# Patient Record
Sex: FEMALE | Race: WHITE | Employment: OTHER | URBAN - METROPOLITAN AREA
[De-identification: names, ages, dates, MRNs, and addresses within clinical notes are randomized per-mention and may not be internally consistent; named-entity substitution may affect disease eponyms.]

---

## 2018-05-04 ENCOUNTER — HOSPITAL ENCOUNTER (OUTPATIENT)
Age: 43
Discharge: HOME | End: 2018-05-04
Payer: COMMERCIAL

## 2018-05-04 LAB
ANION GAP SERPL CALCULATED.3IONS-SCNC: 10 MMOL/L (ref 7–16)
BASOPHILS ABSOLUTE: 0.02 E9/L (ref 0–0.2)
BASOPHILS RELATIVE PERCENT: 0.3 % (ref 0–2)
BUN BLDV-MCNC: 8 MG/DL (ref 6–20)
CALCIUM SERPL-MCNC: 9.4 MG/DL (ref 8.6–10.2)
CHLORIDE BLD-SCNC: 106 MMOL/L (ref 98–107)
CO2: 27 MMOL/L (ref 22–29)
CREAT SERPL-MCNC: 0.9 MG/DL (ref 0.5–1)
EOSINOPHILS ABSOLUTE: 0.27 E9/L (ref 0.05–0.5)
EOSINOPHILS RELATIVE PERCENT: 3.4 % (ref 0–6)
GFR AFRICAN AMERICAN: >60
GFR NON-AFRICAN AMERICAN: >60 ML/MIN/1.73
HCT VFR BLD CALC: 35.6 % (ref 34–48)
HEMOGLOBIN: 12.6 G/DL (ref 11.5–15.5)
IMMATURE GRANULOCYTES #: 0.02 E9/L
IMMATURE GRANULOCYTES %: 0.3 % (ref 0–5)
LYMPHOCYTES ABSOLUTE: 2.28 E9/L (ref 1.5–4)
LYMPHOCYTES RELATIVE PERCENT: 28.5 % (ref 20–42)
MAGNESIUM: 2 MG/DL (ref 1.6–2.6)
MCH RBC QN AUTO: 32.8 PG (ref 26–35)
MCHC RBC AUTO-ENTMCNC: 35.4 % (ref 32–34.5)
MCV RBC AUTO: 92.7 FL (ref 80–99.9)
MONOCYTES ABSOLUTE: 0.74 E9/L (ref 0.1–0.95)
MONOCYTES RELATIVE PERCENT: 9.3 % (ref 2–12)
NEUTROPHILS ABSOLUTE: 4.67 E9/L (ref 1.8–7.3)
NEUTROPHILS RELATIVE PERCENT: 58.2 % (ref 43–80)
PDW BLD-RTO: 11.8 FL (ref 11.5–15)
PLATELET # BLD: 260 E9/L (ref 130–450)
PMV BLD AUTO: 9.7 FL (ref 7–12)
POTASSIUM SERPL-SCNC: 4.3 MMOL/L (ref 3.5–5)
RBC # BLD: 3.84 E12/L (ref 3.5–5.5)
SODIUM BLD-SCNC: 143 MMOL/L (ref 132–146)
WBC # BLD: 8 E9/L (ref 4.5–11.5)

## 2018-05-04 PROCEDURE — 83735 ASSAY OF MAGNESIUM: CPT

## 2018-05-04 PROCEDURE — 84520 ASSAY OF UREA NITROGEN: CPT

## 2018-05-04 PROCEDURE — 82310 ASSAY OF CALCIUM: CPT

## 2018-05-04 PROCEDURE — 80051 ELECTROLYTE PANEL: CPT

## 2018-05-04 PROCEDURE — 85025 COMPLETE CBC W/AUTO DIFF WBC: CPT

## 2018-05-04 PROCEDURE — 36415 COLL VENOUS BLD VENIPUNCTURE: CPT

## 2018-05-04 PROCEDURE — 82565 ASSAY OF CREATININE: CPT

## 2018-06-22 ENCOUNTER — HOSPITAL ENCOUNTER (OUTPATIENT)
Age: 43
Discharge: HOME | End: 2018-06-24
Payer: COMMERCIAL

## 2018-06-22 ENCOUNTER — HOSPITAL ENCOUNTER (OUTPATIENT)
Dept: GENERAL RADIOLOGY | Age: 43
Discharge: HOME | End: 2018-06-24
Payer: COMMERCIAL

## 2018-06-22 DIAGNOSIS — R52 PAIN: ICD-10-CM

## 2018-06-22 PROCEDURE — 73110 X-RAY EXAM OF WRIST: CPT

## 2018-10-10 ENCOUNTER — HOSPITAL ENCOUNTER (OUTPATIENT)
Dept: GENERAL RADIOLOGY | Age: 43
Discharge: HOME | End: 2018-10-12
Payer: COMMERCIAL

## 2018-10-10 ENCOUNTER — HOSPITAL ENCOUNTER (OUTPATIENT)
Age: 43
Discharge: HOME | End: 2018-10-10
Payer: COMMERCIAL

## 2018-10-10 DIAGNOSIS — M15.9 PRIMARY OSTEOARTHRITIS INVOLVING MULTIPLE JOINTS: ICD-10-CM

## 2018-10-10 PROCEDURE — 73110 X-RAY EXAM OF WRIST: CPT

## 2018-10-10 PROCEDURE — 73030 X-RAY EXAM OF SHOULDER: CPT

## 2018-11-09 ENCOUNTER — HOSPITAL ENCOUNTER (OUTPATIENT)
Age: 43
Discharge: HOME | End: 2018-11-11
Payer: COMMERCIAL

## 2018-11-09 PROCEDURE — 88175 CYTOPATH C/V AUTO FLUID REDO: CPT

## 2019-01-11 ENCOUNTER — HOSPITAL ENCOUNTER (OUTPATIENT)
Dept: MRI IMAGING | Age: 44
Discharge: HOME | End: 2019-01-13
Payer: COMMERCIAL

## 2019-01-11 DIAGNOSIS — M75.101 TEAR OF RIGHT ROTATOR CUFF, UNSPECIFIED TEAR EXTENT: ICD-10-CM

## 2019-01-11 PROCEDURE — 73221 MRI JOINT UPR EXTREM W/O DYE: CPT

## 2019-03-08 ENCOUNTER — HOSPITAL ENCOUNTER (OUTPATIENT)
Age: 44
Discharge: HOME | End: 2019-03-10
Payer: COMMERCIAL

## 2019-03-08 ENCOUNTER — HOSPITAL ENCOUNTER (OUTPATIENT)
Dept: GENERAL RADIOLOGY | Age: 44
Discharge: HOME | End: 2019-03-10
Payer: COMMERCIAL
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
## 2019-03-08 PROCEDURE — 73080 X-RAY EXAM OF ELBOW: CPT

## 2019-10-14 ENCOUNTER — TELEPHONE (OUTPATIENT)
Dept: OTHER | Facility: CLINIC | Age: 44
End: 2019-10-14

## 2019-10-14 ENCOUNTER — HOSPITAL ENCOUNTER (EMERGENCY)
Age: 44
Discharge: HOME | End: 2019-10-14
Payer: COMMERCIAL

## 2019-10-14 ENCOUNTER — APPOINTMENT (OUTPATIENT)
Dept: GENERAL RADIOLOGY | Age: 44
End: 2019-10-14
Payer: COMMERCIAL

## 2019-10-14 VITALS
DIASTOLIC BLOOD PRESSURE: 84 MMHG | HEART RATE: 80 BPM | OXYGEN SATURATION: 100 % | RESPIRATION RATE: 16 BRPM | TEMPERATURE: 98.2 F | SYSTOLIC BLOOD PRESSURE: 122 MMHG
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
## 2019-10-14 DIAGNOSIS — S61.259A DOG BITE OF FINGER, INITIAL ENCOUNTER: Primary | ICD-10-CM

## 2019-10-14 DIAGNOSIS — Z23 NEED FOR TDAP VACCINATION: ICD-10-CM

## 2019-10-14 PROCEDURE — 6360000002 HC RX W HCPCS: Performed by: NURSE PRACTITIONER

## 2019-10-14 PROCEDURE — 90715 TDAP VACCINE 7 YRS/> IM: CPT | Performed by: NURSE PRACTITIONER

## 2019-10-14 PROCEDURE — 73130 X-RAY EXAM OF HAND: CPT

## 2019-10-14 PROCEDURE — 6370000000 HC RX 637 (ALT 250 FOR IP): Performed by: NURSE PRACTITIONER

## 2019-10-14 PROCEDURE — 99283 EMERGENCY DEPT VISIT LOW MDM: CPT

## 2019-10-14 PROCEDURE — 90471 IMMUNIZATION ADMIN: CPT | Performed by: NURSE PRACTITIONER

## 2019-10-14 RX ORDER — ONDANSETRON 4 MG/1
4 TABLET, ORALLY DISINTEGRATING ORAL ONCE
Status: COMPLETED | OUTPATIENT
Start: 2019-10-14 | End: 2019-10-14

## 2019-10-14 RX ORDER — ONDANSETRON 4 MG/1
4 TABLET, ORALLY DISINTEGRATING ORAL 3 TIMES DAILY PRN
Qty: 21 TABLET | Refills: 0 | Status: SHIPPED | OUTPATIENT
Start: 2019-10-14 | End: 2020-12-09

## 2019-10-14 RX ORDER — AMOXICILLIN AND CLAVULANATE POTASSIUM 875; 125 MG/1; MG/1
1 TABLET, FILM COATED ORAL 2 TIMES DAILY WITH MEALS
Qty: 20 TABLET | Refills: 0 | Status: SHIPPED | OUTPATIENT
Start: 2019-10-14 | End: 2019-10-24

## 2019-10-14 RX ORDER — FLUCONAZOLE 150 MG/1
150 TABLET ORAL ONCE
Qty: 1 TABLET | Refills: 0 | Status: SHIPPED | OUTPATIENT
Start: 2019-10-14 | End: 2019-10-14

## 2019-10-14 RX ORDER — AMOXICILLIN AND CLAVULANATE POTASSIUM 875; 125 MG/1; MG/1
1 TABLET, FILM COATED ORAL ONCE
Status: COMPLETED | OUTPATIENT
Start: 2019-10-14 | End: 2019-10-14

## 2019-10-14 RX ADMIN — ONDANSETRON 4 MG: 4 TABLET, ORALLY DISINTEGRATING ORAL at 17:28

## 2019-10-14 RX ADMIN — TETANUS TOXOID, REDUCED DIPHTHERIA TOXOID AND ACELLULAR PERTUSSIS VACCINE, ADSORBED 0.5 ML: 5; 2.5; 8; 8; 2.5 SUSPENSION INTRAMUSCULAR at 17:28

## 2019-10-14 RX ADMIN — AMOXICILLIN AND CLAVULANATE POTASSIUM 1 TABLET: 875; 125 TABLET, FILM COATED ORAL at 17:28

## 2019-10-14 ASSESSMENT — PAIN DESCRIPTION - PAIN TYPE: TYPE: ACUTE PAIN

## 2019-10-14 ASSESSMENT — PAIN DESCRIPTION - DESCRIPTORS: DESCRIPTORS: THROBBING

## 2019-10-14 ASSESSMENT — PAIN DESCRIPTION - ORIENTATION: ORIENTATION: LEFT

## 2019-10-14 ASSESSMENT — PAIN SCALES - GENERAL: PAINLEVEL_OUTOF10: 3

## 2019-10-29 ENCOUNTER — HOSPITAL ENCOUNTER (OUTPATIENT)
Dept: WOUND CARE | Age: 44
Discharge: HOME | End: 2019-10-29
Payer: COMMERCIAL

## 2019-11-18 ENCOUNTER — HOSPITAL ENCOUNTER (OUTPATIENT)
Age: 44
Discharge: HOME | End: 2019-11-20
Payer: COMMERCIAL

## 2019-11-18 DIAGNOSIS — N92.6 IRREGULAR PERIODS/MENSTRUAL CYCLES: ICD-10-CM

## 2019-11-18 LAB
ESTRADIOL LEVEL: 231.1 PG/ML
FOLLICLE STIMULATING HORMONE: 3.7 MIU/ML
GONADOTROPIN, CHORIONIC (HCG) QUANT: <0.1 MIU/ML
HCT VFR BLD CALC: 40.9 % (ref 34–48)
HEMOGLOBIN: 13.6 G/DL (ref 11.5–15.5)
LUTEINIZING HORMONE: 6.4 MIU/ML
MCH RBC QN AUTO: 32 PG (ref 26–35)
MCHC RBC AUTO-ENTMCNC: 33.3 % (ref 32–34.5)
MCV RBC AUTO: 96.2 FL (ref 80–99.9)
PDW BLD-RTO: 12.3 FL (ref 11.5–15)
PLATELET # BLD: 319 E9/L (ref 130–450)
PMV BLD AUTO: 10.3 FL (ref 7–12)
PROLACTIN: 7.41 NG/ML
RBC # BLD: 4.25 E12/L (ref 3.5–5.5)
TSH SERPL DL<=0.05 MIU/L-ACNC: 0.86 UIU/ML (ref 0.27–4.2)
WBC # BLD: 6.6 E9/L (ref 4.5–11.5)

## 2019-11-18 PROCEDURE — 84443 ASSAY THYROID STIM HORMONE: CPT

## 2019-11-18 PROCEDURE — 84702 CHORIONIC GONADOTROPIN TEST: CPT

## 2019-11-18 PROCEDURE — 83002 ASSAY OF GONADOTROPIN (LH): CPT

## 2019-11-18 PROCEDURE — 84146 ASSAY OF PROLACTIN: CPT

## 2019-11-18 PROCEDURE — 82626 DEHYDROEPIANDROSTERONE: CPT

## 2019-11-18 PROCEDURE — 84144 ASSAY OF PROGESTERONE: CPT

## 2019-11-18 PROCEDURE — 85027 COMPLETE CBC AUTOMATED: CPT

## 2019-11-18 PROCEDURE — 82670 ASSAY OF TOTAL ESTRADIOL: CPT

## 2019-11-18 PROCEDURE — 87624 HPV HI-RISK TYP POOLED RSLT: CPT

## 2019-11-18 PROCEDURE — 88175 CYTOPATH C/V AUTO FLUID REDO: CPT

## 2019-11-18 PROCEDURE — 83001 ASSAY OF GONADOTROPIN (FSH): CPT

## 2019-11-20 LAB — PROGESTERONE LEVEL: <0.05 NG/ML

## 2019-11-22 LAB
DHEA: 2.34 NG/ML (ref 0.63–4.7)
HPV SAMPLE: NORMAL
HPV TYPE 16: NOT DETECTED
HPV TYPE 18: NOT DETECTED
HPV, HIGH RISK OTHER: NOT DETECTED
INTERPRETATION: NORMAL
SOURCE: NORMAL

## 2020-06-03 ENCOUNTER — HOSPITAL ENCOUNTER (OUTPATIENT)
Age: 45
Discharge: HOME | End: 2020-06-05
Payer: COMMERCIAL

## 2020-06-03 ENCOUNTER — HOSPITAL ENCOUNTER (OUTPATIENT)
Dept: GENERAL RADIOLOGY | Age: 45
Discharge: HOME | End: 2020-06-05
Payer: COMMERCIAL

## 2020-06-03 PROCEDURE — 73030 X-RAY EXAM OF SHOULDER: CPT

## 2020-07-14 ENCOUNTER — HOSPITAL ENCOUNTER (OUTPATIENT)
Age: 45
Discharge: HOME | End: 2020-07-14
Payer: COMMERCIAL

## 2020-07-14 LAB
ANION GAP SERPL CALCULATED.3IONS-SCNC: 11 MMOL/L (ref 7–16)
BASOPHILS ABSOLUTE: 0.03 E9/L (ref 0–0.2)
BASOPHILS RELATIVE PERCENT: 0.4 % (ref 0–2)
BUN BLDV-MCNC: 10 MG/DL (ref 6–20)
CHLORIDE BLD-SCNC: 102 MMOL/L (ref 98–107)
CO2: 26 MMOL/L (ref 22–29)
CREAT SERPL-MCNC: 0.9 MG/DL (ref 0.5–1)
EOSINOPHILS ABSOLUTE: 0.16 E9/L (ref 0.05–0.5)
EOSINOPHILS RELATIVE PERCENT: 2.4 % (ref 0–6)
GFR AFRICAN AMERICAN: >60
GFR NON-AFRICAN AMERICAN: >60 ML/MIN/1.73
HCT VFR BLD CALC: 36.8 % (ref 34–48)
HEMOGLOBIN: 13.5 G/DL (ref 11.5–15.5)
IMMATURE GRANULOCYTES #: 0.02 E9/L
IMMATURE GRANULOCYTES %: 0.3 % (ref 0–5)
LYMPHOCYTES ABSOLUTE: 1.74 E9/L (ref 1.5–4)
LYMPHOCYTES RELATIVE PERCENT: 26.1 % (ref 20–42)
MCH RBC QN AUTO: 34.3 PG (ref 26–35)
MCHC RBC AUTO-ENTMCNC: 36.7 % (ref 32–34.5)
MCV RBC AUTO: 93.4 FL (ref 80–99.9)
MONOCYTES ABSOLUTE: 0.55 E9/L (ref 0.1–0.95)
MONOCYTES RELATIVE PERCENT: 8.2 % (ref 2–12)
NEUTROPHILS ABSOLUTE: 4.17 E9/L (ref 1.8–7.3)
NEUTROPHILS RELATIVE PERCENT: 62.6 % (ref 43–80)
PDW BLD-RTO: 11.4 FL (ref 11.5–15)
PLATELET # BLD: 280 E9/L (ref 130–450)
PMV BLD AUTO: 9.6 FL (ref 7–12)
POTASSIUM SERPL-SCNC: 4.5 MMOL/L (ref 3.5–5)
RBC # BLD: 3.94 E12/L (ref 3.5–5.5)
SODIUM BLD-SCNC: 139 MMOL/L (ref 132–146)
T3 FREE: 2.4 PG/ML (ref 2–4.4)
T4 FREE: 1.13 NG/DL (ref 0.93–1.7)
TSH SERPL DL<=0.05 MIU/L-ACNC: 1.57 UIU/ML (ref 0.27–4.2)
WBC # BLD: 6.7 E9/L (ref 4.5–11.5)

## 2020-07-14 PROCEDURE — 84443 ASSAY THYROID STIM HORMONE: CPT

## 2020-07-14 PROCEDURE — 85025 COMPLETE CBC W/AUTO DIFF WBC: CPT

## 2020-07-14 PROCEDURE — 36415 COLL VENOUS BLD VENIPUNCTURE: CPT

## 2020-07-14 PROCEDURE — 86376 MICROSOMAL ANTIBODY EACH: CPT

## 2020-07-14 PROCEDURE — 80051 ELECTROLYTE PANEL: CPT

## 2020-07-14 PROCEDURE — 84439 ASSAY OF FREE THYROXINE: CPT

## 2020-07-14 PROCEDURE — 84520 ASSAY OF UREA NITROGEN: CPT

## 2020-07-14 PROCEDURE — 84481 FREE ASSAY (FT-3): CPT

## 2020-07-14 PROCEDURE — 82565 ASSAY OF CREATININE: CPT

## 2020-07-17 LAB — THYROID PEROXIDASE (TPO) ABS: 0.5 IU/ML (ref 0–9)

## 2020-09-16 ENCOUNTER — HOSPITAL ENCOUNTER (OUTPATIENT)
Age: 45
Discharge: HOME | End: 2020-09-18
Payer: COMMERCIAL

## 2020-09-16 ENCOUNTER — HOSPITAL ENCOUNTER (OUTPATIENT)
Dept: GENERAL RADIOLOGY | Age: 45
Discharge: HOME | End: 2020-09-18
Payer: COMMERCIAL

## 2020-09-16 PROCEDURE — 73110 X-RAY EXAM OF WRIST: CPT

## 2020-12-09 ENCOUNTER — HOSPITAL ENCOUNTER (OUTPATIENT)
Age: 45
Discharge: HOME | End: 2020-12-11
Payer: COMMERCIAL

## 2020-12-09 PROCEDURE — U0003 INFECTIOUS AGENT DETECTION BY NUCLEIC ACID (DNA OR RNA); SEVERE ACUTE RESPIRATORY SYNDROME CORONAVIRUS 2 (SARS-COV-2) (CORONAVIRUS DISEASE [COVID-19]), AMPLIFIED PROBE TECHNIQUE, MAKING USE OF HIGH THROUGHPUT TECHNOLOGIES AS DESCRIBED BY CMS-2020-01-R: HCPCS

## 2020-12-09 NOTE — PROGRESS NOTES
[facility] PRE-ADMISSION TESTING INSTRUCTIONS    The Preadmission Testing patient is instructed accordingly using the following criteria (check applicable):    ARRIVAL INSTRUCTIONS:  [x] Parking the day of Surgery is located in the Main Entrance lot. Upon entering the door, make an immediate right to the surgery reception desk    [x] Bring photo ID and insurance card    [] Bring in a copy of Living will or Durable Power of  papers. [x] Please be sure to arrange transportation to and from the hospital    [x] Please arrange for someone to be with you the remainder of the day due to having anesthesia      GENERAL INSTRUCTIONS:    [x] Nothing by mouth after midnight, including gum, candy, mints or water    [x] You may brush your teeth, but do not swallow any water    [x] Take medications as instructed with 1-2 oz of water    [x] Stop herbal supplements and vitamins 5 days prior to procedure    [] Follow preop dosing of blood thinners per physician instructions    [] Do not take insulin or oral diabetic medications    [] If diabetic and have low blood sugar or feel symptomatic, take 1-2oz apple juice or glucose tablets    [] Bring inhalers day of surgery    [] Bring C-PAP/ Bi-Pap day of surgery    [] Bring urine specimen day of surgery    [x] Antibacterial Soap shower or bath AM of Surgery, no lotion, powders or creams to surgical site    [] Follow bowel prep as instructed per surgeon    [] No tobacco products within 24 hours of surgery     [] No alcohol or illegal drug use within 24 hours of surgery.     [x] Jewelry, body piercing's, eyeglasses, contact lenses and dentures are not permitted into surgery (bring cases)      [x] Please do not wear any nail polish or make up on the day of surgery    [x] If not already done, you can expect a call from registration    [x] If surgeon requests a time change you will be notified the day prior to surgery    [x] If you receive a survey after surgery we would greatly appreciate your comments    [] Parent/guardian of a minor must accompany their child and remain on the premises  the entire time they are under our care     [] Pediatric patients may bring favorite toy, blanket or comfort item with them    [] A caregiver or family member must remain with the patient during their stay if they are mentally handicapped, have dementia, disoriented or unable to use a call light or would be a safety concern if left unattended    [x] Please notify surgeon if you develop any illness between now and time of surgery (cold, cough, sore throat, fever, nausea, vomiting) or any signs of infections  including skin, wounds, and dental.    [] Other instructions    EDUCATIONAL MATERIALS PROVIDED:    [] PAT Preoperative Education Packet/Booklet     [] Medication List    [] Fluoroscopy Information Pamphlet    [] Transfusion bracelet applied with instructions    [] Joint replacement video reviewed    [] Shower with antibacterial soap and use CHG wipes provided the evening before surgery as instructed        Have you been tested for COVID  Yes           Have you been told you were positive for COVID No  Have you had any known exposure to someone that is positive for COVID No  Do you have a cough                   No              Do you have shortness of breath No                 Do you have a sore throat            No                Are you having chills                    No                Are you having muscle aches. No                    Please come to the hospital wearing a mask and have your significant other wear a mask as well. Both of you should check your temperature before leaving to come here,  if it is 100 or higher please call [phone] for instruction.

## 2020-12-10 LAB
SARS-COV-2: NOT DETECTED
SOURCE: 2

## 2020-12-11 ENCOUNTER — ANESTHESIA EVENT (OUTPATIENT)
Dept: OPERATING ROOM | Age: 45
End: 2020-12-11
Payer: COMMERCIAL

## 2020-12-14 ENCOUNTER — HOSPITAL ENCOUNTER (OUTPATIENT)
Age: 45
Setting detail: OUTPATIENT SURGERY
Discharge: HOME | End: 2020-12-14
Attending: ORTHOPAEDIC SURGERY | Admitting: ORTHOPAEDIC SURGERY
Payer: COMMERCIAL

## 2020-12-14 ENCOUNTER — ANESTHESIA (OUTPATIENT)
Dept: OPERATING ROOM | Age: 45
End: 2020-12-14
Payer: COMMERCIAL

## 2020-12-14 VITALS
TEMPERATURE: 97.4 F | HEART RATE: 86 BPM | HEIGHT: 65 IN | SYSTOLIC BLOOD PRESSURE: 114 MMHG | DIASTOLIC BLOOD PRESSURE: 60 MMHG | WEIGHT: 185 LBS | BODY MASS INDEX: 30.82 KG/M2 | RESPIRATION RATE: 19 BRPM | OXYGEN SATURATION: 96 %

## 2020-12-14 VITALS — DIASTOLIC BLOOD PRESSURE: 54 MMHG | SYSTOLIC BLOOD PRESSURE: 101 MMHG | OXYGEN SATURATION: 100 %

## 2020-12-14 PROBLEM — G89.18 POSTOPERATIVE PAIN: Status: ACTIVE | Noted: 2020-12-14

## 2020-12-14 LAB — HCG(URINE) PREGNANCY TEST: NEGATIVE

## 2020-12-14 PROCEDURE — 7100000010 HC PHASE II RECOVERY - FIRST 15 MIN: Performed by: ORTHOPAEDIC SURGERY

## 2020-12-14 PROCEDURE — 6360000002 HC RX W HCPCS

## 2020-12-14 PROCEDURE — 88304 TISSUE EXAM BY PATHOLOGIST: CPT

## 2020-12-14 PROCEDURE — 6360000002 HC RX W HCPCS: Performed by: ANESTHESIOLOGY

## 2020-12-14 PROCEDURE — 3600000012 HC SURGERY LEVEL 2 ADDTL 15MIN: Performed by: ORTHOPAEDIC SURGERY

## 2020-12-14 PROCEDURE — 3700000000 HC ANESTHESIA ATTENDED CARE: Performed by: ORTHOPAEDIC SURGERY

## 2020-12-14 PROCEDURE — 3600000002 HC SURGERY LEVEL 2 BASE: Performed by: ORTHOPAEDIC SURGERY

## 2020-12-14 PROCEDURE — 2500000003 HC RX 250 WO HCPCS

## 2020-12-14 PROCEDURE — 81025 URINE PREGNANCY TEST: CPT

## 2020-12-14 PROCEDURE — 2580000003 HC RX 258

## 2020-12-14 PROCEDURE — 3700000001 HC ADD 15 MINUTES (ANESTHESIA): Performed by: ORTHOPAEDIC SURGERY

## 2020-12-14 PROCEDURE — 6360000002 HC RX W HCPCS: Performed by: ORTHOPAEDIC SURGERY

## 2020-12-14 PROCEDURE — 2709999900 HC NON-CHARGEABLE SUPPLY: Performed by: ORTHOPAEDIC SURGERY

## 2020-12-14 PROCEDURE — 7100000000 HC PACU RECOVERY - FIRST 15 MIN: Performed by: ORTHOPAEDIC SURGERY

## 2020-12-14 PROCEDURE — 2580000003 HC RX 258: Performed by: ORTHOPAEDIC SURGERY

## 2020-12-14 PROCEDURE — 7100000001 HC PACU RECOVERY - ADDTL 15 MIN: Performed by: ORTHOPAEDIC SURGERY

## 2020-12-14 PROCEDURE — 7100000011 HC PHASE II RECOVERY - ADDTL 15 MIN: Performed by: ORTHOPAEDIC SURGERY

## 2020-12-14 RX ORDER — DIPHENHYDRAMINE HYDROCHLORIDE 50 MG/ML
12.5 INJECTION INTRAMUSCULAR; INTRAVENOUS
Status: DISCONTINUED | OUTPATIENT
Start: 2020-12-14 | End: 2020-12-14 | Stop reason: HOSPADM

## 2020-12-14 RX ORDER — SODIUM CHLORIDE, SODIUM LACTATE, POTASSIUM CHLORIDE, CALCIUM CHLORIDE 600; 310; 30; 20 MG/100ML; MG/100ML; MG/100ML; MG/100ML
INJECTION, SOLUTION INTRAVENOUS CONTINUOUS PRN
Status: DISCONTINUED | OUTPATIENT
Start: 2020-12-14 | End: 2020-12-14 | Stop reason: SDUPTHER

## 2020-12-14 RX ORDER — ONDANSETRON 2 MG/ML
INJECTION INTRAMUSCULAR; INTRAVENOUS PRN
Status: DISCONTINUED | OUTPATIENT
Start: 2020-12-14 | End: 2020-12-14 | Stop reason: SDUPTHER

## 2020-12-14 RX ORDER — FENTANYL CITRATE 50 UG/ML
25 INJECTION, SOLUTION INTRAMUSCULAR; INTRAVENOUS EVERY 5 MIN PRN
Status: DISCONTINUED | OUTPATIENT
Start: 2020-12-14 | End: 2020-12-14 | Stop reason: HOSPADM

## 2020-12-14 RX ORDER — GLYCOPYRROLATE 1 MG/5 ML
SYRINGE (ML) INTRAVENOUS PRN
Status: DISCONTINUED | OUTPATIENT
Start: 2020-12-14 | End: 2020-12-14 | Stop reason: SDUPTHER

## 2020-12-14 RX ORDER — KETOROLAC TROMETHAMINE 30 MG/ML
INJECTION, SOLUTION INTRAMUSCULAR; INTRAVENOUS PRN
Status: DISCONTINUED | OUTPATIENT
Start: 2020-12-14 | End: 2020-12-14 | Stop reason: SDUPTHER

## 2020-12-14 RX ORDER — LIDOCAINE HYDROCHLORIDE 20 MG/ML
INJECTION, SOLUTION EPIDURAL; INFILTRATION; INTRACAUDAL; PERINEURAL PRN
Status: DISCONTINUED | OUTPATIENT
Start: 2020-12-14 | End: 2020-12-14 | Stop reason: SDUPTHER

## 2020-12-14 RX ORDER — CEFAZOLIN SODIUM 1 G/3ML
INJECTION, POWDER, FOR SOLUTION INTRAMUSCULAR; INTRAVENOUS
Status: DISCONTINUED
Start: 2020-12-14 | End: 2020-12-14 | Stop reason: HOSPADM

## 2020-12-14 RX ORDER — PROPOFOL 10 MG/ML
INJECTION, EMULSION INTRAVENOUS CONTINUOUS PRN
Status: DISCONTINUED | OUTPATIENT
Start: 2020-12-14 | End: 2020-12-14 | Stop reason: SDUPTHER

## 2020-12-14 RX ORDER — HYDROCODONE BITARTRATE AND ACETAMINOPHEN 5; 325 MG/1; MG/1
1 TABLET ORAL EVERY 6 HOURS PRN
Qty: 20 TABLET | Refills: 0 | Status: SHIPPED | OUTPATIENT
Start: 2020-12-14 | End: 2020-12-19

## 2020-12-14 RX ORDER — FENTANYL CITRATE 50 UG/ML
INJECTION, SOLUTION INTRAMUSCULAR; INTRAVENOUS PRN
Status: DISCONTINUED | OUTPATIENT
Start: 2020-12-14 | End: 2020-12-14 | Stop reason: SDUPTHER

## 2020-12-14 RX ORDER — KETAMINE HYDROCHLORIDE 10 MG/ML
INJECTION, SOLUTION INTRAMUSCULAR; INTRAVENOUS PRN
Status: DISCONTINUED | OUTPATIENT
Start: 2020-12-14 | End: 2020-12-14 | Stop reason: SDUPTHER

## 2020-12-14 RX ORDER — MEPERIDINE HYDROCHLORIDE 25 MG/ML
12.5 INJECTION INTRAMUSCULAR; INTRAVENOUS; SUBCUTANEOUS EVERY 10 MIN PRN
Status: DISCONTINUED | OUTPATIENT
Start: 2020-12-14 | End: 2020-12-14 | Stop reason: HOSPADM

## 2020-12-14 RX ORDER — DIPHENHYDRAMINE HYDROCHLORIDE 50 MG/ML
INJECTION INTRAMUSCULAR; INTRAVENOUS PRN
Status: DISCONTINUED | OUTPATIENT
Start: 2020-12-14 | End: 2020-12-14 | Stop reason: SDUPTHER

## 2020-12-14 RX ORDER — MIDAZOLAM HYDROCHLORIDE 1 MG/ML
INJECTION INTRAMUSCULAR; INTRAVENOUS PRN
Status: DISCONTINUED | OUTPATIENT
Start: 2020-12-14 | End: 2020-12-14 | Stop reason: SDUPTHER

## 2020-12-14 RX ORDER — OXYCODONE HYDROCHLORIDE AND ACETAMINOPHEN 5; 325 MG/1; MG/1
1 TABLET ORAL PRN
Status: DISCONTINUED | OUTPATIENT
Start: 2020-12-14 | End: 2020-12-14 | Stop reason: HOSPADM

## 2020-12-14 RX ORDER — PROPOFOL 10 MG/ML
INJECTION, EMULSION INTRAVENOUS PRN
Status: DISCONTINUED | OUTPATIENT
Start: 2020-12-14 | End: 2020-12-14 | Stop reason: SDUPTHER

## 2020-12-14 RX ADMIN — DIPHENHYDRAMINE HYDROCHLORIDE 12.5 MG: 50 INJECTION, SOLUTION INTRAMUSCULAR; INTRAVENOUS at 13:59

## 2020-12-14 RX ADMIN — KETOROLAC TROMETHAMINE 30 MG: 30 INJECTION, SOLUTION INTRAMUSCULAR; INTRAVENOUS at 14:30

## 2020-12-14 RX ADMIN — KETAMINE HYDROCHLORIDE 30 MG: 10 INJECTION, SOLUTION INTRAMUSCULAR; INTRAVENOUS at 14:03

## 2020-12-14 RX ADMIN — PROPOFOL 200 MG: 10 INJECTION, EMULSION INTRAVENOUS at 13:46

## 2020-12-14 RX ADMIN — SODIUM CHLORIDE, POTASSIUM CHLORIDE, SODIUM LACTATE AND CALCIUM CHLORIDE: 600; 310; 30; 20 INJECTION, SOLUTION INTRAVENOUS at 14:25

## 2020-12-14 RX ADMIN — ONDANSETRON 4 MG: 2 INJECTION INTRAMUSCULAR; INTRAVENOUS at 14:30

## 2020-12-14 RX ADMIN — PROPOFOL 100 MG: 10 INJECTION, EMULSION INTRAVENOUS at 13:48

## 2020-12-14 RX ADMIN — Medication 0.2 MG: at 13:44

## 2020-12-14 RX ADMIN — LIDOCAINE HYDROCHLORIDE 40 MG: 20 INJECTION, SOLUTION EPIDURAL; INFILTRATION; INTRACAUDAL; PERINEURAL at 13:46

## 2020-12-14 RX ADMIN — MEPERIDINE HYDROCHLORIDE 12.5 MG: 25 INJECTION, SOLUTION INTRAMUSCULAR; INTRAVENOUS; SUBCUTANEOUS at 16:05

## 2020-12-14 RX ADMIN — PROPOFOL 140 MCG/KG/MIN: 10 INJECTION, EMULSION INTRAVENOUS at 13:55

## 2020-12-14 RX ADMIN — CEFAZOLIN 1 G: 1 INJECTION, POWDER, FOR SOLUTION INTRAMUSCULAR; INTRAVENOUS at 13:44

## 2020-12-14 RX ADMIN — FENTANYL CITRATE 100 MCG: 50 INJECTION, SOLUTION INTRAMUSCULAR; INTRAVENOUS at 13:46

## 2020-12-14 RX ADMIN — PROPOFOL 100 MG: 10 INJECTION, EMULSION INTRAVENOUS at 13:51

## 2020-12-14 RX ADMIN — SODIUM CHLORIDE, POTASSIUM CHLORIDE, SODIUM LACTATE AND CALCIUM CHLORIDE: 600; 310; 30; 20 INJECTION, SOLUTION INTRAVENOUS at 12:05

## 2020-12-14 RX ADMIN — MIDAZOLAM 2 MG: 1 INJECTION INTRAMUSCULAR; INTRAVENOUS at 13:40

## 2020-12-14 ASSESSMENT — PULMONARY FUNCTION TESTS
PIF_VALUE: 13
PIF_VALUE: 3
PIF_VALUE: 2
PIF_VALUE: 13
PIF_VALUE: 13
PIF_VALUE: 16
PIF_VALUE: 13
PIF_VALUE: 2
PIF_VALUE: 3
PIF_VALUE: 3
PIF_VALUE: 2
PIF_VALUE: 3
PIF_VALUE: 0
PIF_VALUE: 13
PIF_VALUE: 22
PIF_VALUE: 13
PIF_VALUE: 3
PIF_VALUE: 11
PIF_VALUE: 10
PIF_VALUE: 17
PIF_VALUE: 3
PIF_VALUE: 13
PIF_VALUE: 3
PIF_VALUE: 13
PIF_VALUE: 6
PIF_VALUE: 3
PIF_VALUE: 1
PIF_VALUE: 11
PIF_VALUE: 0
PIF_VALUE: 0
PIF_VALUE: 13
PIF_VALUE: 13
PIF_VALUE: 10
PIF_VALUE: 0
PIF_VALUE: 13
PIF_VALUE: 3
PIF_VALUE: 14
PIF_VALUE: 13
PIF_VALUE: 13
PIF_VALUE: 5
PIF_VALUE: 21
PIF_VALUE: 15
PIF_VALUE: 13
PIF_VALUE: 3
PIF_VALUE: 13
PIF_VALUE: 3
PIF_VALUE: 13
PIF_VALUE: 0
PIF_VALUE: 2
PIF_VALUE: 13
PIF_VALUE: 11
PIF_VALUE: 14
PIF_VALUE: 0
PIF_VALUE: 13
PIF_VALUE: 13
PIF_VALUE: 23
PIF_VALUE: 13
PIF_VALUE: 13
PIF_VALUE: 10
PIF_VALUE: 13

## 2020-12-14 ASSESSMENT — LIFESTYLE VARIABLES: SMOKING_STATUS: 1

## 2020-12-14 ASSESSMENT — PAIN SCALES - GENERAL
PAINLEVEL_OUTOF10: 4
PAINLEVEL_OUTOF10: 0
PAINLEVEL_OUTOF10: 5
PAINLEVEL_OUTOF10: 8
PAINLEVEL_OUTOF10: 0
PAINLEVEL_OUTOF10: 4
PAINLEVEL_OUTOF10: 0

## 2020-12-14 ASSESSMENT — PAIN DESCRIPTION - PAIN TYPE: TYPE: SURGICAL PAIN

## 2020-12-14 ASSESSMENT — PAIN - FUNCTIONAL ASSESSMENT: PAIN_FUNCTIONAL_ASSESSMENT: 0-10

## 2020-12-14 NOTE — BRIEF OP NOTE
Brief Postoperative Note      Patient: Fely Schreiber  YOB: 1975  MRN: [id number]    Date of Procedure: 12/14/2020    Pre-Op Diagnosis: Right dorsal wrist ganglion    Post-Op Diagnosis: Same       Procedure(s):  RIGHT DORSAL WRIST GANGLION EXCISION    Surgeon(s):  Hans Fink MD    Assistant:  * No surgical staff found *    Anesthesia: General    Estimated Blood Loss (mL): Minimal    Complications: None    Specimens:   ID Type Source Tests Collected by Time Destination   A : RIGHT GANGLION CYST Tissue Tissue SURGICAL PATHOLOGY Hans Fink MD 12/14/2020 1411        Implants:  * No implants in log *      Drains: * No LDAs found *    Findings: sessile right dorsal wrist ganglion    Electronically signed by Hans Fink MD on 12/14/2020 at 2:53 PM

## 2020-12-14 NOTE — PROGRESS NOTES
Patient has requested we do not give her pain medication for fear of PONV. I have discussed the benefit of allowing me to give demerol for shivering and she has agreed to that (see MAR). Patient tolerated well and VSS. She states \"I feel much better\". Nutrition offered, IV removed and discharge instructions provided to her and her mother. They have no further questions or concerns at this time.

## 2020-12-14 NOTE — ANESTHESIA PRE PROCEDURE
Department of Anesthesiology  Preprocedure Note       Name:  Lennox Lyons   Age:  39 y.o.  :  1975                                          MRN:  [id number]         Date:  2020      Surgeon: Izabela Guerrero):  Lolita Gomez MD    Procedure: Procedure(s):  RIGHT DORSAL WRIST GANGLION EXCISION    Medications prior to admission:   Prior to Admission medications    Medication Sig Start Date End Date Taking? Authorizing Provider   buPROPion [facility] SR) 150 MG extended release tablet Take 150 mg by mouth daily  10/12/17  Yes Historical Provider, MD   escitalopram (LEXAPRO) 20 MG tablet Take 20 mg by mouth daily. Yes Historical Provider, MD   vitamin D 25 MCG (1000 UT) CAPS Take 1,000 Units by mouth daily    Historical Provider, MD   ascorbic acid (VITAMIN C) 1000 MG tablet Take 1,000 mg by mouth daily    Historical Provider, MD   Multiple Vitamin (MULTI-VITAMIN DAILY PO) Take by mouth daily    Historical Provider, MD       Current medications:    No current facility-administered medications for this encounter. Allergies: Allergies   Allergen Reactions    Compazine [Prochlorperazine Maleate] Other (See Comments)     Patient states \"I go catatonic\"    Codeine Nausea And Vomiting       Problem List:  There is no problem list on file for this patient. Past Medical History:        Diagnosis Date    Depression     Endometriosis     PONV (postoperative nausea and vomiting)        Past Surgical History:        Procedure Laterality Date    APPENDECTOMY      CHOLECYSTECTOMY      LAPAROSCOPY  ,    pain in abdomen    TONSILLECTOMY         Social History:    Social History     Tobacco Use    Smoking status: Never Smoker    Smokeless tobacco: Never Used   Substance Use Topics    Alcohol use:  No                                Counseling given: Not Answered      Vital Signs (Current):   Vitals:    20 1522   Weight: 185 lb (83.9 kg)   Height: 5' 5\" (1.651 m) BP Readings from Last 3 Encounters:   11/19/20 100/66   12/16/19 107/71   11/18/19 108/71       NPO Status:                                                                                 BMI:   Wt Readings from Last 3 Encounters:   12/09/20 185 lb (83.9 kg)   11/19/20 188 lb (85.3 kg)   12/16/19 188 lb (85.3 kg)     Body mass index is 30.79 kg/m². CBC:   Lab Results   Component Value Date    WBC 6.7 07/14/2020    RBC 3.94 07/14/2020    HGB 13.5 07/14/2020    HCT 36.8 07/14/2020    MCV 93.4 07/14/2020    RDW 11.4 07/14/2020     07/14/2020       CMP:   Lab Results   Component Value Date     07/14/2020    K 4.5 07/14/2020     07/14/2020    CO2 26 07/14/2020    BUN 10 07/14/2020    CREATININE 0.9 07/14/2020    GFRAA >60 07/14/2020    LABGLOM >60 07/14/2020    CALCIUM 9.4 05/04/2018       POC Tests: No results for input(s): POCGLU, POCNA, POCK, POCCL, POCBUN, POCHEMO, POCHCT in the last 72 hours. Coags: No results found for: PROTIME, INR, APTT    HCG (If Applicable): No results found for: PREGTESTUR, PREGSERUM, HCG, HCGQUANT     ABGs: No results found for: PHART, PO2ART, QXV5NND, HHU9UND, BEART, H0NEWOHT     Type & Screen (If Applicable):  No results found for: LABABO, LABRH    Drug/Infectious Status (If Applicable):  No results found for: HIV, HEPCAB    COVID-19 Screening (If Applicable):   Lab Results   Component Value Date    COVID19 Not Detected 12/09/2020         Anesthesia Evaluation  Patient summary reviewed and Nursing notes reviewed   history of anesthetic complications: PONV.   Airway: Mallampati: II  TM distance: >3 FB   Neck ROM: full  Mouth opening: > = 3 FB Dental: normal exam         Pulmonary: breath sounds clear to auscultation  (+) current smoker                           Cardiovascular:Negative CV ROS  Exercise tolerance: good (>4 METS),           Rhythm: regular  Rate: normal           Beta Blocker:  Not on Beta Blocker Neuro/Psych:   (+) neuromuscular disease:, depression/anxiety              ROS comment: HNP  R sciatica GI/Hepatic/Renal: Neg GI/Hepatic/Renal ROS            Endo/Other:    (+) : arthritis:., .                 Abdominal:           Vascular:                                        Anesthesia Plan      general     ASA 2       Induction: intravenous. Anesthetic plan and risks discussed with patient and mother.                       Zeferino Jacinto MD   12/14/2020

## 2020-12-14 NOTE — PROGRESS NOTES
Dr. Veto Boucher called via perfect serve for orders. Call went to office and spoke to [facility].  She will text

## 2020-12-15 NOTE — OP NOTE
[id number] [address]                                OPERATIVE REPORT    PATIENT NAME: Melani Hoff              :        1975  MED REC NO:   [id number]                            ROOM:  ACCOUNT NO:   [de-identified]                           ADMIT DATE: 2020  PROVIDER:     Jaciel Davila MD    DATE OF PROCEDURE:  2020    PREOPERATIVE DIAGNOSIS:  Right dorsal wrist ganglion. POSTOPERATIVE DIAGNOSIS:  Right dorsal wrist ganglion. PROCEDURE PERFORMED:  Right dorsal wrist ganglion excision. SURGEON:  Jaciel Davila MD.    ASSISTANTS:  None. ANESTHESIA:  General.    COMPLICATIONS:  None. TOURNIQUET TIME:  33 minutes. BLOOD LOSS:  Minimal.    INDICATIONS:  This is a 51-year-old female with a symptomatic mass over  the dorsal aspect of her dominant right wrist, who has failed  conservative measures and after a lengthy discussion of risks, benefits,  and treatment alternatives, agreed to operative intervention. OPERATIVE FINDINGS:  Include sessile dorsal wrist ganglion at dorsal  radiocarpal joint and dorsal scapholunate area. This was underlying the  extensor pollicis longus tendon. Surrounding synovitis. DESCRIPTION OF PROCEDURE:  The patient was brought to the OR and placed  in the supine position. After adequate general anesthetic, tourniquet  applied to the right upper extremity. Arm was prepped and draped in a  sterile manner and arm exsanguinated with Esmarch. Tourniquet inflated  to 280 mmHg. A transverse incision created over the mass and carried  down through the skin and subcutaneous fat. Blunt dissection carried  down to the mass. It was noted that there was swelling and inflammation  surrounding the radial aspect of the fourth dorsal compartment and  beneath the third dorsal compartment.   Dorsal retinaculum divided over the top of mass, and mass and capsule resected with scalpel and scissor  dissection down to the scapholunate interval.  The mass was sent for  histopathologic evaluation. Hemostasis was gained with bipolar  electrocautery. Tenosynovectomy completed with scissor dissection. Tourniquet deflated at this point and hemostasis was gained with bipolar  electrocautery. Arm re-exsanguinated. Tourniquet re-inflated. Wound  was irrigated with copious amounts of antibiotic irrigant. Subcutaneous  tissue was reapproximated with 5-0 inverted Vicryl and skin edges were  reapproximated with a running 4-0 Prolene subcuticular suture. Benzoin  and Steri-Strips applied. Wound anesthetized with 0.5% Marcaine and 1%  lidocaine. Betadine, Adaptic, dry gauze, dry gauze fluffs, Kerlix,  Webril, and a well-padded volar splint applied and held in place with  Ace wrap. Tourniquet deflated at a total of 33 minutes. There were no  complications.         Meghan Patel MD    D: 12/14/2020 23:28:47       T: 12/15/2020 1:50:34     [email or web address]  Job#: [id number]     Doc#: [id number]    CC:

## 2021-01-13 ENCOUNTER — HOSPITAL ENCOUNTER (OUTPATIENT)
Dept: GENERAL RADIOLOGY | Age: 46
Discharge: HOME | End: 2021-01-15
Payer: COMMERCIAL

## 2021-01-13 DIAGNOSIS — Z01.419 WOMEN'S ANNUAL ROUTINE GYNECOLOGICAL EXAMINATION: ICD-10-CM

## 2021-01-13 DIAGNOSIS — Z12.31 SCREENING MAMMOGRAM, ENCOUNTER FOR: ICD-10-CM

## 2021-01-13 PROCEDURE — 77063 BREAST TOMOSYNTHESIS BI: CPT

## 2021-02-23 ENCOUNTER — HOSPITAL ENCOUNTER (OUTPATIENT)
Dept: GENERAL RADIOLOGY | Age: 46
Discharge: HOME | End: 2021-02-25
Payer: COMMERCIAL

## 2021-02-23 DIAGNOSIS — R92.2 DENSE BREAST TISSUE ON MAMMOGRAM: ICD-10-CM

## 2021-02-23 PROCEDURE — 76641 ULTRASOUND BREAST COMPLETE: CPT

## 2021-09-02 ENCOUNTER — OFFICE VISIT (OUTPATIENT)
Dept: FAMILY MEDICINE CLINIC | Age: 46
End: 2021-09-02
Payer: COMMERCIAL

## 2021-09-02 VITALS
BODY MASS INDEX: 30.99 KG/M2 | WEIGHT: 186 LBS | OXYGEN SATURATION: 98 % | DIASTOLIC BLOOD PRESSURE: 66 MMHG | RESPIRATION RATE: 18 BRPM | HEIGHT: 65 IN | TEMPERATURE: 98.1 F | SYSTOLIC BLOOD PRESSURE: 108 MMHG | HEART RATE: 68 BPM

## 2021-09-02 DIAGNOSIS — G89.29 CHRONIC BILATERAL LOW BACK PAIN WITHOUT SCIATICA: ICD-10-CM

## 2021-09-02 DIAGNOSIS — E55.9 VITAMIN D DEFICIENCY: ICD-10-CM

## 2021-09-02 DIAGNOSIS — N94.6 DYSMENORRHEA: ICD-10-CM

## 2021-09-02 DIAGNOSIS — Z00.00 ENCOUNTER FOR PREVENTIVE CARE: ICD-10-CM

## 2021-09-02 DIAGNOSIS — Z76.0 MEDICATION REFILL: ICD-10-CM

## 2021-09-02 DIAGNOSIS — F33.0 MILD EPISODE OF RECURRENT MAJOR DEPRESSIVE DISORDER (HCC): Primary | ICD-10-CM

## 2021-09-02 DIAGNOSIS — M54.50 CHRONIC BILATERAL LOW BACK PAIN WITHOUT SCIATICA: ICD-10-CM

## 2021-09-02 PROBLEM — G89.18 POSTOPERATIVE PAIN: Status: RESOLVED | Noted: 2020-12-14 | Resolved: 2021-09-02

## 2021-09-02 PROCEDURE — 99204 OFFICE O/P NEW MOD 45 MIN: CPT | Performed by: STUDENT IN AN ORGANIZED HEALTH CARE EDUCATION/TRAINING PROGRAM

## 2021-09-02 RX ORDER — ESCITALOPRAM OXALATE 20 MG/1
20 TABLET ORAL DAILY
Qty: 90 TABLET | Refills: 1 | Status: SHIPPED
Start: 2021-09-02 | End: 2022-03-24 | Stop reason: SDUPTHER

## 2021-09-02 SDOH — ECONOMIC STABILITY: FOOD INSECURITY: WITHIN THE PAST 12 MONTHS, THE FOOD YOU BOUGHT JUST DIDN'T LAST AND YOU DIDN'T HAVE MONEY TO GET MORE.: NEVER TRUE

## 2021-09-02 SDOH — ECONOMIC STABILITY: FOOD INSECURITY: WITHIN THE PAST 12 MONTHS, YOU WORRIED THAT YOUR FOOD WOULD RUN OUT BEFORE YOU GOT MONEY TO BUY MORE.: NEVER TRUE

## 2021-09-02 ASSESSMENT — PATIENT HEALTH QUESTIONNAIRE - PHQ9
SUM OF ALL RESPONSES TO PHQ9 QUESTIONS 1 & 2: 0
SUM OF ALL RESPONSES TO PHQ QUESTIONS 1-9: 0
1. LITTLE INTEREST OR PLEASURE IN DOING THINGS: 0
SUM OF ALL RESPONSES TO PHQ QUESTIONS 1-9: 0
SUM OF ALL RESPONSES TO PHQ QUESTIONS 1-9: 0
2. FEELING DOWN, DEPRESSED OR HOPELESS: 0

## 2021-09-02 ASSESSMENT — SOCIAL DETERMINANTS OF HEALTH (SDOH): HOW HARD IS IT FOR YOU TO PAY FOR THE VERY BASICS LIKE FOOD, HOUSING, MEDICAL CARE, AND HEATING?: NOT HARD AT ALL

## 2021-09-02 NOTE — PROGRESS NOTES
Patient:  Lennox Lyons 39 y.o. female     Date of Service: 9/2/21      Chiefcomplaint:   Chief Complaint   Patient presents with    Established New Doctor     History of Present Illness   Depression: wellbutrin and lexapro. Not sure if wellbutrin is helping. Has been on and off of it in the past.     Depression:  Subjective:   Sleep: a lot   Interests: photography  Guilt: mom trigger  Energy: low  Concentration: when tired  Appetite: ok  Psychomotor: agitation sometimes with period  Suicidal/Homicidal Ideation: no self harm, si. Counseling in the past when first diagnosed  Tried effexor and had bad reaction, tried zoloft and didn't work. Endometriosis - had laparoscopy and treatment for area around uterus. Still has pain on right side of abdominal wall - hurts with period. Tested  For hocm due for fam hx - no issues on echo - dr jeter     Recent labs- bmp ok, thyroid 2020, cbc ok    Recent mammo - neg    No cancers in family    Weight - improved diet and increased activity recently    Works at [employer]  No kids    Pertinent Medical, Family, Surgical, Social History:  Past Medical History:   Diagnosis Date    Depression     Endometriosis      Physical Exam   Vitals: /66 (Site: Right Upper Arm, Position: Sitting, Cuff Size: Large Adult)   Pulse 68   Temp 98.1 °F (36.7 °C) (Temporal)   Resp 18   Ht 5' 5\" (1.651 m)   Wt 186 lb (84.4 kg)   SpO2 98%   BMI 30.95 kg/m²   General Appearance: Alert, oriented, no acute distress  HEENT: No scleral icterus. No visible discharge from eyes  Neck: Not rigid. No visible masses  Chest wall/Lung: Clear to auscultation bilaterally,  respirations unlabored. No ronchi/wheezing/rales  Heart: RRR, no murmur  Abdomen: Soft, nontender  Extremities:  No edema  Skin: No rashes. No jaundice  Neuro: Alert and oriented        Psych: Appropriate mood and appropriate affect    Assessment and Plan   1.  Mild episode of recurrent major depressive disorder [facility])  Assessment & Plan:   Borderline controlled, We will continue Zoloft. We will stop Wellbutrin as it does not seem that is making a significant difference in this was added for reactive depressive episode due to loss of pet. Orders:  -     CBC Auto Differential; Future  -     Lipid Panel; Future  -     Comprehensive Metabolic Panel; Future  -     escitalopram (LEXAPRO) 20 MG tablet; Take 1 tablet by mouth daily, Disp-90 tablet, R-1Normal  2. Dysmenorrhea  Assessment & Plan:   Ongoing abdominal pain presumably due to endometriosis. She did have laparoscopic in the past that showed areas on the uterus. It is presumed that this is also causing her right-sided pain at this time. This is chronic. 3. Vitamin D deficiency  Assessment & Plan:   Recheck labs. Patient taking 1000 units daily. Previous vitamin D was 9. Orders:  -     Vitamin D 25 Hydroxy; Future  4. Chronic bilateral low back pain without sciatica  Assessment & Plan:   Gets treatment through chiropractor. Recommended some stretching exercises and demonstrated today. Patient to do Pilates. 5. Medication refill  6. Encounter for preventive care  -     HEMOGLOBIN A1C; Future      Return in about 6 months (around 3/2/2022) for DEPRESSION, TEST RESULTS. Oscar High, DO     This document may have been prepared at least partially through the use of voice recognition software. Although effort is taken to assure the accuracy of this document, it is possible that grammatical, syntax,  or spelling errors may occur.

## 2021-09-02 NOTE — ASSESSMENT & PLAN NOTE
Ongoing abdominal pain presumably due to endometriosis. She did have laparoscopic in the past that showed areas on the uterus. It is presumed that this is also causing her right-sided pain at this time. This is chronic.

## 2021-09-02 NOTE — ASSESSMENT & PLAN NOTE
Borderline controlled, We will continue Zoloft. We will stop Wellbutrin as it does not seem that is making a significant difference in this was added for reactive depressive episode due to loss of pet.

## 2021-09-02 NOTE — ASSESSMENT & PLAN NOTE
Gets treatment through chiropractor. Recommended some stretching exercises and demonstrated today. Patient to do Pilates.

## 2021-09-30 ENCOUNTER — TELEMEDICINE (OUTPATIENT)
Dept: FAMILY MEDICINE CLINIC | Age: 46
End: 2021-09-30
Payer: COMMERCIAL

## 2021-09-30 DIAGNOSIS — J01.00 ACUTE MAXILLARY SINUSITIS, RECURRENCE NOT SPECIFIED: Primary | ICD-10-CM

## 2021-09-30 PROCEDURE — 99442 PR PHYS/QHP TELEPHONE EVALUATION 11-20 MIN: CPT | Performed by: STUDENT IN AN ORGANIZED HEALTH CARE EDUCATION/TRAINING PROGRAM

## 2021-09-30 RX ORDER — FLUCONAZOLE 150 MG/1
TABLET ORAL
Qty: 2 TABLET | Refills: 0 | Status: SHIPPED
Start: 2021-09-30 | End: 2022-01-23

## 2021-09-30 RX ORDER — AMOXICILLIN AND CLAVULANATE POTASSIUM 875; 125 MG/1; MG/1
1 TABLET, FILM COATED ORAL 2 TIMES DAILY
Qty: 14 TABLET | Refills: 0 | Status: SHIPPED | OUTPATIENT
Start: 2021-09-30 | End: 2021-10-07

## 2021-09-30 NOTE — PROGRESS NOTES
Patient:  Nanci Beyer 39 y.o. female     Date of Service: 9/30/21      Chiefcomplaint: No chief complaint on file. History of Present Illness     Thursday with sinus drainage and cough and pressure. Got covid testing on Sunday. It was negative. Continuing to have increased pressure and cough and it is getting worse overall. No fever, chills, muscle aches  Pertinent Medical, Family, Surgical, Social History:  Past Medical History:   Diagnosis Date    Depression     Endometriosis        Assessment and Plan   1. Acute maxillary sinusitis, recurrence not specified  -     amoxicillin-clavulanate (AUGMENTIN) 875-125 MG per tablet; Take 1 tablet by mouth 2 times daily for 7 days, Disp-14 tablet, R-0Normal  -     fluconazole (DIFLUCAN) 150 MG tablet; Take 1 tablet. Repeat a 2nd dose in 72 hours if needed, Disp-2 tablet, R-0Normal      No follow-ups on file. Sharolyn Seip, DO     This document may have been prepared at least partially through the use of voice recognition software. Although effort is taken to assure the accuracy of this document, it is possible that grammatical, syntax,  or spelling errors may occur.

## 2021-10-07 ENCOUNTER — HOSPITAL ENCOUNTER (OUTPATIENT)
Age: 46
Discharge: HOME | End: 2021-10-07
Payer: COMMERCIAL

## 2021-10-07 DIAGNOSIS — E55.9 VITAMIN D DEFICIENCY: ICD-10-CM

## 2021-10-07 DIAGNOSIS — Z00.00 ENCOUNTER FOR PREVENTIVE CARE: ICD-10-CM

## 2021-10-07 DIAGNOSIS — F33.0 MILD EPISODE OF RECURRENT MAJOR DEPRESSIVE DISORDER (HCC): ICD-10-CM

## 2021-10-07 LAB
ALBUMIN SERPL-MCNC: 4.2 G/DL (ref 3.5–5.2)
ALP BLD-CCNC: 85 U/L (ref 35–104)
ALT SERPL-CCNC: 24 U/L (ref 0–32)
ANION GAP SERPL CALCULATED.3IONS-SCNC: 9 MMOL/L (ref 7–16)
AST SERPL-CCNC: 23 U/L (ref 0–31)
BASOPHILS ABSOLUTE: 0.04 E9/L (ref 0–0.2)
BASOPHILS RELATIVE PERCENT: 0.6 % (ref 0–2)
BILIRUB SERPL-MCNC: 0.4 MG/DL (ref 0–1.2)
BUN BLDV-MCNC: 8 MG/DL (ref 6–20)
CALCIUM SERPL-MCNC: 9.4 MG/DL (ref 8.6–10.2)
CHLORIDE BLD-SCNC: 105 MMOL/L (ref 98–107)
CHOLESTEROL, TOTAL: 174 MG/DL (ref 0–199)
CO2: 27 MMOL/L (ref 22–29)
CREAT SERPL-MCNC: 1 MG/DL (ref 0.5–1)
EOSINOPHILS ABSOLUTE: 0.39 E9/L (ref 0.05–0.5)
EOSINOPHILS RELATIVE PERCENT: 5.7 % (ref 0–6)
GFR AFRICAN AMERICAN: >60
GFR NON-AFRICAN AMERICAN: >60 ML/MIN/1.73
GLUCOSE BLD-MCNC: 88 MG/DL (ref 74–99)
HBA1C MFR BLD: 4.7 % (ref 4–5.6)
HCT VFR BLD CALC: 37.8 % (ref 34–48)
HDLC SERPL-MCNC: 58 MG/DL
HEMOGLOBIN: 13.4 G/DL (ref 11.5–15.5)
IMMATURE GRANULOCYTES #: 0.02 E9/L
IMMATURE GRANULOCYTES %: 0.3 % (ref 0–5)
LDL CHOLESTEROL CALCULATED: 96 MG/DL (ref 0–99)
LYMPHOCYTES ABSOLUTE: 2.09 E9/L (ref 1.5–4)
LYMPHOCYTES RELATIVE PERCENT: 30.6 % (ref 20–42)
MCH RBC QN AUTO: 32.9 PG (ref 26–35)
MCHC RBC AUTO-ENTMCNC: 35.4 % (ref 32–34.5)
MCV RBC AUTO: 92.9 FL (ref 80–99.9)
MONOCYTES ABSOLUTE: 0.63 E9/L (ref 0.1–0.95)
MONOCYTES RELATIVE PERCENT: 9.2 % (ref 2–12)
NEUTROPHILS ABSOLUTE: 3.66 E9/L (ref 1.8–7.3)
NEUTROPHILS RELATIVE PERCENT: 53.6 % (ref 43–80)
PDW BLD-RTO: 11.9 FL (ref 11.5–15)
PLATELET # BLD: 280 E9/L (ref 130–450)
PMV BLD AUTO: 9.5 FL (ref 7–12)
POTASSIUM SERPL-SCNC: 4.4 MMOL/L (ref 3.5–5)
RBC # BLD: 4.07 E12/L (ref 3.5–5.5)
SODIUM BLD-SCNC: 141 MMOL/L (ref 132–146)
TOTAL PROTEIN: 6.4 G/DL (ref 6.4–8.3)
TRIGL SERPL-MCNC: 99 MG/DL (ref 0–149)
VITAMIN D 25-HYDROXY: 51 NG/ML (ref 30–100)
VLDLC SERPL CALC-MCNC: 20 MG/DL
WBC # BLD: 6.8 E9/L (ref 4.5–11.5)

## 2021-10-07 PROCEDURE — 85025 COMPLETE CBC W/AUTO DIFF WBC: CPT

## 2021-10-07 PROCEDURE — 80061 LIPID PANEL: CPT

## 2021-10-07 PROCEDURE — 83036 HEMOGLOBIN GLYCOSYLATED A1C: CPT

## 2021-10-07 PROCEDURE — 80053 COMPREHEN METABOLIC PANEL: CPT

## 2021-10-07 PROCEDURE — 82306 VITAMIN D 25 HYDROXY: CPT

## 2021-10-07 PROCEDURE — 36415 COLL VENOUS BLD VENIPUNCTURE: CPT

## 2022-01-18 ENCOUNTER — OFFICE VISIT (OUTPATIENT)
Dept: PRIMARY CARE CLINIC | Age: 47
End: 2022-01-18
Payer: COMMERCIAL

## 2022-01-18 VITALS
TEMPERATURE: 97.8 F | HEART RATE: 98 BPM | BODY MASS INDEX: 27.66 KG/M2 | OXYGEN SATURATION: 99 % | SYSTOLIC BLOOD PRESSURE: 115 MMHG | WEIGHT: 166 LBS | DIASTOLIC BLOOD PRESSURE: 56 MMHG | HEIGHT: 65 IN | RESPIRATION RATE: 20 BRPM

## 2022-01-18 DIAGNOSIS — R10.9 CHRONIC ABDOMINAL PAIN: Primary | ICD-10-CM

## 2022-01-18 DIAGNOSIS — R14.3 FLATULENCE: ICD-10-CM

## 2022-01-18 DIAGNOSIS — R11.0 NAUSEA: ICD-10-CM

## 2022-01-18 DIAGNOSIS — G89.29 CHRONIC ABDOMINAL PAIN: Primary | ICD-10-CM

## 2022-01-18 PROCEDURE — 99213 OFFICE O/P EST LOW 20 MIN: CPT | Performed by: NURSE PRACTITIONER

## 2022-01-18 RX ORDER — PANTOPRAZOLE SODIUM 40 MG/1
40 TABLET, DELAYED RELEASE ORAL
Qty: 30 TABLET | Refills: 2 | Status: SHIPPED
Start: 2022-01-18 | End: 2022-01-27

## 2022-01-18 NOTE — PROGRESS NOTES
Chief Complaint:   Nausea, Gas, and Abdominal Cramping      History of Present Illness   Source of history provided by:  patient. Betsy Gates is a 42 y.o. old female with a past medical history of:   Past Medical History:   Diagnosis Date    Depression     Endometriosis        Pt  presents to the [facility] care with nausea/gas/cramping for the past 1 month. No fever noted. Pt is concerned about possible stomach ulcer. Pt denies bloody/tarry stools, heartburn, diarrhea, constipation, painful BMs,, dysphagia, abnormal weight loss or any other concerning issues. Denies h/o Diverticulitis, IBS or H Pylori. Pt is requesting appointment w/PCP for diagnostic testing, pt is not currently on any OCt medications for this issue       ROS    Unless otherwise stated in this report or unable to obtain because of the patient's clinical or mental status as evidenced by the medical record, this patients's positive and negative responses for Review of Systems, constitutional, psych, eyes, ENT, cardiovascular, respiratory, gastrointestinal, neurological, genitourinary, musculoskeletal, integument systems and systems related to the presenting problem are either stated in the preceding or were not pertinent or were negative for the symptoms and/or complaints related to the medical problem. Past Surgical History:  has a past surgical history that includes Tonsillectomy; laparoscopy (3031,6334); Appendectomy (2009); Cholecystectomy (1994); and Hand surgery (Right, 12/14/2020). Social History:  reports that she has never smoked. She has never used smokeless tobacco. She reports that she does not drink alcohol and does not use drugs. Family History: family history includes Heart Disease in her father; Other in her mother.    Allergies: Compazine [prochlorperazine maleate] and Codeine    Physical Exam         VS:  BP (!) 115/56 (Site: Left Upper Arm, Position: Sitting, Cuff Size: Large Adult)   Pulse 98   Temp 97.8 °F (36.6 °C) (Temporal)   Resp 20   Ht 5' 5\" (1.651 m)   Wt 166 lb (75.3 kg)   SpO2 99%   BMI 27.62 kg/m²    Oxygen Saturation Interpretation: Normal.    Constitutional:  Alert, development consistent with age. Ears:  External Ears: Normal bilateral pinna. TM's & External Canals: TM's normal bilaterally without perforation. Canals without erythema or drainage. Nose:   There is no obvious septal defect. Mouth:  Moist bucca mucosa and normal tongue. Neck:  Supple. There is no obvious adenopathy or neck tenderness. Lungs:   Breath sounds: Normal chest expansion and breath sounds noted throughout. no wheezes, rales, or rhonchi noted. Heart:  Regular rate and rhythm, normal heart sounds, without pathological murmurs, ectopy, gallops, or rubs. Abdomen:  Soft, non tender, BS hypoactive x 4, no masses or organomegaly, no rebound tenderness or guarding  Skin:  Normal turgor. Warm, dry, without visible rash. Neurological:  Oriented. Motor functions intact. Lab / Imaging Results   (All laboratory and radiology results have been personally reviewed by myself)  Labs:  No results found for this visit on 01/18/22. Imaging: All Radiology results interpreted by Radiologist unless otherwise noted. No orders to display         Assessment / Plan     Impression(s):  1. Chronic abdominal pain    2. Flatulence    3. Nausea      Disposition:  Disposition: Will start Protonix, continue bland diet, scheduled appointment this week w/PCP for possible Endoscopy/Colonoscopy    . ER if changes or worse. Advised to take medication as directed.

## 2022-01-23 ENCOUNTER — APPOINTMENT (OUTPATIENT)
Dept: CT IMAGING | Age: 47
DRG: 149 | End: 2022-01-23
Payer: COMMERCIAL

## 2022-01-23 ENCOUNTER — APPOINTMENT (OUTPATIENT)
Dept: GENERAL RADIOLOGY | Age: 47
DRG: 149 | End: 2022-01-23
Payer: COMMERCIAL

## 2022-01-23 ENCOUNTER — NURSE TRIAGE (OUTPATIENT)
Dept: OTHER | Facility: CLINIC | Age: 47
End: 2022-01-23

## 2022-01-23 ENCOUNTER — HOSPITAL ENCOUNTER (INPATIENT)
Age: 47
LOS: 1 days | Discharge: HOME | DRG: 149 | End: 2022-01-24
Attending: EMERGENCY MEDICINE | Admitting: INTERNAL MEDICINE
Payer: COMMERCIAL

## 2022-01-23 DIAGNOSIS — R42 DIZZINESS: ICD-10-CM

## 2022-01-23 DIAGNOSIS — R29.90 STROKE-LIKE SYMPTOMS: Primary | ICD-10-CM

## 2022-01-23 LAB
ANION GAP SERPL CALCULATED.3IONS-SCNC: 9 MMOL/L (ref 7–16)
APTT: 31.9 SEC (ref 24.5–35.1)
BACTERIA: NORMAL /HPF
BASOPHILS ABSOLUTE: 0.03 E9/L (ref 0–0.2)
BASOPHILS RELATIVE PERCENT: 0.5 % (ref 0–2)
BILIRUBIN URINE: NEGATIVE
BLOOD, URINE: NORMAL
BUN BLDV-MCNC: 5 MG/DL (ref 6–20)
CALCIUM SERPL-MCNC: 9 MG/DL (ref 8.6–10.2)
CHLORIDE BLD-SCNC: 106 MMOL/L (ref 98–107)
CLARITY: CLEAR
CO2: 25 MMOL/L (ref 22–29)
COLOR: YELLOW
CREAT SERPL-MCNC: 0.8 MG/DL (ref 0.5–1)
EOSINOPHILS ABSOLUTE: 0.16 E9/L (ref 0.05–0.5)
EOSINOPHILS RELATIVE PERCENT: 2.5 % (ref 0–6)
EPITHELIAL CELLS, UA: NORMAL /HPF
GFR AFRICAN AMERICAN: >60
GFR NON-AFRICAN AMERICAN: >60 ML/MIN/1.73
GLUCOSE BLD-MCNC: 102 MG/DL (ref 74–99)
GLUCOSE URINE: NEGATIVE MG/DL
HCG(URINE) PREGNANCY TEST: NEGATIVE
HCT VFR BLD CALC: 37.6 % (ref 34–48)
HEMOGLOBIN: 13.3 G/DL (ref 11.5–15.5)
IMMATURE GRANULOCYTES #: 0.01 E9/L
IMMATURE GRANULOCYTES %: 0.2 % (ref 0–5)
INR BLD: 1.1
KETONES, URINE: NEGATIVE MG/DL
LEUKOCYTE ESTERASE, URINE: NEGATIVE
LYMPHOCYTES ABSOLUTE: 1.65 E9/L (ref 1.5–4)
LYMPHOCYTES RELATIVE PERCENT: 26.1 % (ref 20–42)
MCH RBC QN AUTO: 32.5 PG (ref 26–35)
MCHC RBC AUTO-ENTMCNC: 35.4 % (ref 32–34.5)
MCV RBC AUTO: 91.9 FL (ref 80–99.9)
MONOCYTES ABSOLUTE: 0.56 E9/L (ref 0.1–0.95)
MONOCYTES RELATIVE PERCENT: 8.8 % (ref 2–12)
NEUTROPHILS ABSOLUTE: 3.92 E9/L (ref 1.8–7.3)
NEUTROPHILS RELATIVE PERCENT: 61.9 % (ref 43–80)
NITRITE, URINE: NEGATIVE
PDW BLD-RTO: 11.9 FL (ref 11.5–15)
PH UA: 6 (ref 5–9)
PLATELET # BLD: 232 E9/L (ref 130–450)
PMV BLD AUTO: 10.2 FL (ref 7–12)
POTASSIUM SERPL-SCNC: 3.8 MMOL/L (ref 3.5–5)
PROTEIN UA: NEGATIVE MG/DL
PROTHROMBIN TIME: 11.9 SEC (ref 9.3–12.4)
RBC # BLD: 4.09 E12/L (ref 3.5–5.5)
RBC UA: NORMAL /HPF (ref 0–2)
SODIUM BLD-SCNC: 140 MMOL/L (ref 132–146)
SPECIFIC GRAVITY UA: <=1.005 (ref 1–1.03)
TROPONIN, HIGH SENSITIVITY: <6 NG/L (ref 0–9)
UROBILINOGEN, URINE: 0.2 E.U./DL
WBC # BLD: 6.3 E9/L (ref 4.5–11.5)
WBC UA: NORMAL /HPF (ref 0–5)

## 2022-01-23 PROCEDURE — 85610 PROTHROMBIN TIME: CPT

## 2022-01-23 PROCEDURE — 36415 COLL VENOUS BLD VENIPUNCTURE: CPT

## 2022-01-23 PROCEDURE — 1200000000 HC SEMI PRIVATE

## 2022-01-23 PROCEDURE — 2580000003 HC RX 258: Performed by: EMERGENCY MEDICINE

## 2022-01-23 PROCEDURE — 93005 ELECTROCARDIOGRAM TRACING: CPT | Performed by: EMERGENCY MEDICINE

## 2022-01-23 PROCEDURE — 96374 THER/PROPH/DIAG INJ IV PUSH: CPT

## 2022-01-23 PROCEDURE — 2580000003 HC RX 258: Performed by: STUDENT IN AN ORGANIZED HEALTH CARE EDUCATION/TRAINING PROGRAM

## 2022-01-23 PROCEDURE — 81025 URINE PREGNANCY TEST: CPT

## 2022-01-23 PROCEDURE — 85025 COMPLETE CBC W/AUTO DIFF WBC: CPT

## 2022-01-23 PROCEDURE — 70496 CT ANGIOGRAPHY HEAD: CPT

## 2022-01-23 PROCEDURE — 84484 ASSAY OF TROPONIN QUANT: CPT

## 2022-01-23 PROCEDURE — 6360000004 HC RX CONTRAST MEDICATION: Performed by: EMERGENCY MEDICINE

## 2022-01-23 PROCEDURE — 81001 URINALYSIS AUTO W/SCOPE: CPT

## 2022-01-23 PROCEDURE — 6370000000 HC RX 637 (ALT 250 FOR IP): Performed by: STUDENT IN AN ORGANIZED HEALTH CARE EDUCATION/TRAINING PROGRAM

## 2022-01-23 PROCEDURE — 80048 BASIC METABOLIC PNL TOTAL CA: CPT

## 2022-01-23 PROCEDURE — 6360000002 HC RX W HCPCS: Performed by: EMERGENCY MEDICINE

## 2022-01-23 PROCEDURE — 71045 X-RAY EXAM CHEST 1 VIEW: CPT

## 2022-01-23 PROCEDURE — 6360000002 HC RX W HCPCS: Performed by: STUDENT IN AN ORGANIZED HEALTH CARE EDUCATION/TRAINING PROGRAM

## 2022-01-23 PROCEDURE — 6370000000 HC RX 637 (ALT 250 FOR IP): Performed by: EMERGENCY MEDICINE

## 2022-01-23 PROCEDURE — 99284 EMERGENCY DEPT VISIT MOD MDM: CPT

## 2022-01-23 PROCEDURE — 85730 THROMBOPLASTIN TIME PARTIAL: CPT

## 2022-01-23 RX ORDER — ASPIRIN 81 MG/1
81 TABLET, CHEWABLE ORAL DAILY
Status: DISCONTINUED | OUTPATIENT
Start: 2022-01-23 | End: 2022-01-24 | Stop reason: HOSPADM

## 2022-01-23 RX ORDER — SODIUM CHLORIDE 0.9 % (FLUSH) 0.9 %
5-40 SYRINGE (ML) INJECTION EVERY 12 HOURS SCHEDULED
Status: DISCONTINUED | OUTPATIENT
Start: 2022-01-23 | End: 2022-01-24 | Stop reason: HOSPADM

## 2022-01-23 RX ORDER — ONDANSETRON 4 MG/1
4 TABLET, ORALLY DISINTEGRATING ORAL EVERY 8 HOURS PRN
Status: DISCONTINUED | OUTPATIENT
Start: 2022-01-23 | End: 2022-01-24 | Stop reason: HOSPADM

## 2022-01-23 RX ORDER — ONDANSETRON 2 MG/ML
4 INJECTION INTRAMUSCULAR; INTRAVENOUS ONCE
Status: COMPLETED | OUTPATIENT
Start: 2022-01-23 | End: 2022-01-23

## 2022-01-23 RX ORDER — ESCITALOPRAM OXALATE 10 MG/1
20 TABLET ORAL DAILY
Status: DISCONTINUED | OUTPATIENT
Start: 2022-01-24 | End: 2022-01-24 | Stop reason: HOSPADM

## 2022-01-23 RX ORDER — SODIUM CHLORIDE 0.9 % (FLUSH) 0.9 %
5-40 SYRINGE (ML) INJECTION PRN
Status: DISCONTINUED | OUTPATIENT
Start: 2022-01-23 | End: 2022-01-24 | Stop reason: HOSPADM

## 2022-01-23 RX ORDER — PANTOPRAZOLE SODIUM 40 MG/1
40 TABLET, DELAYED RELEASE ORAL
Status: DISCONTINUED | OUTPATIENT
Start: 2022-01-24 | End: 2022-01-24 | Stop reason: HOSPADM

## 2022-01-23 RX ORDER — 0.9 % SODIUM CHLORIDE 0.9 %
1000 INTRAVENOUS SOLUTION INTRAVENOUS ONCE
Status: COMPLETED | OUTPATIENT
Start: 2022-01-23 | End: 2022-01-23

## 2022-01-23 RX ORDER — ACETAMINOPHEN 325 MG/1
650 TABLET ORAL EVERY 6 HOURS PRN
Status: DISCONTINUED | OUTPATIENT
Start: 2022-01-23 | End: 2022-01-24 | Stop reason: HOSPADM

## 2022-01-23 RX ORDER — ATORVASTATIN CALCIUM 20 MG/1
20 TABLET, FILM COATED ORAL NIGHTLY
Status: DISCONTINUED | OUTPATIENT
Start: 2022-01-23 | End: 2022-01-24 | Stop reason: HOSPADM

## 2022-01-23 RX ORDER — SODIUM CHLORIDE 9 MG/ML
25 INJECTION, SOLUTION INTRAVENOUS PRN
Status: DISCONTINUED | OUTPATIENT
Start: 2022-01-23 | End: 2022-01-24 | Stop reason: HOSPADM

## 2022-01-23 RX ORDER — ASCORBIC ACID 500 MG
1000 TABLET ORAL DAILY
Status: DISCONTINUED | OUTPATIENT
Start: 2022-01-23 | End: 2022-01-24 | Stop reason: HOSPADM

## 2022-01-23 RX ORDER — DIAZEPAM 5 MG/1
5 TABLET ORAL ONCE
Status: COMPLETED | OUTPATIENT
Start: 2022-01-23 | End: 2022-01-23

## 2022-01-23 RX ORDER — MECLIZINE HCL 12.5 MG/1
25 TABLET ORAL ONCE
Status: COMPLETED | OUTPATIENT
Start: 2022-01-23 | End: 2022-01-23

## 2022-01-23 RX ORDER — POLYETHYLENE GLYCOL 3350 17 G/17G
17 POWDER, FOR SOLUTION ORAL DAILY PRN
Status: DISCONTINUED | OUTPATIENT
Start: 2022-01-23 | End: 2022-01-24 | Stop reason: HOSPADM

## 2022-01-23 RX ORDER — VITAMIN B COMPLEX
1000 TABLET ORAL DAILY
Status: DISCONTINUED | OUTPATIENT
Start: 2022-01-24 | End: 2022-01-24 | Stop reason: HOSPADM

## 2022-01-23 RX ORDER — ONDANSETRON 2 MG/ML
4 INJECTION INTRAMUSCULAR; INTRAVENOUS EVERY 6 HOURS PRN
Status: DISCONTINUED | OUTPATIENT
Start: 2022-01-23 | End: 2022-01-24 | Stop reason: HOSPADM

## 2022-01-23 RX ORDER — ACETAMINOPHEN 650 MG/1
650 SUPPOSITORY RECTAL EVERY 6 HOURS PRN
Status: DISCONTINUED | OUTPATIENT
Start: 2022-01-23 | End: 2022-01-24 | Stop reason: HOSPADM

## 2022-01-23 RX ADMIN — MECLIZINE 25 MG: 12.5 TABLET ORAL at 11:49

## 2022-01-23 RX ADMIN — IOPAMIDOL 75 ML: 755 INJECTION, SOLUTION INTRAVENOUS at 11:28

## 2022-01-23 RX ADMIN — ASPIRIN 81 MG 81 MG: 81 TABLET ORAL at 17:32

## 2022-01-23 RX ADMIN — SODIUM CHLORIDE, PRESERVATIVE FREE 10 ML: 5 INJECTION INTRAVENOUS at 20:20

## 2022-01-23 RX ADMIN — DIAZEPAM 5 MG: 5 TABLET ORAL at 13:27

## 2022-01-23 RX ADMIN — ONDANSETRON 4 MG: 2 INJECTION INTRAMUSCULAR; INTRAVENOUS at 11:49

## 2022-01-23 RX ADMIN — ENOXAPARIN SODIUM 40 MG: 100 INJECTION SUBCUTANEOUS at 17:33

## 2022-01-23 RX ADMIN — SODIUM CHLORIDE 1000 ML: 9 INJECTION, SOLUTION INTRAVENOUS at 13:27

## 2022-01-23 ASSESSMENT — PAIN DESCRIPTION - DESCRIPTORS
DESCRIPTORS: HEADACHE
DESCRIPTORS: HEADACHE

## 2022-01-23 ASSESSMENT — PAIN DESCRIPTION - PAIN TYPE: TYPE: ACUTE PAIN

## 2022-01-23 ASSESSMENT — PAIN SCALES - GENERAL
PAINLEVEL_OUTOF10: 5
PAINLEVEL_OUTOF10: 2

## 2022-01-23 ASSESSMENT — PAIN DESCRIPTION - FREQUENCY
FREQUENCY: CONTINUOUS
FREQUENCY: INTERMITTENT

## 2022-01-23 ASSESSMENT — PAIN DESCRIPTION - ONSET: ONSET: PROGRESSIVE

## 2022-01-23 ASSESSMENT — PAIN DESCRIPTION - LOCATION: LOCATION: HEAD

## 2022-01-23 NOTE — H&P
Hospital Medicine History & Physical      PCP: Radha Kong DO    Date of Admission: 1/23/2022    Date of Service: Pt seen/examined on 1/23/2020 and Admitted to Inpatient with expected LOS greater than two midnights due to medical therapy. Chief Complaint: Numbness, tingling in bilateral extremities along with dizziness      History Of Present Illness:      42 y.o. female who presented to [facility] with complaints of numbness, tingling which started yesterday evening and continued to persist.  Patient had been watching her monitor for prolonged duration and did notice that she has been having some severe headache, tingling which she noticed first in the left hand and then into the right arm. She started to stand up and noticed that she was swaying to the left. Patient did not notice any blurry vision associated, loss of consciousness either. Diarrhea, constipation, urination problem associated with it. Patient does not have any prior history of similar complaints, no previous history of TIA, stroke, myocardial infarction, heart failure. Patient does have a history of chronic depression for which she is on Lexapro 20 mg. She was previously on Wellbutrin but not currently on the same as not found to be effective for her. Patient does not complain of any tinnitus, anxiety, palpitations. Past Medical History:          Diagnosis Date    Depression     Endometriosis        Past Surgical History:          Procedure Laterality Date    APPENDECTOMY  2009    CHOLECYSTECTOMY  1994    HAND SURGERY Right 12/14/2020    RIGHT DORSAL WRIST GANGLION EXCISION performed by Angelo Martinez MD at [facility] [facility] [place]  [phone]    pain in abdomen    TONSILLECTOMY         Medications Prior to Admission:      Prior to Admission medications    Medication Sig Start Date End Date Taking?  Authorizing Provider   pantoprazole (PROTONIX) 40 MG tablet Take 1 tablet by mouth every morning (before breakfast) 1/18/22  Yes ROYCE Best - CNP   escitalopram (LEXAPRO) 20 MG tablet Take 1 tablet by mouth daily 9/2/21 3/1/22 Yes Artie Bhagat DO   vitamin D 25 MCG (1000 UT) CAPS Take 1,000 Units by mouth daily   Yes Historical Provider, MD   ascorbic acid (VITAMIN C) 1000 MG tablet Take 1,000 mg by mouth daily   Yes Historical Provider, MD   Multiple Vitamin (MULTI-VITAMIN DAILY PO) Take by mouth daily   Yes Historical Provider, MD       Allergies:  Compazine [prochlorperazine maleate] and Codeine    Social History:      The patient currently lives at home    TOBACCO:   reports that she has never smoked. She has never used smokeless tobacco.  ETOH:   reports no history of alcohol use. Family History:       Reviewed in detail and negative for DM, CAD, Cancer, CVA. Positive as follows:        Problem Relation Age of Onset    Other Mother         hocm, sarcoid, fibromyalgia    Heart Disease Father        REVIEW OF SYSTEMS:   Pertinent positives as noted in the HPI. All other systems reviewed and negative. PHYSICAL EXAM:    /60   Pulse 66   Temp 98 °F (36.7 °C)   Resp 16   SpO2 99%     General appearance:  No apparent distress, appears stated age and cooperative. HEENT:  Normal cephalic, atraumatic without obvious deformity. Pupils equal, round, and reactive to light. Extra ocular muscles intact. Conjunctivae/corneas clear. Neck: Supple, with full range of motion. No jugular venous distention. Trachea midline. Respiratory:  Normal respiratory effort. Clear to auscultation, bilaterally without Rales/Wheezes/Rhonchi. Cardiovascular:  Regular rate and rhythm with normal S1/S2 without murmurs, rubs or gallops. Abdomen: Soft, non-tender, non-distended with normal bowel sounds. Musculoskeletal:  No clubbing, cyanosis or edema bilaterally. Full range of motion without deformity. Skin: Skin color, texture, turgor normal.  No rashes or lesions.   Neurologic: No focal deficit, no decreased sensations, strength 5/ 5 bilateral lower extremities and upper extremities  Psychiatric:  Alert and oriented, thought content appropriate, normal insight    CXR:  I have reviewed the CXR with the following interpretation: No acute findings  EKG:  I have reviewed the EKG with the following interpretation: Normal sinus rhythm    Labs:     Recent Labs     01/23/22  0950   WBC 6.3   HGB 13.3   HCT 37.6        Recent Labs     01/23/22  0950      K 3.8      CO2 25   BUN 5*   CREATININE 0.8   CALCIUM 9.0     No results for input(s): AST, ALT, BILIDIR, BILITOT, ALKPHOS in the last 72 hours. Recent Labs     01/23/22  1117   INR 1.1     No results for input(s): Adithya Killings in the last 72 hours. Urinalysis:      Lab Results   Component Value Date    NITRU Negative 01/23/2022    WBCUA 0-1 01/23/2022    BACTERIA NONE SEEN 01/23/2022    RBCUA 0-1 01/23/2022    BLOODU TRACE-LYSED 01/23/2022    SPECGRAV <=1.005 01/23/2022    GLUCOSEU Negative 01/23/2022         ASSESSMENT:    Active Hospital Problems    Diagnosis Date Noted    Stroke-like symptoms [R29.90] 01/23/2022   Chronic depression  GERD    PLAN:  -Admit to inpatient with telemetry  -CTA of the head and neck reviewed-showed no acute findings  -MRI of the brain ordered-pending.  -No previous risk factors for TIA/stroke  Neurology consulted-appreciate recommendations  ENT consulted-appreciate recommendations  -Echo ordered-pending  -Started on aspirin and statin  -Restratification labs ordered  -A1c reviewed from October 2021-4.9.  -Patient currently has myopic corrective lenses and has last seen her ophthalmologist back in April with no changes in prescription. Currently does not complain of any blurry visions, vision abnormalities.  -Orthostatic vitals ordered  -We will continue regular diet after bedside swallow. Patient appears to be doing well.   No dysarthria    DVT Prophylaxis: Lovenox  Diet: No diet orders on file  Code Status: Full Code      Dispo -inpatient with telemetry       Jose Elias Camarillo MD

## 2022-01-23 NOTE — TELEPHONE ENCOUNTER
Subjective: Caller states \"I'm having dizziness. \"     Current Symptoms: Pt reports dizziness. Pt states, \"I feel like my head is on a string. \" She is feeling dizzy both at rest and when she is moving around. Dizziness does get worse when she moves around. She is requiring support when she walks. She almost fell out of bed this morning. She has had dizzy spells before, but it has never lasted this long nor been this severe. She does have a h/o low blood pressure. She is not able to check her blood pressure currently. She did start taking Protonix 3 days ago. She is not sure if this is related to that. Onset: Yesterday    Associated Symptoms: Nausea    Pain Severity: She does report a mild headache currently     Temperature: Denies fever    LMP: 1/11/2022 Pregnant: No    Recommended disposition: Go to the ED. Care advice provided, patient verbalizes understanding; denies any other questions or concerns; instructed to call back for any new or worsening symptoms. Patient/caller proceeding to [facility] Emergency [facility]    Attention Provider: Thank you for allowing me to participate in the care of your patient. The patient was connected to triage in response to symptoms provided. Please do not respond through this encounter as the response is not directed to a shared pool.     Reason for Disposition   SEVERE dizziness (e.g., unable to stand, requires support to walk, feels like passing out now)    Protocols used: [facility]

## 2022-01-23 NOTE — PROGRESS NOTES
New consult per Dr. Quentin Riley for Dizziness, balance issues.  Message sent to Dr Gin SOUZA on call

## 2022-01-23 NOTE — PROGRESS NOTES
New consult  per Dr. Kalee Mustafa TIA vs stroke . Message sent to Dr. Rodrigo Dang on call for neurology .

## 2022-01-23 NOTE — ED PROVIDER NOTES
HPI:  1/23/22, Time: 11:01 AM JED Ruby is a 42 y.o. female presenting to the ED for dizziness of sudden onset, beginning yesterday sometime between 7pm - 9pm.  It is associated with some mild nausea, but no vomiting. It is a spinning type sensation but does not seem to be positional.  She went to bed last night thinking it would go away, but awoke this morning with the same dizziness and a headache. Head trauma, fever or chills, neck pain or stiffness, difficulty breathing or swallowing, focal paresthesias, focal weakness, neck or back pain. She does report yesterday and this morning of a couple fleeting episodes of bilateral arm tingling while she was leaning over the table looking down but they were short-lived and now resolved. The complaint has been persistent, moderate in severity, and worsened by nothing. Patient denies fever/chills, sore throat, cough, congestion, chest pain, shortness of breath, edema,  visual disturbances, focal paresthesias, focal weakness, abdominal pain, nausea, vomiting, diarrhea, constipation, dysuria, hematuria, trauma, neck or back pain, rash or other complaints. ROS:   A complete review of systems was performed and all pertinent positives and negatives are stated within HPI, all other systems reviewed and are negative.      --------------------------------------------- PAST HISTORY ---------------------------------------------  Past Medical History:  has a past medical history of Depression and Endometriosis. Past Surgical History:  has a past surgical history that includes Tonsillectomy; laparoscopy (0130,2232); Appendectomy (2009); Cholecystectomy (1994); and Hand surgery (Right, 12/14/2020). Social History:  reports that she has never smoked. She has never used smokeless tobacco. She reports that she does not drink alcohol and does not use drugs. Family History: family history includes Heart Disease in her father;  Other in her mother. The patients home medications have been reviewed. Allergies: Compazine [prochlorperazine maleate] and Codeine        ----------------------------------------PHYSICAL EXAM--------------------------------------  Constitutional:  Well developed, well nourished, no acute distress, non-toxic appearance   Eyes:  PERRL, conjunctiva normal, EOMI, globes intact, no horizontal nystagmus. Bilateral vertical nystagmus noted  HENT:  Atraumatic, external ears normal, nose normal, oropharynx moist, no pharyngeal exudates, redness or swelling. TMs clear and intact bilaterally. Airway patent. Neck- normal range of motion, no nuchal rigidity. No carotid bruits bilaterally. Respiratory:  No respiratory distress, normal breath sounds, no rales, no wheezing   Cardiovascular:  Normal rate, normal rhythm, no murmurs, no gallops, no rubs. Radial and DP pulses 2+ bilaterally. GI:  Soft, nondistended, normal bowel sounds, nontender, no organomegaly, no mass, no rebound, no guarding   :  No costovertebral angle tenderness   Musculoskeletal:  No edema, no tenderness, no deformities. Back- no tenderness  Integument:  Well hydrated, no visible rash. Adequate perfusion. Lymphatic:  No cervical lymphadenopathy noted   Neurologic:  Alert & oriented x 3, CN 2-12 normal, normal motor function, normal sensory function, no focal deficits noted. DTRs 2+ bilateral patellar. Normal gait. Psychiatric:  Speech and behavior appropriate     NIH Stroke Scale/Score at time of initial evaluation:  1A: Level of Consciousness 0 - alert; keenly responsive   1B: Ask Month and Age 0 - answers both questions correctly   1C:  Tell Patient To Open and Close Eyes, then Hand  Squeeze 0 - performs both tasks correctly   2: Test Horizontal Extraocular Movements 0 - normal   3: Test Visual Fields 0 - no visual loss   4: Test Facial Palsy 0 - normal symmetric movement   5A: Test Left Arm Motor Drift 0 - no drift, limb holds 90 (or 45) degrees for full 10 seconds   5B: Test Right Arm Motor Drift 0 - no drift, limb holds 90 (or 45) degrees for full 10 seconds   6A: Test Left Leg Motor Drift 0 - no drift; leg holds 30 degree position for full 5 seconds   6B: Test Right Leg Motor Drift 0 - no drift; leg holds 30 degree position for full 5 seconds   7: Test Limb Ataxia   (FNF/Heel-Shin) 0 - absent   8: Test Sensation 0 - normal; no sensory loss   9: Test Language/Aphasia 0 - no aphasia, normal   10: Test Dysarthria 0 - normal   11: Test Extinction/Inattention 0 - no abnormality   Total Score: 0   1/23/22 at 11:02 AM EST. Acute CVA Core Measures:      - t-PA Eligibility: IV t-PA was considered and not given due to violations in inclusion criteria including stroke onset was greater than 3 hours prior to presentation         -------------------------------------------------- RESULTS -------------------------------------------------  I have personally reviewed all laboratory and imaging results for this patient. Results are listed below.      LABS:  Results for orders placed or performed during the hospital encounter of 01/23/22   CBC auto differential   Result Value Ref Range    WBC 6.3 4.5 - 11.5 E9/L    RBC 4.09 3.50 - 5.50 E12/L    Hemoglobin 13.3 11.5 - 15.5 g/dL    Hematocrit 37.6 34.0 - 48.0 %    MCV 91.9 80.0 - 99.9 fL    MCH 32.5 26.0 - 35.0 pg    MCHC 35.4 (H) 32.0 - 34.5 %    RDW 11.9 11.5 - 15.0 fL    Platelets 984 815 - 730 E9/L    MPV 10.2 7.0 - 12.0 fL    Neutrophils % 61.9 43.0 - 80.0 %    Immature Granulocytes % 0.2 0.0 - 5.0 %    Lymphocytes % 26.1 20.0 - 42.0 %    Monocytes % 8.8 2.0 - 12.0 %    Eosinophils % 2.5 0.0 - 6.0 %    Basophils % 0.5 0.0 - 2.0 %    Neutrophils Absolute 3.92 1.80 - 7.30 E9/L    Immature Granulocytes # 0.01 E9/L    Lymphocytes Absolute 1.65 1.50 - 4.00 E9/L    Monocytes Absolute 0.56 0.10 - 0.95 E9/L    Eosinophils Absolute 0.16 0.05 - 0.50 E9/L    Basophils Absolute 0.03 0.00 - 0.20 K3/F   Basic metabolic panel Result Value Ref Range    Sodium 140 132 - 146 mmol/L    Potassium 3.8 3.5 - 5.0 mmol/L    Chloride 106 98 - 107 mmol/L    CO2 25 22 - 29 mmol/L    Anion Gap 9 7 - 16 mmol/L    Glucose 102 (H) 74 - 99 mg/dL    BUN 5 (L) 6 - 20 mg/dL    CREATININE 0.8 0.5 - 1.0 mg/dL    GFR Non-African American >60 >=60 mL/min/1.73    GFR African American >60     Calcium 9.0 8.6 - 10.2 mg/dL   Urinalysis with Microscopic   Result Value Ref Range    Color, UA Yellow Straw/Yellow    Clarity, UA Clear Clear    Glucose, Ur Negative Negative mg/dL    Bilirubin Urine Negative Negative    Ketones, Urine Negative Negative mg/dL    Specific Gravity, UA <=1.005 1.005 - 1.030    Blood, Urine TRACE-LYSED Negative    pH, UA 6.0 5.0 - 9.0    Protein, UA Negative Negative mg/dL    Urobilinogen, Urine 0.2 <2.0 E.U./dL    Nitrite, Urine Negative Negative    Leukocyte Esterase, Urine Negative Negative    WBC, UA 0-1 0 - 5 /HPF    RBC, UA 0-1 0 - 2 /HPF    Epithelial Cells, UA RARE /HPF    Bacteria, UA NONE SEEN None Seen /HPF   Pregnancy, Urine   Result Value Ref Range    HCG(Urine) Pregnancy Test NEGATIVE NEGATIVE   APTT   Result Value Ref Range    aPTT 31.9 24.5 - 35.1 sec   Protime-INR   Result Value Ref Range    Protime 11.9 9.3 - 12.4 sec    INR 1.1    Troponin   Result Value Ref Range    Troponin, High Sensitivity <6 0 - 9 ng/L   EKG 12 Lead   Result Value Ref Range    Ventricular Rate 72 BPM    Atrial Rate 72 BPM    P-R Interval 124 ms    QRS Duration 92 ms    Q-T Interval 402 ms    QTc Calculation (Bazett) 440 ms    P Axis 49 degrees    R Axis 64 degrees    T Axis 36 degrees       RADIOLOGY:  Interpreted by Radiologist.  XR CHEST PORTABLE   Final Result   No acute process. CTA HEAD W CONTRAST   Final Result   1. No acute intracranial abnormality. 2. Unremarkable CTA of the head.                EKG Interpretation  Time: 09:39 AM EST  Rhythm: normal sinus   Rate: 72  Axis: normal  Conduction: normal  ST Segments: no acute change  T Waves: non specific changes  Clinical Impression: non-specific EKG  Comparison to prior EKG: no previous EKG      ------------------------- NURSING NOTES AND VITALS REVIEWED ---------------------------  The nursing notes within the ED encounter and vital signs as below have been reviewed by myself. BP (!) 113/56   Pulse 68   Temp 98.2 °F (36.8 °C) (Oral)   Resp 16   SpO2 99%   Oxygen Saturation Interpretation: Normal      The patients available past medical records and past encounters were reviewed. ------------------------------ ED COURSE/MEDICAL DECISION MAKING----------------------  Medications   0.9 % sodium chloride bolus (has no administration in time range)   diazePAM (VALIUM) tablet 5 mg (has no administration in time range)   meclizine (ANTIVERT) tablet 25 mg (25 mg Oral Given 1/23/22 1149)   ondansetron (ZOFRAN) injection 4 mg (4 mg IntraVENous Given 1/23/22 1149)   iopamidol (ISOVUE-370) 76 % injection 75 mL (75 mLs IntraVENous Given 1/23/22 1128)           Procedures:   none      Medical Decision Making:    still dizzy after meclizine, zofran and valium. Appears more like a central dizzy than a peripheral dizzy and has no nausea, vomiting and does not get worse with position changes or head movement. Has vertical nystagmus on physical exam today. NIHSS zero. CTA head negative. BP low-normal. Agrees to neuro consult and MRI and observation today. Concern for vertebral-baslilar or midbrain CVA. Orthostatics negative      This patient's ED course included: re-evaluation prior to disposition, IV medications, cardiac monitoring, continuous pulse oximetry and a personal history and physicial eaxmination    This patient has remained hemodynamically stable, remained unchanged and been closely monitored during their ED course. Re-Evaluations:  Time: 1330   Re-evaluation.   Patients symptoms show no change  Repeat physical examination is not changed      Consultations:   13:06 PM EST  Spoke with Dr Calista Tamayo, discussed case, accepts admission for MRI brain and neurology consultation    Critical Care: none    I, Freddie Morrow, DO, am the Primary Provider of Record    Counseling: The emergency provider has spoken with the patient and spouse/SO and discussed todays results, in addition to providing specific details for the plan of care and counseling regarding the diagnosis and prognosis. Questions are answered at this time and they are agreeable with the plan.    --------------------------- IMPRESSION AND DISPOSITION ---------------------------------    IMPRESSION  1. Stroke-like symptoms    2.  Dizziness        DISPOSITION  Disposition: Admit to [facility]   Patient condition is stable             [facility], DO  01/29/22 ran[facility]eterikaCar[facility], DO  01/29/22 2050

## 2022-01-24 ENCOUNTER — APPOINTMENT (OUTPATIENT)
Dept: MRI IMAGING | Age: 47
DRG: 149 | End: 2022-01-24
Payer: COMMERCIAL

## 2022-01-24 VITALS
DIASTOLIC BLOOD PRESSURE: 54 MMHG | WEIGHT: 166 LBS | SYSTOLIC BLOOD PRESSURE: 107 MMHG | TEMPERATURE: 97.8 F | OXYGEN SATURATION: 96 % | RESPIRATION RATE: 18 BRPM | BODY MASS INDEX: 27.66 KG/M2 | HEIGHT: 65 IN | HEART RATE: 62 BPM

## 2022-01-24 LAB
ANION GAP SERPL CALCULATED.3IONS-SCNC: 6 MMOL/L (ref 7–16)
BASOPHILS ABSOLUTE: 0.02 E9/L (ref 0–0.2)
BASOPHILS RELATIVE PERCENT: 0.4 % (ref 0–2)
BUN BLDV-MCNC: 6 MG/DL (ref 6–20)
CALCIUM SERPL-MCNC: 8.6 MG/DL (ref 8.6–10.2)
CHLORIDE BLD-SCNC: 108 MMOL/L (ref 98–107)
CHOLESTEROL, TOTAL: 126 MG/DL (ref 0–199)
CO2: 26 MMOL/L (ref 22–29)
CREAT SERPL-MCNC: 1 MG/DL (ref 0.5–1)
EKG ATRIAL RATE: 72 BPM
EKG P AXIS: 49 DEGREES
EKG P-R INTERVAL: 124 MS
EKG Q-T INTERVAL: 402 MS
EKG QRS DURATION: 92 MS
EKG QTC CALCULATION (BAZETT): 440 MS
EKG R AXIS: 64 DEGREES
EKG T AXIS: 36 DEGREES
EKG VENTRICULAR RATE: 72 BPM
EOSINOPHILS ABSOLUTE: 0.13 E9/L (ref 0.05–0.5)
EOSINOPHILS RELATIVE PERCENT: 2.6 % (ref 0–6)
FOLATE: 17.6 NG/ML (ref 4.8–24.2)
GFR AFRICAN AMERICAN: >60
GFR NON-AFRICAN AMERICAN: >60 ML/MIN/1.73
GLUCOSE BLD-MCNC: 90 MG/DL (ref 74–99)
HCT VFR BLD CALC: 33.5 % (ref 34–48)
HDLC SERPL-MCNC: 47 MG/DL
HEMOGLOBIN: 11.7 G/DL (ref 11.5–15.5)
IMMATURE GRANULOCYTES #: 0.02 E9/L
IMMATURE GRANULOCYTES %: 0.4 % (ref 0–5)
LDL CHOLESTEROL CALCULATED: 63 MG/DL (ref 0–99)
LYMPHOCYTES ABSOLUTE: 2.36 E9/L (ref 1.5–4)
LYMPHOCYTES RELATIVE PERCENT: 47.7 % (ref 20–42)
MCH RBC QN AUTO: 32.7 PG (ref 26–35)
MCHC RBC AUTO-ENTMCNC: 34.9 % (ref 32–34.5)
MCV RBC AUTO: 93.6 FL (ref 80–99.9)
MONOCYTES ABSOLUTE: 0.5 E9/L (ref 0.1–0.95)
MONOCYTES RELATIVE PERCENT: 10.1 % (ref 2–12)
NEUTROPHILS ABSOLUTE: 1.92 E9/L (ref 1.8–7.3)
NEUTROPHILS RELATIVE PERCENT: 38.8 % (ref 43–80)
PDW BLD-RTO: 12 FL (ref 11.5–15)
PLATELET # BLD: 200 E9/L (ref 130–450)
PMV BLD AUTO: 10.5 FL (ref 7–12)
POTASSIUM REFLEX MAGNESIUM: 4 MMOL/L (ref 3.5–5)
RBC # BLD: 3.58 E12/L (ref 3.5–5.5)
SODIUM BLD-SCNC: 140 MMOL/L (ref 132–146)
TRIGL SERPL-MCNC: 82 MG/DL (ref 0–149)
TSH SERPL DL<=0.05 MIU/L-ACNC: 0.9 UIU/ML (ref 0.27–4.2)
VITAMIN B-12: 359 PG/ML (ref 211–946)
VLDLC SERPL CALC-MCNC: 16 MG/DL
WBC # BLD: 5 E9/L (ref 4.5–11.5)

## 2022-01-24 PROCEDURE — 80048 BASIC METABOLIC PNL TOTAL CA: CPT

## 2022-01-24 PROCEDURE — 36415 COLL VENOUS BLD VENIPUNCTURE: CPT

## 2022-01-24 PROCEDURE — 99222 1ST HOSP IP/OBS MODERATE 55: CPT | Performed by: OTOLARYNGOLOGY

## 2022-01-24 PROCEDURE — 6370000000 HC RX 637 (ALT 250 FOR IP): Performed by: PSYCHIATRY & NEUROLOGY

## 2022-01-24 PROCEDURE — 85025 COMPLETE CBC W/AUTO DIFF WBC: CPT

## 2022-01-24 PROCEDURE — 70551 MRI BRAIN STEM W/O DYE: CPT

## 2022-01-24 PROCEDURE — 93010 ELECTROCARDIOGRAM REPORT: CPT | Performed by: INTERNAL MEDICINE

## 2022-01-24 PROCEDURE — 82607 VITAMIN B-12: CPT

## 2022-01-24 PROCEDURE — 2580000003 HC RX 258: Performed by: STUDENT IN AN ORGANIZED HEALTH CARE EDUCATION/TRAINING PROGRAM

## 2022-01-24 PROCEDURE — 6370000000 HC RX 637 (ALT 250 FOR IP): Performed by: STUDENT IN AN ORGANIZED HEALTH CARE EDUCATION/TRAINING PROGRAM

## 2022-01-24 PROCEDURE — 84443 ASSAY THYROID STIM HORMONE: CPT

## 2022-01-24 PROCEDURE — 82746 ASSAY OF FOLIC ACID SERUM: CPT

## 2022-01-24 PROCEDURE — 99222 1ST HOSP IP/OBS MODERATE 55: CPT | Performed by: PSYCHIATRY & NEUROLOGY

## 2022-01-24 PROCEDURE — 6360000002 HC RX W HCPCS: Performed by: STUDENT IN AN ORGANIZED HEALTH CARE EDUCATION/TRAINING PROGRAM

## 2022-01-24 PROCEDURE — 80061 LIPID PANEL: CPT

## 2022-01-24 RX ORDER — LANOLIN ALCOHOL/MO/W.PET/CERES
1000 CREAM (GRAM) TOPICAL DAILY
Status: DISCONTINUED | OUTPATIENT
Start: 2022-01-24 | End: 2022-01-24 | Stop reason: HOSPADM

## 2022-01-24 RX ADMIN — CYANOCOBALAMIN TAB 1000 MCG 1000 MCG: 1000 TAB at 09:50

## 2022-01-24 RX ADMIN — ASPIRIN 81 MG 81 MG: 81 TABLET ORAL at 09:41

## 2022-01-24 RX ADMIN — SODIUM CHLORIDE, PRESERVATIVE FREE 10 ML: 5 INJECTION INTRAVENOUS at 09:43

## 2022-01-24 RX ADMIN — Medication 1000 UNITS: at 09:42

## 2022-01-24 RX ADMIN — PANTOPRAZOLE SODIUM 40 MG: 40 TABLET, DELAYED RELEASE ORAL at 06:10

## 2022-01-24 RX ADMIN — OXYCODONE HYDROCHLORIDE AND ACETAMINOPHEN 1000 MG: 500 TABLET ORAL at 09:42

## 2022-01-24 RX ADMIN — ESCITALOPRAM 20 MG: 10 TABLET, FILM COATED ORAL at 09:42

## 2022-01-24 RX ADMIN — ENOXAPARIN SODIUM 40 MG: 100 INJECTION SUBCUTANEOUS at 09:30

## 2022-01-24 ASSESSMENT — ENCOUNTER SYMPTOMS
VOMITING: 0
NAUSEA: 0
EYE REDNESS: 0
COUGH: 0
ALLERGIC/IMMUNOLOGIC NEGATIVE: 1
COLOR CHANGE: 0
SHORTNESS OF BREATH: 0
STRIDOR: 0
EYE DISCHARGE: 0

## 2022-01-24 NOTE — CONSULTS
OTOLARYNGOLOGY  CONSULT NOTE  1/24/2022    Physician Consulted: Dr. Jeana Baron  Reason for Consult: dizziness  Referring Physician: Dr. Kalee CASTLE  Martha Miller is a 42 y.o. female who ENT was consulted for evaluation of dizziness. Pt states that for the last two days she has had dizziness, lightheadedness, and room-spinning. It originally started when she was sitting at her computer working, she began to feel like her head \"was floating\" then when she stood up she had intense room-spinning that lasted a few seconds. The feeling of her head floating has lasted 12+ hours, relieved when she sleeps. She is not able to walk up stairs. When she does walk she feels that she is drifting to the left. She denies otalgia, otorrhea, aural fullness, tinnitus, or hearing loss. No previous HENT surgical history. Evaluated by neurology with positive left Jessica Button. States she is feeling a little better today since she has rested. Review of Systems   Constitutional: Negative for chills, fatigue and fever. Eyes: Negative for discharge and redness. Respiratory: Negative for cough, shortness of breath and stridor. Gastrointestinal: Negative for nausea and vomiting. Endocrine: Negative. Genitourinary: Negative. Musculoskeletal: Negative. Skin: Negative for color change and rash. Allergic/Immunologic: Negative. Neurological: Positive for dizziness. Negative for speech difficulty and headaches. Hematological: Negative. Psychiatric/Behavioral: Negative for agitation and confusion. All other systems reviewed and are negative.       Past Medical History:   Diagnosis Date    Depression     Endometriosis        Past Surgical History:   Procedure Laterality Date    APPENDECTOMY  2009    CHOLECYSTECTOMY  1994    HAND SURGERY Right 12/14/2020    RIGHT DORSAL WRIST GANGLION EXCISION performed by Vivian Amin MD at [facility] 178  [phone]    pain in abdomen    TONSILLECTOMY         Medications Prior to Admission:    Prior to Admission medications    Medication Sig Start Date End Date Taking? Authorizing Provider   pantoprazole (PROTONIX) 40 MG tablet Take 1 tablet by mouth every morning (before breakfast) 1/18/22  Yes ROYCE De La Cruz CNP   escitalopram (LEXAPRO) 20 MG tablet Take 1 tablet by mouth daily 9/2/21 3/1/22 Yes Driss Trent,    vitamin D 25 MCG (1000 UT) CAPS Take 1,000 Units by mouth daily   Yes Historical Provider, MD   ascorbic acid (VITAMIN C) 1000 MG tablet Take 1,000 mg by mouth daily   Yes Historical Provider, MD   Multiple Vitamin (MULTI-VITAMIN DAILY PO) Take by mouth daily   Yes Historical Provider, MD       Allergies   Allergen Reactions    Compazine [Prochlorperazine Maleate] Other (See Comments)     Patient states \"I go catatonic\"    Codeine Nausea And Vomiting       Family History   Problem Relation Age of Onset    Other Mother         hocm, sarcoid, fibromyalgia    Heart Disease Father        Social History     Tobacco Use    Smoking status: Never Smoker    Smokeless tobacco: Never Used   Vaping Use    Vaping Use: Never used   Substance Use Topics    Alcohol use: No    Drug use: No           PHYSICAL EXAM:    Vitals:    01/24/22 0333   BP: 108/60   Pulse: 62   Resp: 16   Temp: 97.9 °F (36.6 °C)   SpO2: 96%       General Appearance:  Laying in bed, awake, alert, no apparent distress  Head/face:  NC/AT  Eyes: PERRL, EOMI  ENT: Bilateral external ears WNL, Nares patent, Septum midline,   Neck:Supple, no adenopathy  Lungs:  Non-labored, good respiratory effort, no stridor  Heart:  RR  Neuro: Facial nerve symmetric and intact. House Brackmann 1/6, bilaterally. LABS:  CBC  Recent Labs     01/24/22  0559   WBC 5.0   HGB 11.7   HCT 33.5*          RADIOLOGY  XR CHEST PORTABLE    Result Date: 1/23/2022  EXAMINATION: ONE XRAY VIEW OF THE CHEST 1/23/2022 11:31 am COMPARISON: None.  HISTORY: ORDERING SYSTEM PROVIDED HISTORY: Possible Stroke TECHNOLOGIST PROVIDED HISTORY: Reason for exam:->Possible Stroke FINDINGS: The lungs are without acute focal process. There is no effusion or pneumothorax. The cardiomediastinal silhouette is without acute process. The osseous structures are without acute process. No acute process. CTA HEAD W CONTRAST    Result Date: 1/23/2022  EXAMINATION: CTA OF THE HEAD WITH CONTRAST 1/23/2022 11:29 am TECHNIQUE: CTA of the head/brain was performed with the administration of intravenous contrast. Multiplanar reformatted images are provided for review. MIP images are provided for review. Dose modulation, iterative reconstruction, and/or weight based adjustment of the mA/kV was utilized to reduce the radiation dose to as low as reasonably achievable. COMPARISON: None HISTORY: ORDERING SYSTEM PROVIDED HISTORY: dizzy, vertical nystagmus: rule out DVT TECHNOLOGIST PROVIDED HISTORY: Reason for exam:->dizzy, vertical nystagmus: rule out DVT Has a \"code stroke\" or \"stroke alert\" been called? ->No Decision Support Exception - unselect if not a suspected or confirmed emergency medical condition->Emergency Medical Condition (MA) FINDINGS: CT HEAD: BRAIN/VENTRICLES:  No acute intracranial hemorrhage or extraaxial fluid collection. Grey-white differentiation is maintained. No evidence of mass, mass effect or midline shift. No evidence of hydrocephalus. ORBITS: The visualized portion of the orbits demonstrate no acute abnormality. SINUSES:  The visualized paranasal sinuses and mastoid air cells demonstrate no acute abnormality. SOFT TISSUES/SKULL: No acute abnormality of the visualized skull or soft tissues. CTA HEAD: ANTERIOR CIRCULATION: No significant stenosis of the intracranial internal carotid, anterior cerebral, or middle cerebral arteries. No aneurysm. POSTERIOR CIRCULATION: No significant stenosis of the vertebral, basilar, or posterior cerebral arteries. No aneurysm. The left vertebral artery is dominant. OTHER: No dural venous sinus thrombosis on this non-dedicated study. There is no evidence of vascular malformation. 1. No acute intracranial abnormality. 2. Unremarkable CTA of the head.          ASSESSMENT:  42 y.o. female with dizziness, possibly vestibular neuritis with a component of left BPPV     PLAN:  · Recommend supportive care- hydration, rest, anti emetic as needed   · Recommend outpatient vestibular therapy   · Follow up with Dr. Teo Truong NP as outpatient   · Seen, examined, discussed with Dr. Henriette Bence     Electronically signed by Hiwot Baer DO on 1/24/22 at 6:46 AM EST on

## 2022-01-24 NOTE — PROGRESS NOTES
Hospitalist Discharge Summary    Patient ID: Tracy Russell   Patient : 1975  Patient's PCP: Homero Lemons DO    Admit Date: 2022   Admitting Physician: Siobhan Gutierrez MD    Discharge Date:  2022   Discharge Physician: Nubia Irving MD   Discharge Condition: Stable  Discharge Disposition: [facility] course in brief:  (Please refer to daily progress notes for a comprehensive review of the hospitalization by requesting medical records)  Patient comes in for complaints of numbness, tingling which started yesterday evening and continued to persist.  Patient had been watching her monitor for prolonged duration and did notice that she has been having some severe headache, tingling which she noticed first in the left hand and then into the right arm. She started to stand up and noticed that she was swaying to the left. Patient did not notice any blurry vision associated, loss of consciousness either. Diarrhea, constipation, urination problem associated with it.    -admitted on telemetry  -CTA of the head and neck reviewed-showed no acute findings  -MRI of the brain ordered-reviewed Multifocal punctate foci of subtle increased T2/FLAIR signal within the bifrontal cerebral white matter. Diazepam and/or antiemetics (compazine or phenergan) PRN for severe symptoms only, avoid using more than 2-3 days per week or for longer than 2 weeks from onset of symptoms due to prolongation of symptoms.  But patient has a severe allergy to compazine which includes catatonia hence not prescribed  -Orthostatic vitals ordered during admission  -No previous risk factors for TIA/stroke  - Neurology consulted-appreciate recommendations-left sided benign paroxysmal positional vertigo-vestibular rehab outpt  - ENT consulted-outpt vestibular rehab  - Echo outpatient  -Restratification labs ordered- wnl  -A1c reviewed from 2021-9.  -Patient currently has myopic corrective lenses and has last seen her ophthalmologist back in April with no changes in prescription. Currently does not complain of any blurry visions, vision abnormalities. Consults:   IP CONSULT TO HOSPITALIST  IP CONSULT TO NEUROLOGY  IP CONSULT TO OTOLARYNGOLOGY    Discharge Diagnoses:  left sided benign paroxysmal positional vertigo  Major depression      Discharge Instructions / Follow up:    Future Appointments   Date Time Provider Hayley Marcos   1/25/2022  8:15 AM DO Ghada Oconnor [facility]   3/8/2022  8:00 AM DO Ghada Oconnor [facility] [place]   12/8/2022  7:30 AM MD JETT Hitchcock       The patient's condition is stable. At this time the patient is without objective evidence of an acute process requiring continuing hospitalization or inpatient management. They are stable for discharge with outpatient follow-up. I have spoken with the patient and discussed the results of the current hospitalization, in addition to providing specific details for the plan of care and counseling regarding the diagnosis and prognosis. The plan has been discussed in detail and they are aware of the specific conditions for emergent return, as well as the importance of follow-up. Their questions are answered at this time and they are agreeable with the plan for discharge to home. Continued appropriate risk factor modification of blood pressure, diabetes and serum lipids will remain essential to reducing risk of future atherosclerotic development    Activity: activity as tolerated    Physical exam:  General appearance: No apparent distress, appears stated age and cooperative. HEENT: Normal cephalic, atraumatic without obvious deformity. Pupils equal, round, and reactive to light. Extra ocular muscles intact. Conjunctivae/corneas clear. Neck: Supple, with full range of motion. No jugular venous distention. Trachea midline. Respiratory:  Clear to auscultation bilaterally.   No apparent distress. Cardiovascular:  Regular rate and rhythm. S1, S2 without murmurs, rubs, or gallops. PV: Brisk capillary refill. +2 pedal and radial pulses bilaterally. No clubbing, cyanosis, edema of bilateral lower extremities. Abdomen: Soft, non-tender, non-distended. +BS  Musculoskeletal: No obvious deformities or erythematous or edematous joints. Skin: Normal skin color. No rashes or lesions. Neurologic:  Neurovascularly intact without any focal sensory/motor deficits. Cranial nerves: II-XII intact, grossly non-focal.  Psychiatric: Alert and oriented, thought content appropriate, normal insight    Significant labs:  CBC:   Recent Labs     01/23/22  0950 01/24/22  0559   WBC 6.3 5.0   RBC 4.09 3.58   HGB 13.3 11.7   HCT 37.6 33.5*   MCV 91.9 93.6   RDW 11.9 12.0    200     BMP:   Recent Labs     01/23/22  0950 01/24/22  0559    140   K 3.8 4.0    108*   CO2 25 26   BUN 5* 6   CREATININE 0.8 1.0     LFT:  No results for input(s): PROT, ALB, ALKPHOS, ALT, AST, BILITOT, AMYLASE, LIPASE in the last 72 hours. PT/INR:   Recent Labs     01/23/22  1117   INR 1.1   APTT 31.9     BNP: No results for input(s): BNP in the last 72 hours. Hgb A1C:   Lab Results   Component Value Date    LABA1C 4.7 10/07/2021     Folate and B12:   Lab Results   Component Value Date    [id number] 554 01/24/2022   ,   Lab Results   Component Value Date    FOLATE 17.6 01/24/2022     Thyroid Studies:   Lab Results   Component Value Date    TSH 0.898 01/24/2022       Urinalysis:    Lab Results   Component Value Date    NITRU Negative 01/23/2022    WBCUA 0-1 01/23/2022    BACTERIA NONE SEEN 01/23/2022    RBCUA 0-1 01/23/2022    BLOODU TRACE-LYSED 01/23/2022    SPECGRAV <=1.005 01/23/2022    GLUCOSEU Negative 01/23/2022       Imaging:  XR CHEST PORTABLE    Result Date: 1/23/2022  EXAMINATION: ONE XRAY VIEW OF THE CHEST 1/23/2022 11:31 am COMPARISON: None.  HISTORY: ORDERING SYSTEM PROVIDED HISTORY: Possible Stroke TECHNOLOGIST PROVIDED HISTORY: Reason for exam:->Possible Stroke FINDINGS: The lungs are without acute focal process. There is no effusion or pneumothorax. The cardiomediastinal silhouette is without acute process. The osseous structures are without acute process. No acute process. CTA HEAD W CONTRAST    Result Date: 1/23/2022  EXAMINATION: CTA OF THE HEAD WITH CONTRAST 1/23/2022 11:29 am TECHNIQUE: CTA of the head/brain was performed with the administration of intravenous contrast. Multiplanar reformatted images are provided for review. MIP images are provided for review. Dose modulation, iterative reconstruction, and/or weight based adjustment of the mA/kV was utilized to reduce the radiation dose to as low as reasonably achievable. COMPARISON: None HISTORY: ORDERING SYSTEM PROVIDED HISTORY: dizzy, vertical nystagmus: rule out DVT TECHNOLOGIST PROVIDED HISTORY: Reason for exam:->dizzy, vertical nystagmus: rule out DVT Has a \"code stroke\" or \"stroke alert\" been called? ->No Decision Support Exception - unselect if not a suspected or confirmed emergency medical condition->Emergency Medical Condition (MA) FINDINGS: CT HEAD: BRAIN/VENTRICLES:  No acute intracranial hemorrhage or extraaxial fluid collection. Grey-white differentiation is maintained. No evidence of mass, mass effect or midline shift. No evidence of hydrocephalus. ORBITS: The visualized portion of the orbits demonstrate no acute abnormality. SINUSES:  The visualized paranasal sinuses and mastoid air cells demonstrate no acute abnormality. SOFT TISSUES/SKULL: No acute abnormality of the visualized skull or soft tissues. CTA HEAD: ANTERIOR CIRCULATION: No significant stenosis of the intracranial internal carotid, anterior cerebral, or middle cerebral arteries. No aneurysm. POSTERIOR CIRCULATION: No significant stenosis of the vertebral, basilar, or posterior cerebral arteries. No aneurysm. The left vertebral artery is dominant.  OTHER: No dural venous sinus thrombosis on this non-dedicated study. There is no evidence of vascular malformation. 1. No acute intracranial abnormality. 2. Unremarkable CTA of the head. MRI BRAIN WO CONTRAST    Result Date: 1/24/2022  EXAMINATION: MRI OF THE BRAIN WITHOUT CONTRAST  1/24/2022 7:01 am TECHNIQUE: Multiplanar multisequence MRI of the brain was performed without the administration of intravenous contrast. COMPARISON: CT angiogram head with contrast January 23, 2022 HISTORY: ORDERING SYSTEM PROVIDED HISTORY: TIA vs stroke TECHNOLOGIST PROVIDED HISTORY: Reason for exam:->TIA vs stroke What reading provider will be dictating this exam?->CRC FINDINGS: INTRACRANIAL STRUCTURES/VENTRICLES: There is no acute infarct. No mass effect or midline shift. No evidence of an acute intracranial hemorrhage. The ventricles and sulci are normal in size and configuration. The sellar/suprasellar regions appear unremarkable. The normal signal voids within the major intracranial vessels appear maintained. Scattered bifrontal white matter punctate subtle increased foci of increased T2/FLAIR signal are identified. ORBITS: The visualized portion of the orbits demonstrate no acute abnormality. SINUSES: The visualized paranasal sinuses and mastoid air cells demonstrate no acute abnormality. BONES/SOFT TISSUES: The bone marrow signal intensity appears normal. The soft tissues demonstrate no acute abnormality. No evidence of acute ischemia. Multifocal punctate foci of subtle increased T2/FLAIR signal within the bifrontal cerebral white matter. Differential diagnostic considerations include association with minimal microvascular ischemic disease, migraine headaches, posttraumatic sequela, vasculitis/inflammatory disease.        Discharge Medications:      Medication List      CONTINUE taking these medications    ascorbic acid 1000 MG tablet  Commonly known as: VITAMIN C     escitalopram 20 MG tablet  Commonly known as: Lexapro  Take 1 tablet by mouth daily     MULTI-VITAMIN DAILY PO     pantoprazole 40 MG tablet  Commonly known as: PROTONIX  Take 1 tablet by mouth every morning (before breakfast)     vitamin D 25 MCG (1000 UT) Caps            Time Spent on discharge is more than 45 minutes in the examination, evaluation, counseling and review of medications and discharge plan.    +++++++++++++++++++++++++++++++++++++++++++++++++  Polina Barrett MD  [address]  +++++++++++++++++++++++++++++++++++++++++++++++++  NOTE: This report was transcribed using voice recognition software. Every effort was made to ensure accuracy; however, inadvertent computerized transcription errors may be present.

## 2022-01-24 NOTE — PROGRESS NOTES
Messaged Dr Mendel Soda, DO with ENT to inquire on seeing patient again today. Awaiting possible clearence to discharge.

## 2022-01-25 ENCOUNTER — CARE COORDINATION (OUTPATIENT)
Dept: OTHER | Facility: CLINIC | Age: 47
End: 2022-01-25

## 2022-01-25 ENCOUNTER — OFFICE VISIT (OUTPATIENT)
Dept: FAMILY MEDICINE CLINIC | Age: 47
End: 2022-01-25
Payer: COMMERCIAL

## 2022-01-25 VITALS
TEMPERATURE: 98.1 F | HEIGHT: 65 IN | BODY MASS INDEX: 28.14 KG/M2 | SYSTOLIC BLOOD PRESSURE: 99 MMHG | RESPIRATION RATE: 20 BRPM | OXYGEN SATURATION: 100 % | WEIGHT: 168.9 LBS | HEART RATE: 66 BPM | DIASTOLIC BLOOD PRESSURE: 59 MMHG

## 2022-01-25 DIAGNOSIS — R10.13 EPIGASTRIC PAIN: ICD-10-CM

## 2022-01-25 DIAGNOSIS — R93.89 ABNORMAL MRI: ICD-10-CM

## 2022-01-25 DIAGNOSIS — R42 DIZZINESS: Primary | ICD-10-CM

## 2022-01-25 PROBLEM — R29.90 STROKE-LIKE SYMPTOMS: Status: RESOLVED | Noted: 2022-01-23 | Resolved: 2022-01-25

## 2022-01-25 PROCEDURE — 99214 OFFICE O/P EST MOD 30 MIN: CPT | Performed by: STUDENT IN AN ORGANIZED HEALTH CARE EDUCATION/TRAINING PROGRAM

## 2022-01-25 NOTE — PROGRESS NOTES
Patient:  Riley Akhtar 42 y.o. female     Date of Service: 1/25/22      Chiefcomplaint:   Chief Complaint   Patient presents with    ED Follow-up    Dizziness    Abdominal Pain     History of Present Illness   Dizziness - Saturday night  Positional didn't matter  Went to sleep. Still felt it in middle of the night when had to get up  CT scan and MRI negative  [place] - juli hallpike positive on left. consideratino of otolith. ENT looked in ears but no  bnormalities  Still having symptoms. Head floating on balloon string now. Vestibular neuritis? Lasts a while, fall towards left. No auditory symptoms. Had some digestive stuff prior to this. Having sore throat. Review notes from neurology (bppv maybe), bmp, cbc, thyroid labs (wnl)    Gi sx - stomach upset, bloating, gas, occasional gas pains. No bleeding. No significnat change in stool. No egd history. On ppi. Stress of 's illness    Pertinent Medical, Family, Surgical, Social History:  Past Medical History:   Diagnosis Date    Depression     Endometriosis      Physical Exam   Vitals: BP (!) 99/59 (Site: Right Upper Arm, Position: Sitting, Cuff Size: Large Adult)   Pulse 66   Temp 98.1 °F (36.7 °C) (Temporal)   Resp 20   Ht 5' 5\" (1.651 m)   Wt 168 lb 14.4 oz (76.6 kg)   SpO2 100%   BMI 28.11 kg/m²   General Appearance: Alert, oriented, no acute distress  HEENT: No scleral icterus. No visible discharge from eyes  Neck: Not rigid. No visible masses  Chest wall/Lung: Clear to auscultation bilaterally,  respirations unlabored. No ronchi/wheezing/rales  Heart: RRR, no murmur  Abdomen: Soft, nontender  Extremities:  No edema  Skin: No rashes. No jaundice  Neuro: Alert and oriented. Reluctant to move head quickly. No unilateral numbness or weakness. David Case Psych: Appropriate mood and appropriate affect    Assessment and Plan   1. Dizziness  Vestibular neuritis versus BPPV or migraine. Will see ENT in coming weeks.   Neuroimaging negative for ischemic change. Maybe t2 signal abnormality. Continue vestibular exercises. Consider neuro fu if no other etiology. 2. Epigastric pain  Need scope from GI after resolution of other dizziness symptoms. Gas-x and ppi. -     Rivas Loving MD, Gastroenterology, [place]      No follow-ups on file. Saira Mcfarlane,      This document may have been prepared at least partially through the use of voice recognition software. Although effort is taken to assure the accuracy of this document, it is possible that grammatical, syntax,  or spelling errors may occur.

## 2022-01-25 NOTE — CARE COORDINATION
[facility] Initial Follow Up Call    Call within 2 business days of discharge: Yes    Patient: Renee Sampson Patient : 1975   MRN: [id number]  Reason for Admission: dizziness  Discharge Date: 22 RARS: Readmission Risk Score: 4.4 ( )      Last Discharge [address]       Complaint Diagnosis Description Type Department Provider    22 Dizziness Stroke-like symptoms . .. ED to Hosp-Admission (Discharged) (ADMITTED) Aster Talavera MD; Latisha Shannon. .. Spoke with: Patient    Transitions of Care Initial Call    Was this an external facility discharge? No Discharge Facility: [address] [place] [address] to be reviewed by the provider   Additional needs identified to be addressed with provider: No  none             Method of communication with provider : none      Advance Care Planning:   Does patient have an Advance Directive: not on file. Was this a readmission? No  Patient stated reason for admission: dizziness  Patients top risk factors for readmission: depression    Care Transition Nurse (CTN) contacted the patient by telephone to perform post hospital discharge assessment. Verified name and  with patient as identifiers. Provided introduction to self, and explanation of the CTN role. CTN reviewed discharge instructions, medical action plan and red flags with patient who verbalized understanding. Patient given an opportunity to ask questions and does not have any further questions or concerns at this time. Were discharge instructions available to patient? Yes. Reviewed appropriate site of care based on symptoms and resources available to patient including: PCP, Specialist, Benefits related nurse triage line, Urgent care clinics, When to call 911 and [address]. The patient agrees to contact the PCP office for questions related to their healthcare.      Medication reconciliation was performed with patient, who verbalizes understanding of administration of home medications. Advised obtaining a 90-day supply of all daily and as-needed medications. Pt states neurologist recommended that she take Vitamin D and Vitamin B-12 to help with the dizziness; pt states she will be getting these otc meds. Pt states she continues to feel dizzy and and a slight headache, but thinks the headache is due to not eating yet today and has taken meds this morning. Pt did have pcp office visit this morning and then  took her to grocery store; states she needed to walk slowly to prevent getting too dizzy. Pt reports her BP is low and pcp recommended she get BP monitor for home use. ACM educated pt that [address] could be used for this. Pt reports she contacted Dr. Dodie Peck otolaryngology office this morning and left message to schedule f/u appt; ACM offered to contact office and pt stated she will follow up. ACM educated pt on red flags and appropriate sites of care, including pcp, specialist, nurse access line, my chart messages, when to go to urgent care and ED and when to contact 911 for life threatening emergencies. Pt denied further questions, concerns or needs and is agreeable to a follow up call. Covid Risk Education     Educated patient about risk for severe COVID-19 due to risk factors according to CDC guidelines. ACM reviewed discharge instructions, medical action plan and red flag symptoms with the patient who verbalized understanding. Discussed COVID vaccination status: No. Education provided on COVID-19 vaccination as appropriate. Discussed exposure protocols and quarantine with CDC Guidelines. Patient was given an opportunity to verbalize any questions and concerns and agrees to contact ACM or health care provider for questions related to their healthcare. Reviewed and educated patient on any new and changed medications related to discharge diagnosis. Was patient discharged with a pulse oximeter?  No       ACM provided contact information. Plan for follow-up call in 5-7 days based on severity of symptoms and risk factors. Plan for next call: symptom management-dizziness, headaches  follow up appointment-appt made with otolaryngology?  GI?  medication management-any changes in meds          Non-face-to-face services provided:  Scheduled appointment with PCP-pt has appt today with PCP and f/u in one month  Scheduled appointment with Grover Keys states she left message with otolaryngology to schedule appt  Obtained and reviewed discharge summary and/or continuity of care documents  Education of patient/family/caregiver/guardian to support self-management-educated pt on red flags and appropriate sites of care, including pcp, specialist, nurse access line, urgent care, ED and when to call 911 for life threatening emergencies and pt verbalized understanding  Assessment and support for treatment adherence and medication management-reviewed meds with pt and pt denies any questions    Care Transitions 24 Hour Call    Schedule Follow Up Appointment with PCP: Declined  Do you have any ongoing symptoms?: Yes  Patient-reported symptoms:  (Comment: dizziness, slight headache)  Interventions for patient-reported symptoms:  (Comment: PCP appt today)  Do you have a copy of your discharge instructions?: Yes  Do you have all of your prescriptions and are they filled?: Yes  [facility] you [facility] a [facility]?: No  Have you scheduled your follow up appointment?: Yes  How are you going to get to your appointment?: Car - family or friend to transport  Were you discharged with any Home Care or Post Acute Services: No  Do you have support at home?: Partner/Spouse/SO  Do you feel like you have everything you need to keep you well at home?: Yes  Care Transitions Interventions     Other Services: Completed (Comment: Educated pt on discharge instructions, red flags and appropriate sites of care)        Goals      Conditions and Symptoms      I will schedule office visits, as directed by my provider. I will keep my appointment or reschedule if I have to cancel. I will notify my provider of any barriers to my plan of care. I will notify my provider of any symptoms that indicate a worsening of my condition. Barriers: lack of education  Plan for overcoming my barriers: Work with ACM  Confidence: 8/10  Anticipated Goal Completion Date: 2/25/22 1/25/22- Pt had f/u appt with PCP today; had called Dr Macrina Corrales office for f/u appt, left message              Follow Up  Future Appointments   Date Time Provider Hayley Marcos   2/25/2022 10:00 AM DO Freddy Mikecullen [facility]   12/8/2022  7:30 AM Holden Cruz MD [facility] [address] [place] [address] Garret MSN, RN   Ambulatory Care Manager  Associate Care Management  Cell [phone]  [email or web address]

## 2022-01-27 ENCOUNTER — OFFICE VISIT (OUTPATIENT)
Dept: GASTROENTEROLOGY | Age: 47
End: 2022-01-27
Payer: COMMERCIAL

## 2022-01-27 ENCOUNTER — TELEPHONE (OUTPATIENT)
Dept: GASTROENTEROLOGY | Age: 47
End: 2022-01-27

## 2022-01-27 VITALS
SYSTOLIC BLOOD PRESSURE: 117 MMHG | BODY MASS INDEX: 27.99 KG/M2 | WEIGHT: 168 LBS | HEART RATE: 71 BPM | HEIGHT: 65 IN | DIASTOLIC BLOOD PRESSURE: 57 MMHG

## 2022-01-27 DIAGNOSIS — K59.00 CONSTIPATION, UNSPECIFIED CONSTIPATION TYPE: ICD-10-CM

## 2022-01-27 DIAGNOSIS — R11.0 NAUSEA: Primary | ICD-10-CM

## 2022-01-27 PROCEDURE — 99202 OFFICE O/P NEW SF 15 MIN: CPT | Performed by: NURSE PRACTITIONER

## 2022-01-27 RX ORDER — PANTOPRAZOLE SODIUM 40 MG/1
40 TABLET, DELAYED RELEASE ORAL
Qty: 60 TABLET | Refills: 3 | Status: SHIPPED
Start: 2022-01-27 | End: 2022-02-07 | Stop reason: SDUPTHER

## 2022-01-27 RX ORDER — SODIUM, POTASSIUM,MAG SULFATES 17.5-3.13G
1 SOLUTION, RECONSTITUTED, ORAL ORAL ONCE
Qty: 1 EACH | Refills: 0 | Status: SHIPPED | OUTPATIENT
Start: 2022-01-27 | End: 2022-01-27

## 2022-01-27 NOTE — TELEPHONE ENCOUNTER
Prior Authorization Form:      DEMOGRAPHICS:                     Patient Name:  Kwame Bates  Patient :  1975            Insurance:  Payor: [facility] / Plan: Diane [address] / Product Type: *No Product type* /   Insurance ID Number:    Payor/Plan Subscr  Sex Relation Sub. Ins. ID Effective Group Num   1.  [address] 1975 Male Spouse [id number]                                    [id number]         DIAGNOSIS & PROCEDURE:                       Procedure/Operation: EGD/colon           CPT Code: 65729/28409    Diagnosis:  Nausea/constipation    ICD10 Code: r11.0/k59.0    Location:  [facility]    Surgeon:  Dr Cortney Real    SCHEDULING INFORMATION:                          Date: 22    Time: 230pm              Anesthesia:  MAC/TIVA                                                       Status:  Outpatient        Special Comments:         Electronically signed by Nelly Gross MA on 2022 at 12:31 PM

## 2022-01-27 NOTE — PROGRESS NOTES
Darryl Orozco (:  1975) is a 42 y.o. female, here for evaluation of the following chief complaint(s):  GI Problem (ref from marissa for epigastric pain)      SUBJECTIVE/OBJECTIVE:  HPI:    Ronnell Barragan is a very pleasant 42year old female that presents today with complaints of post prandial nausea and abdominal pain. There is cramping and gas. Has occurred in the middle of the night and is not food specific. Taking Pantoprazole 40mg daily x 1 week. Cannot tell me if it is working because she ended up in the ER with vertigo over the weekend. Admits to history of chronic NSAID use for headaches. There is increased stress. Not a smoker and no alcohol. Does drink soda and caffeine. Patient admits to occasional constipation. There is cramping associated. The constipation started with the nausea. Admits to a decreased appetite. Feels this is the cause of her constipation. Does not self medicate. Has lost about 5 lbs since this started. There was  One episode of vomiting. Antivert and Zofran did not control the dizziness in the ER. Has a follow up with ENT.      ROS:  General: Patient denies n/v/f/c or weight loss. HEENT: Patient denies persistent postnasal drip, scleral icterus, drooling, persistent bleeding from nose/mouth. Resp: Patient denies SOB, wheezing, productive cough. Cards: Patient denies CP, palpitations, significant edema  GI: As above. Derm: Patient denies jaundice/rashes. Musc: Patient denies diffuse/irregular joint swelling or myalgias.       Objective   Wt Readings from Last 3 Encounters:   22 168 lb (76.2 kg)   22 168 lb 14.4 oz (76.6 kg)   22 166 lb (75.3 kg)     Temp Readings from Last 3 Encounters:   22 98.1 °F (36.7 °C) (Temporal)   22 97.8 °F (36.6 °C) (Temporal)   22 97.8 °F (36.6 °C) (Temporal)     BP Readings from Last 3 Encounters:   22 (!) 117/57   22 (!) 99/59   22 (!) 107/54     Pulse Readings from Last 3 Encounters:   01/27/22 71   01/25/22 66   01/24/22 62        Physical Exam  Cardiovascular:      Heart sounds: Normal heart sounds. Pulmonary:      Breath sounds: Normal breath sounds. Abdominal:      Palpations: Abdomen is soft. Past Medical History:   Diagnosis Date    Depression     Endometriosis     Vertigo       Past Surgical History:   Procedure Laterality Date    APPENDECTOMY  2009    CHOLECYSTECTOMY  1994    HAND SURGERY Right 12/14/2020    RIGHT DORSAL WRIST GANGLION EXCISION performed by Oneal Mcgarry MD at [facility] 178  [phone]    pain in abdomen    TONSILLECTOMY      WISDOM TOOTH EXTRACTION        Family History   Problem Relation Age of Onset    Other Mother         hypertrophic cardiomyopathy, fibromyalgia, sarcoid    Heart Disease Father         Lab Results   Component Value Date    WBC 5.0 01/24/2022    HGB 11.7 01/24/2022    HCT 33.5 (L) 01/24/2022    MCV 93.6 01/24/2022     01/24/2022      Lab Results   Component Value Date     01/24/2022    K 4.0 01/24/2022     (H) 01/24/2022    CO2 26 01/24/2022    BUN 6 01/24/2022    CREATININE 1.0 01/24/2022    GLUCOSE 90 01/24/2022    CALCIUM 8.6 01/24/2022    PROT 6.4 10/07/2021    LABALBU 4.2 10/07/2021    BILITOT 0.4 10/07/2021    ALKPHOS 85 10/07/2021    AST 23 10/07/2021    ALT 24 10/07/2021    LABGLOM >60 01/24/2022    GFRAA >60 01/24/2022                       ASSESSMENT/PLAN:    1. Nausea    Occurs post prandial and there is cramping and gas associated. There is a history of chronic NSAID use. Will proceed with EGD to rule out gastropathy. Patient is agreeable. She is taking acetaminophen for headaches in place of Excedrin. Pantoprazole 40mg increased to twice daily. 2. Constipation, unspecified constipation type    Started with the nausea. Patient admits to a decreased appetite. Constipation is most likely due to poor diet.   Will proceed with colonoscopy to be thorough. Schedule colonoscopy and EGD under MAC anesthesia  Literature given. Periprocedural hydration advised. The risks and alternatives were explained as per the ASGE guidelines (I.  E.  Perforation, 3% chance of bleeding, reaction to medication, missed colon lesions,  infections, and death). Return for Follow up with Dr. Maxine Leyden post procedure. An electronic signature was used to authenticate this note.     --Zahra Denton, ROYCE - CNP

## 2022-02-01 ENCOUNTER — CARE COORDINATION (OUTPATIENT)
Dept: OTHER | Facility: CLINIC | Age: 47
End: 2022-02-01

## 2022-02-01 NOTE — CARE COORDINATION
[facility] Transitions Follow Up Call    2022    Patient: Zara Berman  Patient : 1975   MRN: [id number]  Reason for Admission: dizziness  Discharge Date: 22 RARS: Readmission Risk Score: 4.4 ( )    Spoke with: Noone    ACM attempted to reach patient for follow up call regarding inpatient discharge 22. HIPAA compliant message left requesting a return phone call at patients convenience. Will continue to follow. Care Transitions Subsequent and Final Call    Subsequent and Final Calls  Care Transitions Interventions  Other Interventions: Follow Up  Future Appointments   Date Time Provider Hayley Marcos   2/3/2022  9:00 AM KEYUR Benavides Audio [facility]   2/3/2022  9:15 AM ROYCE Hoskins CNP [facility]   2022 10:00 AM DO Freddy Zimmermancullen [facility]   2022 11:30 AM MD Natalya Wise [facility]   2022  7:30 AM Peyton Kendall MD Pending sale to [facility] JULIAN Combs MSN, RN   Ambulatory Care Manager  Associate Care Management  Cell [phone]  [email or web address]

## 2022-02-01 NOTE — DISCHARGE SUMMARY
Hospitalist Discharge Summary    Patient ID: Carey Victor   Patient : 1975  Patient's PCP: Cony London DO    Admit Date: 2022   Admitting Physician: Karuna De Leon MD    Discharge Date:  2022   Discharge Physician: Jarvis Deluca MD   Discharge Condition: Stable  Discharge Disposition: [facility] course in brief:  (Please refer to daily progress notes for a comprehensive review of the hospitalization by requesting medical records)  Patient comes in for complaints of numbness, tingling which started yesterday evening and continued to persist.  Patient had been watching her monitor for prolonged duration and did notice that she has been having some severe headache, tingling which she noticed first in the left hand and then into the right arm. She started to stand up and noticed that she was swaying to the left. Patient did not notice any blurry vision associated, loss of consciousness either. Diarrhea, constipation, urination problem associated with it.    -admitted on telemetry  -CTA of the head and neck reviewed-showed no acute findings  -MRI of the brain ordered-reviewed Multifocal punctate foci of subtle increased T2/FLAIR signal within the bifrontal cerebral white matter. Diazepam and/or antiemetics (compazine or phenergan) PRN for severe symptoms only, avoid using more than 2-3 days per week or for longer than 2 weeks from onset of symptoms due to prolongation of symptoms.  But patient has a severe allergy to compazine which includes catatonia hence not prescribed  -Orthostatic vitals ordered during admission  -No previous risk factors for TIA/stroke  - Neurology consulted-appreciate recommendations-left sided benign paroxysmal positional vertigo-vestibular rehab outpt  - ENT consulted-outpt vestibular rehab  - Echo outpatient  -Restratification labs ordered- wnl  -A1c reviewed from 2021-.  -Patient currently has myopic corrective lenses and has last seen her ophthalmologist back in April with no changes in prescription. Currently does not complain of any blurry visions, vision abnormalities. Consults:   IP CONSULT TO HOSPITALIST  IP CONSULT TO NEUROLOGY  IP CONSULT TO OTOLARYNGOLOGY    Discharge Diagnoses:  left sided benign paroxysmal positional vertigo  Major depression      Discharge Instructions / Follow up:    Future Appointments   Date Time Provider Hayley Marcos   1/25/2022  8:15 AM DO Ghada Martinez [facility]   3/8/2022  8:00 AM DO Eboni Martinezcullen [facility]   12/8/2022  7:30 AM Andre Rivas MD [facility] Angel GARDNER       The patient's condition is stable. At this time the patient is without objective evidence of an acute process requiring continuing hospitalization or inpatient management. They are stable for discharge with outpatient follow-up. I have spoken with the patient and discussed the results of the current hospitalization, in addition to providing specific details for the plan of care and counseling regarding the diagnosis and prognosis. The plan has been discussed in detail and they are aware of the specific conditions for emergent return, as well as the importance of follow-up. Their questions are answered at this time and they are agreeable with the plan for discharge to home. Continued appropriate risk factor modification of blood pressure, diabetes and serum lipids will remain essential to reducing risk of future atherosclerotic development    Activity: activity as tolerated    Physical exam:  General appearance: No apparent distress, appears stated age and cooperative. HEENT: Normal cephalic, atraumatic without obvious deformity. Pupils equal, round, and reactive to light. Extra ocular muscles intact. Conjunctivae/corneas clear. Neck: Supple, with full range of motion. No jugular venous distention. Trachea midline. Respiratory:  Clear to auscultation bilaterally.   No apparent PROVIDED HISTORY: Reason for exam:->Possible Stroke FINDINGS: The lungs are without acute focal process. There is no effusion or pneumothorax. The cardiomediastinal silhouette is without acute process. The osseous structures are without acute process. No acute process. CTA HEAD W CONTRAST    Result Date: 1/23/2022  EXAMINATION: CTA OF THE HEAD WITH CONTRAST 1/23/2022 11:29 am TECHNIQUE: CTA of the head/brain was performed with the administration of intravenous contrast. Multiplanar reformatted images are provided for review. MIP images are provided for review. Dose modulation, iterative reconstruction, and/or weight based adjustment of the mA/kV was utilized to reduce the radiation dose to as low as reasonably achievable. COMPARISON: None HISTORY: ORDERING SYSTEM PROVIDED HISTORY: dizzy, vertical nystagmus: rule out DVT TECHNOLOGIST PROVIDED HISTORY: Reason for exam:->dizzy, vertical nystagmus: rule out DVT Has a \"code stroke\" or \"stroke alert\" been called? ->No Decision Support Exception - unselect if not a suspected or confirmed emergency medical condition->Emergency Medical Condition (MA) FINDINGS: CT HEAD: BRAIN/VENTRICLES:  No acute intracranial hemorrhage or extraaxial fluid collection. Grey-white differentiation is maintained. No evidence of mass, mass effect or midline shift. No evidence of hydrocephalus. ORBITS: The visualized portion of the orbits demonstrate no acute abnormality. SINUSES:  The visualized paranasal sinuses and mastoid air cells demonstrate no acute abnormality. SOFT TISSUES/SKULL: No acute abnormality of the visualized skull or soft tissues. CTA HEAD: ANTERIOR CIRCULATION: No significant stenosis of the intracranial internal carotid, anterior cerebral, or middle cerebral arteries. No aneurysm. POSTERIOR CIRCULATION: No significant stenosis of the vertebral, basilar, or posterior cerebral arteries. No aneurysm. The left vertebral artery is dominant.  OTHER: No dural venous sinus thrombosis on this non-dedicated study. There is no evidence of vascular malformation. 1. No acute intracranial abnormality. 2. Unremarkable CTA of the head. MRI BRAIN WO CONTRAST    Result Date: 1/24/2022  EXAMINATION: MRI OF THE BRAIN WITHOUT CONTRAST  1/24/2022 7:01 am TECHNIQUE: Multiplanar multisequence MRI of the brain was performed without the administration of intravenous contrast. COMPARISON: CT angiogram head with contrast January 23, 2022 HISTORY: ORDERING SYSTEM PROVIDED HISTORY: TIA vs stroke TECHNOLOGIST PROVIDED HISTORY: Reason for exam:->TIA vs stroke What reading provider will be dictating this exam?->CRC FINDINGS: INTRACRANIAL STRUCTURES/VENTRICLES: There is no acute infarct. No mass effect or midline shift. No evidence of an acute intracranial hemorrhage. The ventricles and sulci are normal in size and configuration. The sellar/suprasellar regions appear unremarkable. The normal signal voids within the major intracranial vessels appear maintained. Scattered bifrontal white matter punctate subtle increased foci of increased T2/FLAIR signal are identified. ORBITS: The visualized portion of the orbits demonstrate no acute abnormality. SINUSES: The visualized paranasal sinuses and mastoid air cells demonstrate no acute abnormality. BONES/SOFT TISSUES: The bone marrow signal intensity appears normal. The soft tissues demonstrate no acute abnormality. No evidence of acute ischemia. Multifocal punctate foci of subtle increased T2/FLAIR signal within the bifrontal cerebral white matter. Differential diagnostic considerations include association with minimal microvascular ischemic disease, migraine headaches, posttraumatic sequela, vasculitis/inflammatory disease.        Discharge Medications:      Medication List      CONTINUE taking these medications    ascorbic acid 1000 MG tablet  Commonly known as: VITAMIN C     escitalopram 20 MG tablet  Commonly known as: Lexapro  Take 1 tablet by mouth daily     MULTI-VITAMIN DAILY PO     pantoprazole 40 MG tablet  Commonly known as: PROTONIX  Take 1 tablet by mouth every morning (before breakfast)     vitamin D 25 MCG (1000 UT) Caps            Time Spent on discharge is more than 45 minutes in the examination, evaluation, counseling and review of medications and discharge plan.    +++++++++++++++++++++++++++++++++++++++++++++++++  MD BRITTNEY Cedillo/ Librado Grijalva [address] [place] [address]  +++++++++++++++++++++++++++++++++++++++++++++++++  NOTE: This report was transcribed using voice recognition software. Every effort was made to ensure accuracy; however, inadvertent computerized transcription errors may be present.

## 2022-02-03 ENCOUNTER — OFFICE VISIT (OUTPATIENT)
Dept: ENT CLINIC | Age: 47
End: 2022-02-03
Payer: COMMERCIAL

## 2022-02-03 ENCOUNTER — PROCEDURE VISIT (OUTPATIENT)
Dept: AUDIOLOGY | Age: 47
End: 2022-02-03
Payer: COMMERCIAL

## 2022-02-03 VITALS
WEIGHT: 167 LBS | HEIGHT: 65 IN | DIASTOLIC BLOOD PRESSURE: 66 MMHG | BODY MASS INDEX: 27.82 KG/M2 | SYSTOLIC BLOOD PRESSURE: 116 MMHG

## 2022-02-03 DIAGNOSIS — R42 VERTIGO: Primary | ICD-10-CM

## 2022-02-03 DIAGNOSIS — R51.9 NONINTRACTABLE EPISODIC HEADACHE, UNSPECIFIED HEADACHE TYPE: ICD-10-CM

## 2022-02-03 DIAGNOSIS — H81.312 VERTIGO, AURAL, LEFT: ICD-10-CM

## 2022-02-03 PROCEDURE — 99204 OFFICE O/P NEW MOD 45 MIN: CPT | Performed by: NURSE PRACTITIONER

## 2022-02-03 PROCEDURE — 92557 COMPREHENSIVE HEARING TEST: CPT | Performed by: AUDIOLOGIST

## 2022-02-03 PROCEDURE — 92567 TYMPANOMETRY: CPT | Performed by: AUDIOLOGIST

## 2022-02-03 RX ORDER — MECLIZINE HCL 12.5 MG/1
12.5 TABLET ORAL 2 TIMES DAILY PRN
Qty: 15 TABLET | Refills: 0 | Status: SHIPPED | OUTPATIENT
Start: 2022-02-03 | End: 2022-02-13

## 2022-02-03 ASSESSMENT — ENCOUNTER SYMPTOMS
STRIDOR: 0
SINUS PAIN: 0
EYES NEGATIVE: 1
RESPIRATORY NEGATIVE: 1
SINUS PRESSURE: 0
RHINORRHEA: 0
SHORTNESS OF BREATH: 0

## 2022-02-03 NOTE — PROGRESS NOTES
This patient was referred for audiometric/tympanometric testing by GERALDINE Arredondo due to new-onset vertigo, with ataxia mainly to the left. Patient denied tinnitus, hearing loss and nausea/emesis, at this time. Audiometry revealed normal hearing sensitivity, through the frequency range,  bilaterally. There were mild dips at 3000 and 6000Hz, left ear. Reliability was good. Speech reception thresholds were in good agreement with the pure tone averages, bilaterally. Speech discrimination scores were 100%, bilaterally at 40dBHL. Tympanometry revealed normal middle ear peak pressure and compliance, bilaterally. Ipsilateral acoustic reflexes were present, bilaterally at 1000Hz. The results were reviewed with the patient. Recommendations for follow up will be made pending physician consult.     Flower Rosario CCC/PINEDA  Audiologist  [id number]  NPI#:  [id number]

## 2022-02-07 ENCOUNTER — CARE COORDINATION (OUTPATIENT)
Dept: OTHER | Facility: CLINIC | Age: 47
End: 2022-02-07

## 2022-02-07 RX ORDER — PANTOPRAZOLE SODIUM 40 MG/1
40 TABLET, DELAYED RELEASE ORAL
Qty: 60 TABLET | Refills: 3 | Status: SHIPPED
Start: 2022-02-07 | End: 2022-06-02

## 2022-02-14 ENCOUNTER — FOLLOWUP TELEPHONE ENCOUNTER (OUTPATIENT)
Dept: AUDIOLOGY | Age: 47
End: 2022-02-14

## 2022-02-14 NOTE — TELEPHONE ENCOUNTER
Called patient to schedule VNG. Called 2x both times phone was picked up but then call was dropped  Immediately.  Will call back this week and attempt again

## 2022-02-17 ENCOUNTER — CARE COORDINATION (OUTPATIENT)
Dept: OTHER | Facility: CLINIC | Age: 47
End: 2022-02-17

## 2022-02-17 ENCOUNTER — TELEPHONE (OUTPATIENT)
Dept: FAMILY MEDICINE CLINIC | Age: 47
End: 2022-02-17

## 2022-02-17 DIAGNOSIS — G45.9 TIA (TRANSIENT ISCHEMIC ATTACK): Primary | ICD-10-CM

## 2022-02-17 NOTE — CARE COORDINATION
[facility]  Genetic Counseling Consult    Patient:  Heather Guillory YOB: 1980   Date of Service:  19      Heather Guillory was seen at the [facility] for genetic consultation for the indication of preconception. The patient was accompanied by her partner, Chi to today's visit.        Impression/Plan:   1.  Heather had a genetic consultation today. Heather and Chi elected to pursue expanded carrier screening through [employer]. Results are expected within 10-14 days, and will be available in Access Closure.  We will contact her to discuss the results, and a copy will be forwarded to the office of the referring OB provider. Heather provided verbal permission for results to be left on her voicemail at [phone].    Pregnancy History:   /Parity:    Age at Delivery: 39 year old    Heather s pregnancy history is significant for three term vaginal deliveries of healthy boys, two of which were IVF pregnancies.    Heather stated they have two embryos created from their second round of IVF. They underwent PGT-A and one embryo is predicted to be chromosomally normal, XX. The other embryo would not likely result in a viable pregnancy. Heather shared that they are having a really tough time deciding if they would like to transfer or discard the embryo.     Fetal echocardiograms are typically recommended for In Vitro Fertilization (IVF) pregnancies. The average pregnancy has a 0.5-1.0% chance of a congenital heart defect. However, studies suggest an increased chance for a heart defect for IVF pregnancies, with estimates ranging from 1-3.3%. Fetal echocardiograms are typically performed at 20 to 24 weeks gestation.         Family History:   A three-generation pedigree was previously obtained, see genetic counseling note from 17. The family history was updated today and is scanned under the  Media  tab.     No  Perez 45 Transitions Follow Up Call    2022    Patient: Juan R Sheffield  Patient : 1975   MRN: [id number]  Reason for Admission: dizziness  Discharge Date: 22 RARS: Readmission Risk Score: 4.4 ( )    Spoke with: [facility] Transitions Follow Up Call    Needs to be reviewed by the provider   Additional needs identified to be addressed with provider: No  none             Method of communication with provider : none      Care Transition Nurse (CTN) contacted the patient by telephone to follow up after admission on 22. Verified name and  with patient as identifiers. Addressed changes since last contact: none  Discussed follow-up appointments. If no appointment was previously scheduled, appointment scheduling offered: Pt has f/u appts with pcp, audiology and ENT. CTN reviewed discharge instructions, medical action plan and red flags with patient and discussed any barriers to care and/or understanding of plan of care after discharge. Discussed appropriate site of care based on symptoms and resources available to patient including: PCP, Specialist, Benefits related nurse triage line, When to call 911 and [employer]. The patient agrees to contact the PCP office for questions related to their healthcare.      Patients top risk factors for readmission: depression  Interventions to address risk factors: Scheduled appointment with PCP-pt has attended all f/u appts and has pcp appt , Education of patient/family/caregiver/guardian to support self-management-red flags and appropriate sites of care, including PCP, specialist, nurse access line, when to go to urgent care/ED and when to call 911 for life threatening emergencies and pt verbalized understanding and Assessment and support for treatment adherence and medication management-ACM answered pt questions about meds      Non-[facility] follow up appointment(s): none known    Pt reports she still is experiencing dizziness, but is getting along better with it. Pt states that hearing eval was normal and ENT could not determine cause of dizziness. Pt reports she continues to do the vestibular exercises, but dont think they are working well. Pt has attended all f/u appts. Pt states that she is to get an echo and has contacted her pcp for order. Pt states that her  recently had surgery and she has been caring for him. ACM reviewed red flags and appropriate sites of care, including PCP, specialist, nurse access line, when to go to urgent care/ED and when to call 911 for life threatening emergencies and pt verbalized understanding. Pt denies any further questions, needs or concerns; no further care management needs at this time. CTN provided contact information for future needs. No further follow-up call indicated based on severity of symptoms and risk factors. No further outreach scheduled with this ACM, ACM will sign off care team at this time. Patient has been provided with this ACM's contact information if any questions, concerns or needs arise. Care Transitions Subsequent and Final Call    Subsequent and Final Calls  Do you have any ongoing symptoms?: Yes  Onset of Patient-reported symptoms: Other  Have your medications changed?: No  Do you have any questions related to your medications?: No  Do you currently have any active services?: No  Do you have any needs or concerns that I can assist you with?: No  Identified Barriers: None  Care Transitions Interventions     Other Services: Completed (Comment: Educated pt on discharge instructions, red flags and appropriate sites of care)   Other Interventions:         Goals      Conditions and Symptoms      I will schedule office visits, as directed by my provider. I will keep my appointment or reschedule if I have to cancel. I will notify my provider of any barriers to my plan of care.   I will notify my provider of any symptoms that indicate a worsening new significant findings were reported by Heather. Otherwise, the reported family history is negative for multiple miscarriages, stillbirths, birth defects, mental retardation, known genetic conditions, and consanguinity.       Carrier Screening:   The patient reports that the father of the pregnancy has  ancestry:     Cystic fibrosis is an autosomal recessive genetic condition that occurs with increased frequency in individuals of  ancestry and carrier screening for this condition is available.  In addition,  screening in the [facility] includes cystic fibrosis.    The patient reports that she is of Ash[employer]azi [employer] ancestry:     There is a group of several autosomal recessive genetic conditions that occur with increased frequency in individuals of Ashkenazi Synagogue ancestry, such as Axel Sachs disease and Canavan disease.  Carrier screening is available for a variety of these conditions.      Expanded carrier screening for mutations in a large panel of genes associated with autosomal recessive conditions including cystic fibrosis, spinal muscular atrophy, and others, is now available.      The couple previously underwent expanded carrier screening in  for 87 conditions through [employer]. 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 Result was available for review today.       We discussed how the current expanded carrier screening panel includes 176 conditions. We reviewed that our genetic information does not change over time, however our technology and understanding of genetics continues to evolve. We reviewed the difference between targeted genotyping and sequencing technology. In order to get the most information, the couple elected to pursue expanded carrier screening through [employer] to encompass the additional conditions (ID:[id number]). Results are expected  of my condition. Barriers: lack of education  Plan for overcoming my barriers: Work with ACM  Confidence: 8/10  Anticipated Goal Completion Date: 2/25/22 1/25/22- Pt had f/u appt with PCP today; had called Dr Yuliya Ching office for f/u appt, left message  2/17/22 - Pt had f/u appt with ENT on 2/3 and upcoming 3/22; had appt with audiology on 2/3 and upcoming 3/10; appt with pcp on 2/25. Goal completed          Follow Up  Future Appointments   Date Time Provider Hayley Marcos   2/25/2022 10:00 AM DO Freddy Barclaycullen [facility]   3/10/2022  2:30 PM SCHEDULE, RICARDO AUDIOLOGY [facility] RICARDO AUDIO [place] [facility]   3/22/2022 11:00 AM ROYCE Padilla - CNP [facility]   5/4/2022 11:30 AM MD Sanjay Botello [facility]   12/8/2022  7:30 AM Carolynne Sever, MD [facility] Angel Combs MSN, RN   Ambulatory Care Manager  Associate Care Management  Cell [phone]  [email or web address] within 10-14 days. We discussed how if Heather and Chi are found to be carriers for the same condition, PGT-M and prenatal testing is available. Given that the embryos have been created and cells have been taken for PGT-A screening previously, it may be helpful to discuss with their infertility clinic if PGT-M would be available on the frozen embryo.        Risk Assessment:   We reviewed that the risk for fetal chromosome abnormalities increases with maternal age. We discussed specific features of common chromosome abnormalities, including Down syndrome, trisomy 13, trisomy 18, and sex chromosome trisomies.     Heather reported that egg retrieval was performed at age 37.     - At age 37 at midtrimester, the risk to have a baby with Down syndrome is 1 in 274.     - At age 37 at midtrimester, the risk to have a baby with any chromosome abnormality is 1 in 135.       PGT-A was reportedly performed on the embryo, likely reducing these estimates.     The couple inquired about advanced paternal age.     We discussed how there is a slight increased risk for de raymond, single gene disorders in men of advanced paternal age. Due to the increase in DNA mutations in sperm, older men are at higher risk of fathering children with certain autosomal dominant genetic disorders, such as achondroplasia. There is also increasing evidence that paternal age is associated with an increased risk of multifactorial conditions such as schizophrenia and autism. There is currently no clearly accepted definition of advanced paternal age, although a frequently used criterion is any man aged 40-45 years or older at the time of conception. Given the variability of these conditions, a level II ultrasound would not necessarily detect these disorders. We reviewed that there is limited testing options available for this indication. Chi was <41yo when sperm was collected for embryo creation.     Testing Options:   Additional prenatal testing options  were not discussed in great detail, however we briefly reviewed the following. We also discussed how PGT-A is a screening test with the possibility of false positives and false negatives. Current guidelines suggest that we should offer prenatal screening for IVF pregnancies regardless of if screening was performed on the embryo prior to implantation.      First trimester screening    First trimester ultrasound with nuchal translucency and nasal bone assessments, maternal plasma hCG, MAKENZIE-A, and AFP measurement    Screens for fetal trisomy 21, trisomy 13, and trisomy 18    Cannot screen for open neural tube defects; maternal serum AFP after 15 weeks is recommended     Non-invasive Prenatal Testing (NIPT)    Maternal plasma cell-free DNA testing; first trimester ultrasound with nuchal translucency and nasal bone assessment is recommended, when appropriate    Screens for fetal trisomy 21, trisomy 13, trisomy 18, and sex chromosome aneuploidy    Cannot screen for open neural tube defects; maternal serum AFP after 15 weeks is recommended     Chorionic villus sampling (CVS)    Invasive procedure typically performed in the first trimester by which placental villi are obtained for the purpose of chromosome analysis and/or other prenatal genetic analysis    Diagnostic results; >99% sensitivity for fetal chromosome abnormalities    Cannot test for open neural tube defects; maternal serum AFP after 15 weeks is recommended     Genetic Amniocentesis    Invasive procedure typically performed in the second trimester by which amniotic fluid is obtained for the purpose of chromosome analysis and/or other prenatal genetic analysis    Diagnostic results; >99% sensitivity for fetal chromosome abnormalities    AFAFP measurement tests for open neural tube defects    We reviewed the benefits and limitations of this testing.  Screening tests provide a risk assessment specific to the pregnancy for certain fetal chromosome abnormalities, but  cannot definitively diagnose or exclude a fetal chromosome abnormality.  Follow-up genetic counseling and consideration of diagnostic testing is recommended with any abnormal screening result.     Diagnostic tests carry inherent risks- including risk of miscarriage- that require careful consideration.  These tests can detect fetal chromosome abnormalities with greater than 99% certainty.  Results can be compromised by maternal cell contamination or mosaicism, and are limited by the resolution of cytogenetic G-banding technology.  There is no screening nor diagnostic test that can detect all forms of birth defects or mental disability.     It was a pleasure to be involved with Heather puri care. Face-to-face time of the meeting was 35 minutes.    Vivian Bustos MS, [facility]  Maternal Fetal Medicine  [facility]  Ph: [phone]  [email or web address]

## 2022-02-21 ENCOUNTER — TELEPHONE (OUTPATIENT)
Dept: ENT CLINIC | Age: 47
End: 2022-02-21

## 2022-02-21 DIAGNOSIS — R42 VERTIGO: Primary | ICD-10-CM

## 2022-02-21 RX ORDER — DIAZEPAM 2 MG/1
2 TABLET ORAL EVERY 12 HOURS PRN
Qty: 10 TABLET | Refills: 0 | Status: CANCELLED | OUTPATIENT
Start: 2022-02-21 | End: 2022-03-03

## 2022-02-21 NOTE — TELEPHONE ENCOUNTER
Pt called in stating she has taken the Meclizine twice and each time it has made her dizziness worse. Asking if there is an alternative medication. Please advise.

## 2022-02-22 RX ORDER — DIAZEPAM 2 MG/1
2 TABLET ORAL EVERY 12 HOURS PRN
Qty: 10 TABLET | Refills: 0 | Status: SHIPPED | OUTPATIENT
Start: 2022-02-22 | End: 2022-03-04

## 2022-02-22 NOTE — TELEPHONE ENCOUNTER
PDMP is reviewed with no apparent risk of abuse or diversion. Prescription sent to pharmacy for Valium, 2 mg 1 every 12 hours as needed for dizziness. Reinforced to patient that they should only be used when symptoms are at their worst and intolerable.

## 2022-02-25 ENCOUNTER — TELEMEDICINE (OUTPATIENT)
Dept: FAMILY MEDICINE CLINIC | Age: 47
End: 2022-02-25
Payer: COMMERCIAL

## 2022-02-25 DIAGNOSIS — R42 DIZZINESS: ICD-10-CM

## 2022-02-25 PROCEDURE — 99213 OFFICE O/P EST LOW 20 MIN: CPT | Performed by: STUDENT IN AN ORGANIZED HEALTH CARE EDUCATION/TRAINING PROGRAM

## 2022-02-25 NOTE — PROGRESS NOTES
Patient:  Riley Akhtar 42 y.o. female     Date of Service: 1/25/22      Chiefcomplaint:   Chief Complaint   Patient presents with    Dizziness     History of Present Illness   Dizziness - remains present. Almost all the time. Testing on 10th for ent - vng testing  meclizine not helpful - made dizziness worse. Made her very tired. Hasn't tried valium  CT scan and MRI negative for structural cause (some possible t2 imaging abnormalities noted)  [place] - juli hallpike positive on left. consideratino of otolith. Vestibular neuritis? Lasts a while, fall towards left. No auditory symptoms. Had some digestive stuff prior to this. Having sore throat    Review notes from neurology (bppv maybe), bmp, cbc, thyroid labs (wnl)  Learning coping - closing eyes makes it more tolerable. Having headaches that she describes as pressure in her head. Hasn't seen eye doctor      Pertinent Medical, Family, Surgical, Social History:  Past Medical History:   Diagnosis Date    Depression     Endometriosis     Vertigo      Physical Exam   Vitals: There were no vitals taken for this visit. General Appearance: Alert, oriented, no acute distress  HEENT: No scleral icterus. No visible discharge from eyes  Neuro: Alert and oriented. Psych: Appropriate mood and appropriate affect    Assessment and Plan   1. Dizziness    Recommend stability of lifestyle factors - diet, activity, stress as much as possible. Labs ok, imaging ok but consideration of neuro if no info from ent testing given nonspecific findings. Also recommend eye doctor fu to see if contribution from imaging and check pressures. Recheck after ent fu week of 3/20 for consideration of neuro. Trial valium as already prescribed as she has not done this yet. Return in about 4 weeks (around 3/25/2022). Jarod Messina, DO     This document may have been prepared at least partially through the use of voice recognition software.  Although effort is taken to assure the accuracy of this document, it is possible that grammatical, syntax,  or spelling errors may occur.

## 2022-03-10 ENCOUNTER — HOSPITAL ENCOUNTER (OUTPATIENT)
Dept: AUDIOLOGY | Age: 47
Discharge: HOME | End: 2022-03-10
Payer: COMMERCIAL

## 2022-03-10 PROCEDURE — 92540 BASIC VESTIBULAR EVALUATION: CPT | Performed by: AUDIOLOGIST

## 2022-03-10 PROCEDURE — 92537 CALORIC VSTBLR TEST W/REC: CPT | Performed by: AUDIOLOGIST

## 2022-03-11 NOTE — PROGRESS NOTES
VNG EVALUATION    REASON FOR REFERRAL:  This patient was referred for VNG testing by Bon Solorio CNP. Patient had episode of vertigo in January. Feels off balance with standing and walking. Can happen when moving or when sitting still. Patient took her Lexapro yesterday (3/10/22) morning because she cannot go without for the full 48 hours. RESULTS:  Bithermal caloric irrigations revealed appropriate beating nystagmus with no unilateral weakness. Directional preponderance and fixation suppression were within normal limits. No irregular eye movements were recorded during saccades, pendular tracking, or optokinetic testing. No significant nystagmus was recorded during gaze or positional testing. IMPRESSION:  VNG test results indicates vestibular function within normal limits bilaterally. Oculomotor and positional test results were within normal limits. If I can be of further assistance or provide additional information, please do not hesitate to contact this office.     Thank you for the referral.      __________________________________  Elida Negro/CCC-A  [place] Lic # [id number]

## 2022-03-22 ENCOUNTER — OFFICE VISIT (OUTPATIENT)
Dept: ENT CLINIC | Age: 47
End: 2022-03-22
Payer: COMMERCIAL

## 2022-03-22 ENCOUNTER — HOSPITAL ENCOUNTER (OUTPATIENT)
Age: 47
Discharge: HOME | End: 2022-03-22
Payer: COMMERCIAL

## 2022-03-22 VITALS — WEIGHT: 167 LBS | HEIGHT: 65 IN | BODY MASS INDEX: 27.82 KG/M2

## 2022-03-22 DIAGNOSIS — R42 VERTIGO: Primary | ICD-10-CM

## 2022-03-22 DIAGNOSIS — R51.9 NONINTRACTABLE EPISODIC HEADACHE, UNSPECIFIED HEADACHE TYPE: ICD-10-CM

## 2022-03-22 LAB
C-REACTIVE PROTEIN: 0.3 MG/DL (ref 0–0.4)
SEDIMENTATION RATE, ERYTHROCYTE: 13 MM/HR (ref 0–20)

## 2022-03-22 PROCEDURE — 36415 COLL VENOUS BLD VENIPUNCTURE: CPT

## 2022-03-22 PROCEDURE — 86140 C-REACTIVE PROTEIN: CPT

## 2022-03-22 PROCEDURE — 99213 OFFICE O/P EST LOW 20 MIN: CPT | Performed by: NURSE PRACTITIONER

## 2022-03-22 PROCEDURE — 85651 RBC SED RATE NONAUTOMATED: CPT

## 2022-03-22 ASSESSMENT — ENCOUNTER SYMPTOMS
RESPIRATORY NEGATIVE: 1
EYES NEGATIVE: 1
SINUS PAIN: 0
SINUS PRESSURE: 0
STRIDOR: 0
SHORTNESS OF BREATH: 0
RHINORRHEA: 0

## 2022-03-22 NOTE — PROGRESS NOTES
[facility] Otolaryngology  Dr. Sharon Mares. Tete Schwab. Ms.Ed        Patient Name:  Steven Robison  :  1975     CHIEF C/O:    Chief Complaint   Patient presents with    Follow-up     VNG, states that she had some labs done today after seeing eye dr. cheli newman came back 13 others are waiting for results. HISTORY OBTAINED FROM:  patient    HISTORY OF PRESENT ILLNESS:       Roverto Cuba is a 42y.o. year old female, here today for follow up of dizziness. Last seen 6 weeks ago  VNG completed and WNL  Seen by PCP and sent for vision exam by Dr. Johnny Harris  Exam normal, Dr. Jared Mccrary MS, ordered sed rate and C-reactive protein  Previous MRI showes 3 signal abnormalities in the frontal lobe  No change in symptoms  Has constant sensation of disequilibrium with mild headach  Symptoms do intensify with movement, cause room spinning vertigo  No new changes to hearing  Symptoms are affecting sleep patterns.       Past Medical History:   Diagnosis Date    Depression     Endometriosis     Vertigo      Past Surgical History:   Procedure Laterality Date    APPENDECTOMY  2009    CHOLECYSTECTOMY      HAND SURGERY Right 2020    RIGHT DORSAL WRIST GANGLION EXCISION performed by Gini Villegas MD at [facility] 178  [id number]    pain in abdomen    TONSILLECTOMY      WISDOM TOOTH EXTRACTION         Current Outpatient Medications:     pantoprazole (PROTONIX) 40 MG tablet, Take 1 tablet by mouth 2 times daily (before meals), Disp: 60 tablet, Rfl: 3    escitalopram (LEXAPRO) 20 MG tablet, Take 1 tablet by mouth daily, Disp: 90 tablet, Rfl: 1  Compazine [prochlorperazine maleate] and Codeine  Social History     Tobacco Use    Smoking status: Never Smoker    Smokeless tobacco: Never Used   Vaping Use    Vaping Use: Never used   Substance Use Topics    Alcohol use: No    Drug use: No     Family History   Problem Relation Age of Onset    Other Mother         hypertrophic cardiomyopathy, fibromyalgia, sarcoid    Heart Disease Father        Review of Systems   Constitutional: Negative. Negative for activity change and appetite change. HENT: Negative for congestion, hearing loss, postnasal drip, rhinorrhea, sinus pressure and sinus pain. Sound sensitivity in left ear   Eyes: Negative. Respiratory: Negative. Negative for shortness of breath and stridor. Cardiovascular: Negative. Negative for chest pain and palpitations. Endocrine: Negative. Musculoskeletal: Negative. Skin: Negative. Neurological: Positive for dizziness and headaches. Hematological: Negative. Psychiatric/Behavioral: Negative. Ht 5' 5\" (1.651 m)   Wt 167 lb (75.8 kg)   BMI 27.79 kg/m²   Physical Exam  Constitutional:       Appearance: Normal appearance. HENT:      Head: Normocephalic. Right Ear: External ear normal.      Left Ear: External ear normal.      Nose: Nose normal. No rhinorrhea. Mouth/Throat:      Lips: Pink. Mouth: Mucous membranes are moist.   Eyes:      Conjunctiva/sclera: Conjunctivae normal.      Pupils: Pupils are equal, round, and reactive to light. Cardiovascular:      Rate and Rhythm: Normal rate and regular rhythm. Pulses: Normal pulses. Pulmonary:      Effort: Pulmonary effort is normal. No respiratory distress. Breath sounds: No stridor. Musculoskeletal:         General: Normal range of motion. Cervical back: Normal range of motion. No rigidity. No muscular tenderness. Skin:     General: Skin is warm and dry. Neurological:      General: No focal deficit present. Mental Status: She is alert and oriented to person, place, and time. Psychiatric:         Mood and Affect: Mood normal.         Behavior: Behavior normal.         Thought Content: Thought content normal.         Judgment: Judgment normal.     VNG:  VNG EVALUATION     REASON FOR REFERRAL:  This patient was referred for VNG testing by Marycarmen Ellis CNP.  Patient had episode of vertigo in January. Feels off balance with standing and walking. Can happen when moving or when sitting still. Patient took her Lexapro yesterday (3/10/22) morning because she cannot go without for the full 48 hours. RESULTS:  Bithermal caloric irrigations revealed appropriate beating nystagmus with no unilateral weakness. Directional preponderance and fixation suppression were within normal limits. No irregular eye movements were recorded during saccades, pendular tracking, or optokinetic testing. No significant nystagmus was recorded during gaze or positional testing.         IMPRESSION:  VNG test results indicates vestibular function within normal limits bilaterally. Oculomotor and positional test results were within normal limits.        If I can be of further assistance or provide additional information, please do not hesitate to contact this office.     Thank you for the referral.        __________________________________  Elida Collado/[facility]  [address] # [id number]    IMPRESSION/PLAN:    Sharon Jones was seen today for follow-up. Diagnoses and all orders for this visit:    Vertigo  -     [address], [facility], Neurology, [place]    Nonintractable episodic headache, unspecified headache type  -     [facility], [facility] R[facility]AN Zamora, Neurology, [place]      VNG results reviewed with patient with no suggestion of peripheral or central causation for her dizziness symptoms. Patient has previously undergone an MRI since the onset of her symptoms with 3 areas of question in the frontal lobes. At this time she will be referred to [facility] Pres[place] at the office of Dr. Josie Jovel for further evaluation. She will follow up in this office as needed. She is instructed to call with any new or worsening symptoms in the future.         Tabatha Valdivia, MSN, FNP-C  [facility], Nose and Throat    The information contained in this note has been dictated using drug and medical speech recognition software and may contain errors

## 2022-03-24 ENCOUNTER — TELEPHONE (OUTPATIENT)
Dept: FAMILY MEDICINE CLINIC | Age: 47
End: 2022-03-24

## 2022-03-24 ENCOUNTER — OFFICE VISIT (OUTPATIENT)
Dept: FAMILY MEDICINE CLINIC | Age: 47
End: 2022-03-24
Payer: COMMERCIAL

## 2022-03-24 VITALS
OXYGEN SATURATION: 99 % | TEMPERATURE: 97.3 F | DIASTOLIC BLOOD PRESSURE: 61 MMHG | SYSTOLIC BLOOD PRESSURE: 90 MMHG | HEART RATE: 67 BPM | WEIGHT: 162.6 LBS | RESPIRATION RATE: 16 BRPM | BODY MASS INDEX: 27.09 KG/M2 | HEIGHT: 65 IN

## 2022-03-24 DIAGNOSIS — Z76.0 MEDICATION REFILL: ICD-10-CM

## 2022-03-24 DIAGNOSIS — R42 DIZZINESS: Primary | ICD-10-CM

## 2022-03-24 PROBLEM — I95.9 LOW BP: Status: ACTIVE | Noted: 2022-03-24

## 2022-03-24 PROCEDURE — 99214 OFFICE O/P EST MOD 30 MIN: CPT | Performed by: STUDENT IN AN ORGANIZED HEALTH CARE EDUCATION/TRAINING PROGRAM

## 2022-03-24 RX ORDER — ESCITALOPRAM OXALATE 20 MG/1
20 TABLET ORAL DAILY
Qty: 90 TABLET | Refills: 1 | Status: SHIPPED
Start: 2022-03-24 | End: 2022-07-19

## 2022-03-24 NOTE — PROGRESS NOTES
Patient:  Angel Gordillo 42 y.o. female     Date of Service: 1/25/22      Chiefcomplaint:   Chief Complaint   Patient presents with    Dizziness     follow up from ENT to discuss vertigo; sending to neurologist     History of Present Illness   Dizziness - remains present. Almost all the time. Testing on 10th for ent (vng) unremarkable. meclizine not helpful - made dizziness worse. Made her very tired. Hasn't tried valium  CT scan and MRI negative for structural cause (some possible t2 imaging abnormalities noted)  [place] - juli hallpike positive on left. consideratino of otolith. Vestibular neuritis? Lasts a while, fall towards left. No auditory symptoms. Had some digestive stuff prior to this. Having sore throat    Review notes from neurology (bppv maybe), bmp, cbc, thyroid labs (wnl)  Learning coping - closing eyes makes it more tolerable. Having headaches that she describes as pressure in her head. Eye doctor suggested ESR, CrP and neuro fu. EKG previously normal  Echo ordered but not completed. Pertinent Medical, Family, Surgical, Social History:  Past Medical History:   Diagnosis Date    Depression     Endometriosis     Vertigo      Physical Exam   Vitals: BP 94/61 (Site: Right Upper Arm, Position: Sitting, Cuff Size: Large Adult)   Pulse 60   Temp 97.3 °F (36.3 °C) (Temporal)   Resp 16   Ht 5' 5\" (1.651 m)   Wt 162 lb 9.6 oz (73.8 kg)   SpO2 99%   BMI 27.06 kg/m²   General Appearance: Alert, oriented, no acute distress  HEENT: No scleral icterus. No visible discharge from eyes  Heart: RRR  Lungs: CTA b/l        Neuro: Alert and oriented. Psych: Appropriate mood and appropriate affect  Orthostatic blood pressure - normal    Assessment and Plan   1. Dizziness  No etiology identified after ENT, optometry workup. ESR, CRP ok. Meclizine not helpful. Orthostatics neg today. Recommend fu with neuro. MRI already done. Also consideration of medication effect.  Will have EGD 4/19 and then can presumably wean off protonix. Could consider weaning lexapro but would need to start additional med and patient has tried zoloft and effexor and wellbutrin already and didn't tolerate or weren't effective. 2. Medication refill  -     escitalopram (LEXAPRO) 20 MG tablet; Take 1 tablet by mouth daily, Disp-90 tablet, R-1Normal      No follow-ups on file. Pepe Morse,      This document may have been prepared at least partially through the use of voice recognition software. Although effort is taken to assure the accuracy of this document, it is possible that grammatical, syntax,  or spelling errors may occur.

## 2022-04-18 RX ORDER — M-VIT,TX,IRON,MINS/CALC/FOLIC 27MG-0.4MG
1 TABLET ORAL DAILY
COMMUNITY

## 2022-04-18 NOTE — PROGRESS NOTES
[address] [facility]                                                                                                                    PRE OP INSTRUCTIONS FOR  Sharif Shore        Date: 4/18/2022    Date of surgery: 4/19/22   Arrival Time: Hospital will call you between 5pm and 7pm with your final arrival time for surgery    1. Do not eat or drink anything after midnight or 4 hours prior to surgery. This includes no water, chewing gum, mints or ice chips. 2. Take the following medications with a small sip of water on the morning of Surgery: none     3. Diabetics may take evening dose of insulin but none after midnight. If you feel symptomatic or low blood sugar morning of surgery drink 1-2 ounces of apple juice only. 4. Aspirin, Ibuprofen, Advil, Naproxen, Vitamin E and other Anti-inflammatory products should be stopped  before surgery  as directed by your physician. Take Tylenol only unless instructed otherwise by your surgeon. 5. Check with your Doctor regarding stopping Plavix, Coumadin, Lovenox, Eliquis, Effient, or other blood thinners. 6. Do not smoke,use illicit drugs and do not drink any alcoholic beverages 24 hours prior to surgery. 7. You may brush your teeth the morning of surgery. DO NOT SWALLOW WATER    8. You MUST make arrangements for a responsible adult to take you home after your surgery. You will not be allowed to leave alone or drive yourself home. It is strongly suggested someone stay with you the first 24 hrs. Your surgery will be cancelled if you do not have a ride home. 9. PEDIATRIC PATIENTS ONLY:  A parent/legal guardian must accompany a child scheduled for surgery and plan to stay at the hospital until the child is discharged. Please do not bring other children with you.     10. Please wear simple, loose fitting clothing to the hospital.  Connor Ko not bring valuables (money, credit cards, checkbooks, etc.) Do not wear any makeup (including no eye makeup) or nail polish on your fingers or toes. 11. DO NOT wear any jewelry or piercings on day of surgery. All body piercing jewelry must be removed. 12. Shower the night before surgery with ___Antibacterial soap /HOLLY WIPES________    13. TOTAL JOINT REPLACEMENT/HYSTERECTOMY PATIENTS ONLY---Remember to bring Blood Bank bracelet to the hospital on the day of surgery. 14. If you have a Living Will and Durable Power of  for Healthcare, please bring in a copy. 15. If appropriate bring crutches, inspirex, WALKER, CANE etc... 12. Notify your Surgeon if you develop any illness between now and surgery time, cough, cold, fever, sore throat, nausea, vomiting, etc.  Please notify your surgeon if you experience dizziness, shortness of breath or blurred vision between now & the time of your surgery. 17. If you have ___dentures, they will be removed before going to the OR; we will provide you a container. If you wear ___contact lenses or _x__glasses, they will be removed; please bring a case for them. 18. To provide excellent care visitors will be limited to 2 in the room at any given time. 19. Please bring picture ID and insurance card. 20. Sleep apnea patients need to bring CPAP AND SETTINGS to hospital on day of surgery. 21. During flu season no children under the age of 15 are permitted in the hospital for the safety of all patients. 22. Other                  Please call AMBULATORY CARE if you have any further questions.    [address] [facility]     [address]

## 2022-04-19 ENCOUNTER — ANESTHESIA EVENT (OUTPATIENT)
Dept: ENDOSCOPY | Age: 47
End: 2022-04-19
Payer: COMMERCIAL

## 2022-04-19 ENCOUNTER — HOSPITAL ENCOUNTER (OUTPATIENT)
Age: 47
Setting detail: OUTPATIENT SURGERY
Discharge: HOME | End: 2022-04-19
Attending: STUDENT IN AN ORGANIZED HEALTH CARE EDUCATION/TRAINING PROGRAM | Admitting: STUDENT IN AN ORGANIZED HEALTH CARE EDUCATION/TRAINING PROGRAM
Payer: COMMERCIAL

## 2022-04-19 ENCOUNTER — ANESTHESIA (OUTPATIENT)
Dept: ENDOSCOPY | Age: 47
End: 2022-04-19
Payer: COMMERCIAL

## 2022-04-19 VITALS
SYSTOLIC BLOOD PRESSURE: 112 MMHG | OXYGEN SATURATION: 98 % | HEART RATE: 71 BPM | WEIGHT: 159 LBS | BODY MASS INDEX: 26.49 KG/M2 | TEMPERATURE: 97.8 F | RESPIRATION RATE: 16 BRPM | DIASTOLIC BLOOD PRESSURE: 59 MMHG | HEIGHT: 65 IN

## 2022-04-19 VITALS
SYSTOLIC BLOOD PRESSURE: 104 MMHG | RESPIRATION RATE: 17 BRPM | DIASTOLIC BLOOD PRESSURE: 63 MMHG | OXYGEN SATURATION: 100 %

## 2022-04-19 DIAGNOSIS — G45.9 TIA (TRANSIENT ISCHEMIC ATTACK): ICD-10-CM

## 2022-04-19 LAB — HCG(URINE) PREGNANCY TEST: NEGATIVE

## 2022-04-19 PROCEDURE — 88305 TISSUE EXAM BY PATHOLOGIST: CPT

## 2022-04-19 PROCEDURE — 2580000003 HC RX 258: Performed by: ANESTHESIOLOGY

## 2022-04-19 PROCEDURE — 7100000010 HC PHASE II RECOVERY - FIRST 15 MIN: Performed by: STUDENT IN AN ORGANIZED HEALTH CARE EDUCATION/TRAINING PROGRAM

## 2022-04-19 PROCEDURE — 7100000011 HC PHASE II RECOVERY - ADDTL 15 MIN: Performed by: STUDENT IN AN ORGANIZED HEALTH CARE EDUCATION/TRAINING PROGRAM

## 2022-04-19 PROCEDURE — 88342 IMHCHEM/IMCYTCHM 1ST ANTB: CPT

## 2022-04-19 PROCEDURE — 3700000000 HC ANESTHESIA ATTENDED CARE: Performed by: STUDENT IN AN ORGANIZED HEALTH CARE EDUCATION/TRAINING PROGRAM

## 2022-04-19 PROCEDURE — 2709999900 HC NON-CHARGEABLE SUPPLY: Performed by: STUDENT IN AN ORGANIZED HEALTH CARE EDUCATION/TRAINING PROGRAM

## 2022-04-19 PROCEDURE — 3609027000 HC COLONOSCOPY: Performed by: STUDENT IN AN ORGANIZED HEALTH CARE EDUCATION/TRAINING PROGRAM

## 2022-04-19 PROCEDURE — 3700000001 HC ADD 15 MINUTES (ANESTHESIA): Performed by: STUDENT IN AN ORGANIZED HEALTH CARE EDUCATION/TRAINING PROGRAM

## 2022-04-19 PROCEDURE — 6360000002 HC RX W HCPCS

## 2022-04-19 PROCEDURE — 45378 DIAGNOSTIC COLONOSCOPY: CPT | Performed by: STUDENT IN AN ORGANIZED HEALTH CARE EDUCATION/TRAINING PROGRAM

## 2022-04-19 PROCEDURE — 3609012400 HC EGD TRANSORAL BIOPSY SINGLE/MULTIPLE: Performed by: STUDENT IN AN ORGANIZED HEALTH CARE EDUCATION/TRAINING PROGRAM

## 2022-04-19 PROCEDURE — 43239 EGD BIOPSY SINGLE/MULTIPLE: CPT | Performed by: STUDENT IN AN ORGANIZED HEALTH CARE EDUCATION/TRAINING PROGRAM

## 2022-04-19 PROCEDURE — 81025 URINE PREGNANCY TEST: CPT

## 2022-04-19 PROCEDURE — 2580000003 HC RX 258

## 2022-04-19 RX ORDER — SODIUM CHLORIDE 0.9 % (FLUSH) 0.9 %
5-40 SYRINGE (ML) INJECTION PRN
Status: DISCONTINUED | OUTPATIENT
Start: 2022-04-19 | End: 2022-04-19 | Stop reason: HOSPADM

## 2022-04-19 RX ORDER — SODIUM CHLORIDE 0.9 % (FLUSH) 0.9 %
5-40 SYRINGE (ML) INJECTION EVERY 12 HOURS SCHEDULED
Status: CANCELLED | OUTPATIENT
Start: 2022-04-19

## 2022-04-19 RX ORDER — ONDANSETRON 2 MG/ML
4 INJECTION INTRAMUSCULAR; INTRAVENOUS EVERY 6 HOURS PRN
Status: CANCELLED | OUTPATIENT
Start: 2022-04-19

## 2022-04-19 RX ORDER — SODIUM CHLORIDE 0.9 % (FLUSH) 0.9 %
5-40 SYRINGE (ML) INJECTION EVERY 12 HOURS SCHEDULED
Status: DISCONTINUED | OUTPATIENT
Start: 2022-04-19 | End: 2022-04-19 | Stop reason: HOSPADM

## 2022-04-19 RX ORDER — SODIUM CHLORIDE, SODIUM LACTATE, POTASSIUM CHLORIDE, CALCIUM CHLORIDE 600; 310; 30; 20 MG/100ML; MG/100ML; MG/100ML; MG/100ML
INJECTION, SOLUTION INTRAVENOUS CONTINUOUS PRN
Status: DISCONTINUED | OUTPATIENT
Start: 2022-04-19 | End: 2022-04-19 | Stop reason: SDUPTHER

## 2022-04-19 RX ORDER — SODIUM CHLORIDE, SODIUM LACTATE, POTASSIUM CHLORIDE, CALCIUM CHLORIDE 600; 310; 30; 20 MG/100ML; MG/100ML; MG/100ML; MG/100ML
INJECTION, SOLUTION INTRAVENOUS CONTINUOUS
Status: DISCONTINUED | OUTPATIENT
Start: 2022-04-19 | End: 2022-04-19 | Stop reason: HOSPADM

## 2022-04-19 RX ORDER — PROPOFOL 10 MG/ML
INJECTION, EMULSION INTRAVENOUS PRN
Status: DISCONTINUED | OUTPATIENT
Start: 2022-04-19 | End: 2022-04-19 | Stop reason: SDUPTHER

## 2022-04-19 RX ORDER — ONDANSETRON 4 MG/1
4 TABLET, ORALLY DISINTEGRATING ORAL EVERY 8 HOURS PRN
Status: CANCELLED | OUTPATIENT
Start: 2022-04-19

## 2022-04-19 RX ORDER — SODIUM CHLORIDE 9 MG/ML
25 INJECTION, SOLUTION INTRAVENOUS PRN
Status: DISCONTINUED | OUTPATIENT
Start: 2022-04-19 | End: 2022-04-19 | Stop reason: HOSPADM

## 2022-04-19 RX ORDER — SODIUM CHLORIDE 9 MG/ML
25 INJECTION, SOLUTION INTRAVENOUS PRN
Status: CANCELLED | OUTPATIENT
Start: 2022-04-19

## 2022-04-19 RX ORDER — SODIUM CHLORIDE 0.9 % (FLUSH) 0.9 %
5-40 SYRINGE (ML) INJECTION PRN
Status: CANCELLED | OUTPATIENT
Start: 2022-04-19

## 2022-04-19 RX ADMIN — PROPOFOL 680 MG: 10 INJECTION, EMULSION INTRAVENOUS at 10:27

## 2022-04-19 RX ADMIN — PHENYLEPHRINE HYDROCHLORIDE 200 MCG: 10 INJECTION INTRAVENOUS at 11:03

## 2022-04-19 RX ADMIN — PHENYLEPHRINE HYDROCHLORIDE 200 MCG: 10 INJECTION INTRAVENOUS at 10:55

## 2022-04-19 RX ADMIN — SODIUM CHLORIDE, POTASSIUM CHLORIDE, SODIUM LACTATE AND CALCIUM CHLORIDE: 600; 310; 30; 20 INJECTION, SOLUTION INTRAVENOUS at 10:22

## 2022-04-19 RX ADMIN — SODIUM CHLORIDE, POTASSIUM CHLORIDE, SODIUM LACTATE AND CALCIUM CHLORIDE: 600; 310; 30; 20 INJECTION, SOLUTION INTRAVENOUS at 09:04

## 2022-04-19 RX ADMIN — SODIUM CHLORIDE, POTASSIUM CHLORIDE, SODIUM LACTATE AND CALCIUM CHLORIDE: 600; 310; 30; 20 INJECTION, SOLUTION INTRAVENOUS at 10:56

## 2022-04-19 RX ADMIN — PHENYLEPHRINE HYDROCHLORIDE 100 MCG: 10 INJECTION INTRAVENOUS at 10:47

## 2022-04-19 ASSESSMENT — LIFESTYLE VARIABLES: SMOKING_STATUS: 1

## 2022-04-19 ASSESSMENT — PAIN SCALES - GENERAL
PAINLEVEL_OUTOF10: 0
PAINLEVEL_OUTOF10: 0

## 2022-04-19 ASSESSMENT — PAIN - FUNCTIONAL ASSESSMENT: PAIN_FUNCTIONAL_ASSESSMENT: 0-10

## 2022-04-19 NOTE — H&P
History and Physical      ASSESSMENT AND PLAN:    46y/F presents for endoscopic evaluation of persistent nausea, abdominal pain, and irregular bowel habits namely with constipation. PLAN:  Endoscopic evaluation with EGD and Colonoscopy today. HISTORY OF PRESENT ILLNESS:      Ms. Lenin Willams is a 46y/F who presents with complaints of post prandial nausea and abdominal pain. There is cramping and gas. Has occurred in the middle of the night and is not food specific.     Taking Pantoprazole 40mg daily x 1 week. Admits to history of chronic NSAID use for headaches. There is increased stress. Not a smoker and no alcohol. Does drink soda and caffeine.     Patient admits to occasional constipation. There is cramping associated. The constipation started with the nausea. Admits to a decreased appetite. Feels this is the cause of her constipation. Does not self medicate.     Has lost about 5 lbs since this started. There was one episode of vomiting. Past Medical History:        Diagnosis Date    Depression     Endometriosis     PONV (postoperative nausea and vomiting)     Vertigo      Past Surgical History:        Procedure Laterality Date    APPENDECTOMY  2009    CHOLECYSTECTOMY  1994    HAND SURGERY Right 12/14/2020    RIGHT DORSAL WRIST GANGLION EXCISION performed by Turner Sweet MD at [facility] 178  [phone]    pain in abdomen    TONSILLECTOMY      WISDOM TOOTH EXTRACTION       Social History:    TOBACCO:   reports that she has never smoked. She has never used smokeless tobacco.  ETOH:   reports no history of alcohol use. DRUGS:   reports no history of drug use.   Family History:       Problem Relation Age of Onset    Other Mother         hypertrophic cardiomyopathy, fibromyalgia, sarcoid    Heart Disease Father          Current Facility-Administered Medications:     lactated ringers infusion, , IntraVENous, Continuous, Christiano Fu MD, Last Rate: 10 mL/hr at 04/19/22 0904, New Bag at 04/19/22 0904    sodium chloride flush 0.9 % injection 5-40 mL, 5-40 mL, IntraVENous, 2 times per day, Kelly Iqbal MD    sodium chloride flush 0.9 % injection 5-40 mL, 5-40 mL, IntraVENous, PRN, Kelly Iqbal MD    0.9 % sodium chloride infusion, 25 mL, IntraVENous, PRN, Kelly Iqbal MD    perflutren lipid microspheres (DEFINITY) injection 1.65 mg, 1.5 mL, IntraVENous, ONCE PRN, Radha Grandchild, DO     Allergies:  Compazine [prochlorperazine maleate] and Codeine    ROS:  General: Patient denies n/v/f/c or weight loss. HEENT: Patient denies persistent postnasal drip, scleral icterus, drooling, persistent bleeding from nose/mouth. Resp: Patient denies SOB, wheezing, productive cough. Cards: Patient denies CP, palpitations, significant edema  GI: As above. Derm: Patient denies jaundice/rashes. Musc: Patient denies diffuse/irregular joint swelling or myalgias. PHYSICAL EXAM:  /60   Pulse 63   Temp 97.8 °F (36.6 °C) (Temporal)   Resp 16   Ht 5' 5\" (1.651 m)   Wt 159 lb (72.1 kg)   LMP 04/06/2022   SpO2 99%   BMI 26.46 kg/m²   Physical Exam:  General: Overall well-appearing, NAD  HEENT: PERRLA, EOMI, Anicteric sclera, MMM, no rhinorrhea  Cards: RRR, no LE edema  Resp: Breathing comfortably on room air, good air movement, no use of accessory muscles, no audible wheezing  Abdomen: soft, NT, ND. Extremities: Moves all extremities, no effusions or bruising.   Skin: No rashes or jaundice  Neuro: A&O x 3, CN grossly intact, non-focal exam              Electronically signed by Kelly Iqbal MD on 4/19/2022 at 10:26 AM

## 2022-04-19 NOTE — OP NOTE
Operative Note      Patient: Damian Diaz  YOB: 1975  MRN: [id number]    Date of Procedure: 4/19/2022    Pre-Op Diagnosis: Nausea, Constipation    Post-Op Diagnosis: Normal Exam       Procedure(s):  EGD BIOPSY  COLONOSCOPY    Surgeon(s):  Joseph Zepeda MD    Assistant:   Surgical Assistant: Riya Pal RN    Anesthesia: Monitor Anesthesia Care    Estimated Blood Loss (mL): Minimal    Complications: None    Specimens:   ID Type Source Tests Collected by Time Destination   A : BX SMALL BOWEL R/O CELIAC Tissue Duodenum SURGICAL PATHOLOGY Joseph Zepeda MD 4/19/2022 1032    B : BX ANTRUM R/O H. PYLORI Antrum Antrum SURGICAL PATHOLOGY Joseph Zepeda MD 4/19/2022 1035        Implants:  * No implants in log *      Drains: * No LDAs found *    Indications:  46y/F w/ history of persistent nausea, abdominal pain, and constipation/irregular bowel habits who presents for endoscopic evaluation. Findings:     EGD:  Normal esophagus. Normal stomach. H pylori biopsies obtained. Normal duodenum. Celiac biopsies obtained. Colon:  Normal terminal ileum. Normal colon. Detailed Description of Procedure:   Pre-Anesthesia Assessment:  Prior to the procedure, a history and physical was performed and patient medications and allergies were reviewed. The risks, benefits, complications, treatment options and expected outcomes were discussed with the patient. The possibilities of reaction to medication, pulmonary aspiration, perforation of the gastrointestinal tract, bleeding requiring transfusion or operation, respiratory failure requiring placement on a ventilator, and failure to diagnose a condition or identify lesions/polyps were discussed with the patient. All questions were answered and informed consent was obtained. Patient identification and proposed procedure were verified by the physician, the nurse, the anesthesiologist, the anesthetist, and the technician in the preprocedure area. Please see accompanying documentation. All staff in attendance for the performed procedure act in the role of the Chaperone. After I obtained informed consent, the scope was passed under direct vision. Throughout the procedure, the patient's blood pressure, pulse, and oxygen saturations were monitored continuously. The gastroscope was introduced through the mouth and advanced to the duodenum. The colonoscope was introduced through the anus and advanced to the terminal ileum. The procedure was performed without difficulty. The patient tolerated the procedure well. EGD:  The mucosa of the esophagus appeared normal.  No stricture, ulceration, or inflammation was appreciated. The Z-line was regular and found at 36cm. No hiatal hernia was appreciated. The mucosa of the body of the stomach appeared normal.   Mild inflammation with erythema and granularity was appreciated in the gastric antrum. No erosion or ulceration was appreciated in the antrum or pylorus. Biopsies for H pylori were obtained. The fundus and cardia were carefully inspected in retroflexion and showed normal mucosa. The mucosa of the duodenum appeared normal.  No stenosis, inflammation, erosion, or ulceration was appreciated. Colonoscopy:    Anticoagulants: None. Primary Family Member/s with Colon Cancer: None. The quality of the bowel preparation was adequate. The terminal ileum, the ileocecal valve, the appendiceal orifice, and the rectum via retroflex were photographed. Findings: On Perianal exam, no danica-anal disease or external hemorrhoids were appreciated. The mucosa of the terminal ileum appeared normal. No inflammation or ulceration was appreciated. The mucosa of the cecum, ascending colon, transverse colon, descending colon, sigmoid colon, and rectum was normal in appearance. No inflammation or ulceration was appreciated.      No diverticular disease was present on this exam.    No polyps or masses were appreciated on this exam.     No internal rectal hemorrhoids were present on retroflexion. Recommendations:  -The patient will be observed post procedure until all discharge criteria are met. -Patient has a contact number available for emergencies. The signs and symptoms of potential delayed complications were discussed with the patient.    -Return to normal activities tomorrow. -Written discharge instructions were provided to the patient.    -Await pathology results.  -Timeframe until next colonoscopy is 10 years.  -Clinic appointment to be scheduled 5/4/22.         Electronically signed by Tejinder Mann MD on 4/19/2022 at 11:10 AM

## 2022-04-19 NOTE — ANESTHESIA PRE PROCEDURE
Department of Anesthesiology  Preprocedure Note       Name:  Ivette Reid   Age:  42 y.o.  :  1975                                          MRN:  [id number]         Date:  2022      Surgeon: Smith Shea):  Elvira Ruvalcaba MD    Procedure: Procedure(s):  EGD ESOPHAGOGASTRODUODENOSCOPY  COLONOSCOPY    Medications prior to admission:   Prior to Admission medications    Medication Sig Start Date End Date Taking? Authorizing Provider   Multiple Vitamins-Minerals (THERAPEUTIC MULTIVITAMIN-MINERALS) tablet Take 1 tablet by mouth daily   Yes Historical Provider, MD   escitalopram (LEXAPRO) 20 MG tablet Take 1 tablet by mouth daily 3/24/22 9/20/22  Olu Cabrera DO   pantoprazole (PROTONIX) 40 MG tablet Take 1 tablet by mouth 2 times daily (before meals) 22   Kanwal Duty, APRN - CNP       Current medications:    Current Facility-Administered Medications   Medication Dose Route Frequency Provider Last Rate Last Admin    lactated ringers infusion   IntraVENous Continuous Vianey Coyle MD 10 mL/hr at 22 0904 New Bag at 22 0904    sodium chloride flush 0.9 % injection 5-40 mL  5-40 mL IntraVENous 2 times per day Elvira Ruvalcaba MD        sodium chloride flush 0.9 % injection 5-40 mL  5-40 mL IntraVENous PRN Elvira Ruvalcaba MD        0.9 % sodium chloride infusion  25 mL IntraVENous PRN Elvira Ruvalacba MD        perflutren lipid microspheres (DEFINITY) injection 1.65 mg  1.5 mL IntraVENous ONCE PRN Olu Cabrera DO           Allergies: Allergies   Allergen Reactions    Compazine [Prochlorperazine Maleate] Other (See Comments)     Patient states \"I go catatonic\"    Codeine Nausea And Vomiting       Problem List:    Patient Active Problem List   Diagnosis Code    Chronic bilateral low back pain without sciatica M54.50, G89.29    Dysmenorrhea N94.6    Endometriosis N80.9    Depression F32. A    Postoperative nausea R11.0, Z98.890    Vitamin D deficiency E55.9    Abnormal MRI R93.89  Dizziness R42    Low BP I95.9       Past Medical History:        Diagnosis Date    Depression     Endometriosis     PONV (postoperative nausea and vomiting)     Vertigo        Past Surgical History:        Procedure Laterality Date    APPENDECTOMY  2009    CHOLECYSTECTOMY  1994    HAND SURGERY Right 12/14/2020    RIGHT DORSAL WRIST GANGLION EXCISION performed by Sarahy Bahena MD at [facility] 178  [phone]    pain in abdomen    TONSILLECTOMY      WISDOM TOOTH EXTRACTION         Social History:    Social History     Tobacco Use    Smoking status: Never Smoker    Smokeless tobacco: Never Used   Substance Use Topics    Alcohol use: No                                Counseling given: Not Answered      Vital Signs (Current):   Vitals:    04/18/22 1454 04/19/22 0819   BP:  108/60   Pulse:  63   Resp:  16   Temp:  97.8 °F (36.6 °C)   TempSrc:  Temporal   SpO2:  99%   Weight: 162 lb (73.5 kg) 159 lb (72.1 kg)   Height: 5' 5\" (1.651 m) 5' 5\" (1.651 m)                                              BP Readings from Last 3 Encounters:   04/19/22 108/60   03/24/22 90/61   02/03/22 116/66       NPO Status: Time of last liquid consumption: 2330                        Time of last solid consumption: 1800                        Date of last liquid consumption: 04/18/22                        Date of last solid food consumption: 04/17/22    BMI:   Wt Readings from Last 3 Encounters:   04/19/22 159 lb (72.1 kg)   03/24/22 162 lb 9.6 oz (73.8 kg)   03/22/22 167 lb (75.8 kg)     Body mass index is 26.46 kg/m².     CBC:   Lab Results   Component Value Date    WBC 5.0 01/24/2022    RBC 3.58 01/24/2022    HGB 11.7 01/24/2022    HCT 33.5 01/24/2022    MCV 93.6 01/24/2022    RDW 12.0 01/24/2022     01/24/2022       CMP:   Lab Results   Component Value Date     01/24/2022    K 4.0 01/24/2022     01/24/2022    CO2 26 01/24/2022    BUN 6 01/24/2022    CREATININE 1.0 01/24/2022    GFRAA >60 01/24/2022    LABGLOM >60 01/24/2022    GLUCOSE 90 01/24/2022    PROT 6.4 10/07/2021    CALCIUM 8.6 01/24/2022    BILITOT 0.4 10/07/2021    ALKPHOS 85 10/07/2021    AST 23 10/07/2021    ALT 24 10/07/2021       POC Tests: No results for input(s): POCGLU, POCNA, POCK, POCCL, POCBUN, POCHEMO, POCHCT in the last 72 hours. Coags:   Lab Results   Component Value Date    PROTIME 11.9 01/23/2022    INR 1.1 01/23/2022    APTT 31.9 01/23/2022       HCG (If Applicable):   Lab Results   Component Value Date    PREGTESTUR NEGATIVE 04/19/2022        ABGs: No results found for: PHART, PO2ART, UIP2IXB, JHB6PSU, BEART, H0OHSPNQ     Type & Screen (If Applicable):  No results found for: LABABO, LABRH    Drug/Infectious Status (If Applicable):  No results found for: HIV, HEPCAB    COVID-19 Screening (If Applicable):   Lab Results   Component Value Date    COVID19 Not Detected 12/09/2020         Anesthesia Evaluation  Patient summary reviewed and Nursing notes reviewed   history of anesthetic complications: PONV. Airway: Mallampati: II  TM distance: >3 FB   Neck ROM: full  Mouth opening: > = 3 FB Dental: normal exam         Pulmonary: breath sounds clear to auscultation  (+) current smoker                           Cardiovascular:Negative CV ROS  Exercise tolerance: good (>4 METS),           Rhythm: regular  Rate: normal           Beta Blocker:  Not on Beta Blocker         Neuro/Psych:   (+) neuromuscular disease:, psychiatric history:depression/anxiety              ROS comment: HNP  R sciatica GI/Hepatic/Renal: Neg GI/Hepatic/Renal ROS            Endo/Other:    (+) : arthritis:., .    (-) blood dyscrasia, no electrolyte abnormalities               Abdominal:             Vascular: Other Findings:               Anesthesia Plan      MAC     ASA 2       Induction: intravenous. Anesthetic plan and risks discussed with patient. Plan discussed with CRNA.               DOS STAFF ADDENDUM:    Pt seen and examined, chart reviewed (including anesthesia, drug and allergy history). Anesthetic plan, risks, benefits, alternatives, and personnel involved discussed with patient. Patient verbalized an understanding and agrees to proceed. Plan discussed with care team members and agreed upon.     Annalisa Larsen MD  Staff Anesthesiologist  10:18 AM    Annalisa Larsen MD   4/19/2022

## 2022-04-19 NOTE — ANESTHESIA POSTPROCEDURE EVALUATION
Department of Anesthesiology  Postprocedure Note    Patient: Pablo Sarabia  MRN: [id number]  YOB: 1975  Date of evaluation: 4/19/2022  Time:  12:36 PM     Procedure Summary     Date: 04/19/22 Room / Location: [address] [place] [address] / [address] [place] [address]    Anesthesia Start: 1022 Anesthesia Stop: 1114    Procedures:       EGD BIOPSY (N/A )      COLONOSCOPY (N/A ) Diagnosis: (Monica Gibbs)    Surgeons: Jacqueline Gordon MD Responsible Provider: Erica Linares MD    Anesthesia Type: MAC ASA Status: 2          Anesthesia Type: MAC    Mahesh Phase I: Mahesh Score: 10    Mahesh Phase II: Mahesh Score: 10    Last vitals: Reviewed and per EMR flowsheets.        Anesthesia Post Evaluation    Patient location during evaluation: PACU  Patient participation: complete - patient participated  Level of consciousness: awake  Airway patency: patent  Nausea & Vomiting: no nausea and no vomiting  Complications: no  Cardiovascular status: hemodynamically stable  Respiratory status: acceptable  Hydration status: euvolemic

## 2022-04-25 ENCOUNTER — TELEPHONE (OUTPATIENT)
Dept: GASTROENTEROLOGY | Age: 47
End: 2022-04-25

## 2022-04-25 NOTE — TELEPHONE ENCOUNTER
Left message for patient stating that the appointment scheduled on 5/4 with Dr Doug Loaiza will be rescheduled on 5/3 with NP

## 2022-05-17 ENCOUNTER — OFFICE VISIT (OUTPATIENT)
Dept: GASTROENTEROLOGY | Age: 47
End: 2022-05-17
Payer: COMMERCIAL

## 2022-05-17 VITALS
DIASTOLIC BLOOD PRESSURE: 61 MMHG | SYSTOLIC BLOOD PRESSURE: 107 MMHG | HEART RATE: 68 BPM | RESPIRATION RATE: 16 BRPM | TEMPERATURE: 97.4 F | HEIGHT: 65 IN | WEIGHT: 161.8 LBS | BODY MASS INDEX: 26.96 KG/M2 | OXYGEN SATURATION: 99 %

## 2022-05-17 DIAGNOSIS — R11.0 NAUSEA: Primary | ICD-10-CM

## 2022-05-17 DIAGNOSIS — K59.00 CONSTIPATION, UNSPECIFIED CONSTIPATION TYPE: ICD-10-CM

## 2022-05-17 PROCEDURE — 99212 OFFICE O/P EST SF 10 MIN: CPT | Performed by: NURSE PRACTITIONER

## 2022-05-17 NOTE — PROGRESS NOTES
Lance Mendoza (:  1975) is a 42 y.o. female, here for evaluation of the following chief complaint(s):  Follow-up and Discuss Medications (Patient would like to know if you will keep her on the protonix for a long time if so she would like a 90 day sent to her mail away when she runs out )      SUBJECTIVE/OBJECTIVE:  HPI:    Alicia Panda is a very pleasant 42year old female that presents today for a follow up on EGD and colonoscopy.       EGD:  Normal esophagus. Normal Z line at 36cm. Normal stomach. H pylori biopsies obtained. Normal duodenum. Celiac biopsies obtained.     Colon:  Normal terminal ileum. Normal colon. No internal or external hemorrhoids      Diagnosis:   A.  Small intestine, biopsy: Duodenal mucosa with intact villous   architecture, no significant pathologic abnormality identified. Features diagnostic of celiac disease are not identified in the tissue   sample. B.  Gastric antrum, biopsy: Mild chronic gastritis. Immunostain negative for Helicobacter pylori organisms. Negative for intestinal metaplasia and dysplasia. Patient continues to complain of nausea intermittently. Feels it may be related to headaches. Admits to feeling dizzy also. Has a neurology appt tomorrow. Patient started eating more salads. Still does not feel her bowel movements are regular. No abdominal pain or BRBPR.        ROS:  General: Patient denies n/v/f/c or weight loss. HEENT: Patient denies persistent postnasal drip, scleral icterus, drooling, persistent bleeding from nose/mouth. Resp: Patient denies SOB, wheezing, productive cough. Cards: Patient denies CP, palpitations, significant edema  GI: As above. Derm: Patient denies jaundice/rashes. Musc: Patient denies diffuse/irregular joint swelling or myalgias.       Objective   Wt Readings from Last 3 Encounters:   22 161 lb 12.8 oz (73.4 kg)   22 159 lb (72.1 kg)   22 162 lb 9.6 oz (73.8 kg)     Temp Readings from Last 3 Encounters:   05/17/22 97.4 °F (36.3 °C) (Temporal)   04/19/22 97.8 °F (36.6 °C) (Infrared)   03/24/22 97.3 °F (36.3 °C) (Temporal)     BP Readings from Last 3 Encounters:   05/17/22 107/61   04/19/22 (!) 112/59   04/19/22 104/63     Pulse Readings from Last 3 Encounters:   05/17/22 68   04/19/22 71   03/24/22 67        Physical Exam    Past Medical History:   Diagnosis Date    Depression     Endometriosis     PONV (postoperative nausea and vomiting)     Vertigo       Past Surgical History:   Procedure Laterality Date    APPENDECTOMY  2009    CHOLECYSTECTOMY  1994    COLONOSCOPY N/A 4/19/2022    COLONOSCOPY performed by Chikis Gonzalez MD at [address] 12/14/2020    RIGHT DORSAL WRIST GANGLION EXCISION performed by Brenda Luz MD at [facility] 178  [id number]    pain in abdomen    TONSILLECTOMY      UPPER GASTROINTESTINAL ENDOSCOPY N/A 4/19/2022    EGD BIOPSY performed by Chikis Gonzalez MD at [address]        Family History   Problem Relation Age of Onset    Other Mother         hypertrophic cardiomyopathy, fibromyalgia, sarcoid    Heart Disease Father         Lab Results   Component Value Date    WBC 5.0 01/24/2022    HGB 11.7 01/24/2022    HCT 33.5 (L) 01/24/2022    MCV 93.6 01/24/2022     01/24/2022      Lab Results   Component Value Date     01/24/2022    K 4.0 01/24/2022     (H) 01/24/2022    CO2 26 01/24/2022    BUN 6 01/24/2022    CREATININE 1.0 01/24/2022    GLUCOSE 90 01/24/2022    CALCIUM 8.6 01/24/2022    PROT 6.4 10/07/2021    LABALBU 4.2 10/07/2021    BILITOT 0.4 10/07/2021    ALKPHOS 85 10/07/2021    AST 23 10/07/2021    ALT 24 10/07/2021    LABGLOM >60 01/24/2022    GFRAA >60 01/24/2022                       ASSESSMENT/PLAN:    1. Nausea    -Upper endoscopy reviewed with patient today.  -Patient will attempt a trial wean of Omeprazole.   Instructions given  -Patient has a follow up with neurology tomorrow regarding nausea and headaches    2. Constipation, unspecified constipation type    -Colonoscopy reviewed with patient today  -Patient will trial stool softeners/Miralax as needed      Return if symptoms worsen or fail to improve. An electronic signature was used to authenticate this note.     --Isaura Walker, ROYCE - CNP

## 2022-05-18 ENCOUNTER — OFFICE VISIT (OUTPATIENT)
Dept: NEUROLOGY | Age: 47
End: 2022-05-18
Payer: COMMERCIAL

## 2022-05-18 VITALS
HEIGHT: 65 IN | SYSTOLIC BLOOD PRESSURE: 90 MMHG | DIASTOLIC BLOOD PRESSURE: 60 MMHG | WEIGHT: 161 LBS | BODY MASS INDEX: 26.82 KG/M2

## 2022-05-18 DIAGNOSIS — R42 PERSISTENT POSTURAL-PERCEPTUAL DIZZINESS: ICD-10-CM

## 2022-05-18 DIAGNOSIS — R42 DIZZINESS AND GIDDINESS: ICD-10-CM

## 2022-05-18 DIAGNOSIS — H93.232 HYPERACUSIS OF LEFT EAR: ICD-10-CM

## 2022-05-18 DIAGNOSIS — H81.93 VESTIBULOPATHY OF BOTH EARS: Primary | ICD-10-CM

## 2022-05-18 DIAGNOSIS — G43.809 MIGRAINE VARIANT WITH HEADACHE: Chronic | ICD-10-CM

## 2022-05-18 PROBLEM — R51.9 CEPHALGIA: Chronic | Status: ACTIVE | Noted: 2022-05-18

## 2022-05-18 PROBLEM — H81.8X9 PERSISTENT POSTURAL-PERCEPTUAL DIZZINESS: Status: ACTIVE | Noted: 2022-05-18

## 2022-05-18 PROBLEM — I95.9 LOW BLOOD PRESSURE: Status: ACTIVE | Noted: 2022-05-18

## 2022-05-18 PROBLEM — H81.90 PERIPHERAL VESTIBULOPATHY: Chronic | Status: ACTIVE | Noted: 2022-05-18

## 2022-05-18 PROCEDURE — 99203 OFFICE O/P NEW LOW 30 MIN: CPT | Performed by: PSYCHIATRY & NEUROLOGY

## 2022-05-18 ASSESSMENT — ENCOUNTER SYMPTOMS
EYES NEGATIVE: 1
GASTROINTESTINAL NEGATIVE: 1
ALLERGIC/IMMUNOLOGIC NEGATIVE: 1
RESPIRATORY NEGATIVE: 1

## 2022-05-18 NOTE — PROGRESS NOTES
Neurology Consult Note:    Patient: Ivette Reid  : 1975  Date: 22  Referring provider: ROYCE Avila -*    Referral to Neurology: Chronic episodic peripheral vestibulopathy, dizziness, hyperacusis of left ear hearing loss. Cc: Dizziness and lightheadedness. History of peripheral vertigo x 4 mos. Dear ROYCE Avila -*     Thank you for your referral of Ivette Reid for chronic dizziness and both subjective/objective vertigo symptoms x 4 mos. with head movement and postural changes. 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 ago, stood up and then and experienced the room spinning or turning and had sensation of movement consistent with subjective vertigo symptoms, She sometimes feels as though  her Maridee Stalling is swimming\". Turning her head, nodding head movements, sitting, any movement, or when lying down may provoke worsening of her vertiginous symptoms. She has experieced nausea but no projectile vomiting with dizziness and reports a negative GI workup. Movement in general makes her symptoms worse. When she walks even, she feels as though she is \"being pulled\" in either direction. She has hearing loss of her left ear. She reports hyperacusis. He denies tinnitus she uses headphones to \"help block the loud noises\" which tend to bother her. She reports never being diagnosed with migraines. She has experienced chronic episodic headaches since childhood however not associated with dizziness or vertigo. She also wears night vision glasses because lights or bright light bothers her eyes. She recalls taking Exedrin Migraine in the past 3-4 x/wk for headaches but never was diagnosed with migraine and uses only prn Tylenol presently as was concerned about gastritis. She reports headaches most of the days of the week. She reports headache pain localized on the top of her head without hemicranial preference.  She describes a pressure-like aching pain but  Dysmenorrhea    Endometriosis    Depression    Postoperative nausea    Vitamin D deficiency    Abnormal MRI    Dizziness    Low BP    Hyperacusis of left ear    Cephalgia    Peripheral vestibulopathy    Migraine variant with headache    Low blood pressure    Persistent postural-perceptual dizziness       Past Medical History:   Diagnosis Date    Cephalgia 5/18/2022    Depression     Endometriosis     Hyperacusis of left ear 5/18/2022    Reported by pt.     Low blood pressure 5/18/2022    History of low BP    Migraine variant with headache 5/18/2022    Childhood onset    Peripheral vestibulopathy 5/18/2022    Persistent postural-perceptual dizziness 5/18/2022    PONV (postoperative nausea and vomiting)     Vertigo        Past Surgical History:   Procedure Laterality Date    APPENDECTOMY  2009    CHOLECYSTECTOMY  1994    COLONOSCOPY N/A 4/19/2022    COLONOSCOPY performed by Omar Chaudhary MD at [address] 12/14/2020    RIGHT DORSAL WRIST GANGLION EXCISION performed by David Carlson MD at [facility] 178  [id number]    pain in abdomen    TONSILLECTOMY      UPPER GASTROINTESTINAL ENDOSCOPY N/A 4/19/2022    [facility]GD [facility]SY [facility]         Family History   Problem Relation Age of Onset    Other Mother         hypertrophic cardiomyopathy, fibromyalgia, sarcoid    Heart Disease Father        Social History     Socioeconomic History    Marital status:      Spouse name: Not on file    Number of children: Not on file    Years of education: Not on file    Highest education level: Not on file   Occupational History    Not on file   Tobacco Use    Smoking status: Never Smoker    Smokeless tobacco: Never Used   Vaping Use    Vaping Use: Never used   Substance and Sexual Activity    Alcohol use: No    Drug use: No    Sexual activity: Not on file   Other Topics Concern    Not on file   Social History Narrative    Not on file     Social Determinants of Health     Financial Resource Strain: Low Risk     Difficulty of Paying Living Expenses: Not hard at all   Food Insecurity: No Food Insecurity    Worried About Running Out of Food in the Last Year: Never true    [address] in the Last Year: Never true   Transportation Needs:     Lack of Transportation (Medical): Not on file    Lack of Transportation (Non-Medical): Not on file   Physical Activity:     Days of Exercise per Week: Not on file    Minutes of Exercise per Session: Not on file   Stress:     Feeling of Stress : Not on file   Social Connections:     Frequency of Communication with Friends and Family: Not on file    Frequency of Social Gatherings with Friends and Family: Not on file    Attends Restoration[employer] Services: Not on file    Active Member of [address] [place] [address] or Organizations: Not on file    Attends Club or Organization Meetings: Not on file    Marital Status: Not on file   Intimate Partner Violence:     Fear of Current or Ex-Partner: Not on file    Emotionally Abused: Not on file    Physically Abused: Not on file    Sexually Abused: Not on file   Housing Stability:     Unable to Pay for Housing in the Last Year: Not on file    Number of [place] in the Last Year: Not on file    Unstable Housing in the Last Year: Not on file     Review of Systems   Constitutional: Negative. HENT: Negative. Eyes: Negative. Respiratory: Negative. Cardiovascular: Negative. Gastrointestinal: Negative. Endocrine: Negative. Genitourinary: Negative. Musculoskeletal: Negative. Skin: Negative. Allergic/Immunologic: Negative. Neurological: Positive for dizziness and light-headedness. Hematological: Negative. Psychiatric/Behavioral: The patient is nervous/anxious. All other systems reviewed and are negative.     Neurologic Exam:  BP 90/60 (Site: Left Upper Arm, Position: Sitting, Cuff Size: Medium Adult) Ht 5' 5\" (1.651 m)   Wt 161 lb (73 kg)   LMP 05/05/2022 (Exact Date)   BMI 26.79 kg/m²   General appearance: Alert, cooperative, anxious, well-nourished, groomed, seated in the exam room, no acute distress  HEENT: Normocephalic/atraumatic. Neck: Supple, no bruits or adventitious sounds heard  Cardiac: RRR  Respiratory: grossly clear  Extremities: No edema, erythema or cyanosis  Skin: No lesions or rashes  Musculoskeletal: No fasciculations or tremors, no foot drop  Mental Status: Alert, oriented x3  Speech/Language: Clear, fluent  Attention span/Concentration: Grossly intact  Affect/Mood: Anxious  Insight/Judgement: Fairly good     Fund of Knowledge/Current events: Grossly intact  CN II-XII:     Pupils: Equal, reactive to light, 2.5 mm     EOM's: Full with minimal self-limited nystagmus or nystagmoid jerks in the left horizontal direction, nonsustained and no nystagmus in the primary position of gaze. Visual Fields: Full to confrontation. Fundi: Miosis to light, grossly unremarkable  CN V: normal V1-V3  CN VII: No facial droop  CN VIII: Hearing grossly intact  CN IX-XII: Tongue midline  SCM/Trapezii: 5/5 power  Motor: 5/5 power in the upper and lower extremities without tremor or drift and normal motor tone. Intact fine motor function of both hands, symmetric. DTR's: 1-2+ and symmetric in the upper and lower extremities, no ankle clonus, plantar responses are flexor. Sensory: Intact subjectively to light touch and sharp stick testing throughout. Coordination/Gait: Gross limb dysmetria on finger-to-nose testing, no truncal or cerebellar gait ataxia, two-step turns. Assessment/Plan:  1. Probable chronic peripheral vestibulopathy consistent with BPPV, medically improved since onset. 2.  Possible presyncope postural dizziness dizziness and lightheadedness symptoms. 3.  History of hyperacusis of left ear and hearing loss.   4.  History of chronic intermittent tension type headaches, caffeine overuse, possible migraine variant, but not directly associated with peripheral vestibulopathy onset or recurrent vertiginous symptoms. 5.  Patient information was provided on dizziness, vertigo. 6.  Her MR brain scan reviewed shows minimal nonspecific chronic microangiopathy of the subcortical frontal regions and does not suggest a specific etiology for her vertigo symptoms, but may be seen in a significant number of the general population or more commonly in the migraine population. Patient did not want to trial a prophylactic migraine medication due to concern for side effects that may worsen her \"brain fog\" symptom she also reports a low blood pressure history is taking Celexa for anxiety/depression. Sincerely,      Coby Lemons MD  Neurology & Clinical Neurophysiology    This note was created using speech recognition transcription software. Despite proofreading, there may be several typographical errors present that may affect the meaning of the content. Please call with any questions. Note: A total time of 40 mins. was spent on the date of service in preparation for this visit, which included face-to-face patient care, completing clinical documentation, counseling and coordination of care based on clinical impression, neurologic diagnosis, review of pertinent imaging studies, test results, implementation and discussion of treatment plan, risk factor reduction and patient and/or family education.

## 2022-05-19 ENCOUNTER — TELEPHONE (OUTPATIENT)
Dept: ENT CLINIC | Age: 47
End: 2022-05-19

## 2022-05-19 NOTE — TELEPHONE ENCOUNTER
Patient called in today regarding her recent visit with neurology (Dr. Dora Mc). Patient states she was referred there by Caroline Hall. Patient states that she feels the appointment with neuro was useless and that Dr. Larry Trejo told her she couldn't do anything for her. Patient requesting a return call on how to proceed next.      Electronically signed by Keyur Landers on 5/19/2022 at 1:36 PM

## 2022-05-24 ENCOUNTER — HOSPITAL ENCOUNTER (OUTPATIENT)
Dept: GENERAL RADIOLOGY | Age: 47
Discharge: HOME | End: 2022-05-26
Payer: COMMERCIAL

## 2022-05-24 VITALS — BODY MASS INDEX: 26.66 KG/M2 | WEIGHT: 160 LBS | HEIGHT: 65 IN

## 2022-05-24 DIAGNOSIS — Z12.31 SCREENING MAMMOGRAM, ENCOUNTER FOR: ICD-10-CM

## 2022-05-24 PROCEDURE — 77063 BREAST TOMOSYNTHESIS BI: CPT

## 2022-06-02 ENCOUNTER — TELEPHONE (OUTPATIENT)
Dept: FAMILY MEDICINE CLINIC | Age: 47
End: 2022-06-02

## 2022-06-02 ENCOUNTER — OFFICE VISIT (OUTPATIENT)
Dept: FAMILY MEDICINE CLINIC | Age: 47
End: 2022-06-02
Payer: COMMERCIAL

## 2022-06-02 VITALS
RESPIRATION RATE: 18 BRPM | SYSTOLIC BLOOD PRESSURE: 95 MMHG | OXYGEN SATURATION: 98 % | HEIGHT: 65 IN | BODY MASS INDEX: 26.56 KG/M2 | HEART RATE: 69 BPM | DIASTOLIC BLOOD PRESSURE: 60 MMHG | WEIGHT: 159.4 LBS | TEMPERATURE: 98.3 F

## 2022-06-02 DIAGNOSIS — I95.0 IDIOPATHIC HYPOTENSION: ICD-10-CM

## 2022-06-02 DIAGNOSIS — R42 PERSISTENT POSTURAL-PERCEPTUAL DIZZINESS: Primary | ICD-10-CM

## 2022-06-02 PROBLEM — R93.89 ABNORMAL MRI: Status: RESOLVED | Noted: 2022-01-25 | Resolved: 2022-06-02

## 2022-06-02 PROBLEM — R51.9 CEPHALGIA: Chronic | Status: RESOLVED | Noted: 2022-05-18 | Resolved: 2022-06-02

## 2022-06-02 PROBLEM — H93.232 HYPERACUSIS OF LEFT EAR: Status: RESOLVED | Noted: 2022-05-18 | Resolved: 2022-06-02

## 2022-06-02 PROBLEM — H81.90 PERIPHERAL VESTIBULOPATHY: Chronic | Status: RESOLVED | Noted: 2022-05-18 | Resolved: 2022-06-02

## 2022-06-02 PROBLEM — I95.9 LOW BLOOD PRESSURE: Status: RESOLVED | Noted: 2022-05-18 | Resolved: 2022-06-02

## 2022-06-02 PROCEDURE — 99214 OFFICE O/P EST MOD 30 MIN: CPT | Performed by: STUDENT IN AN ORGANIZED HEALTH CARE EDUCATION/TRAINING PROGRAM

## 2022-06-02 RX ORDER — BUPROPION HYDROCHLORIDE 150 MG/1
150 TABLET ORAL EVERY MORNING
Qty: 90 TABLET | Refills: 1 | Status: SHIPPED
Start: 2022-06-02 | End: 2022-08-26

## 2022-06-02 SDOH — ECONOMIC STABILITY: HOUSING INSECURITY: IN THE LAST 12 MONTHS, HOW MANY PLACES HAVE YOU LIVED?: 1

## 2022-06-02 SDOH — ECONOMIC STABILITY: TRANSPORTATION INSECURITY
IN THE PAST 12 MONTHS, HAS LACK OF TRANSPORTATION KEPT YOU FROM MEETINGS, WORK, OR FROM GETTING THINGS NEEDED FOR DAILY LIVING?: NO

## 2022-06-02 SDOH — ECONOMIC STABILITY: INCOME INSECURITY: IN THE LAST 12 MONTHS, WAS THERE A TIME WHEN YOU WERE NOT ABLE TO PAY THE MORTGAGE OR RENT ON TIME?: NO

## 2022-06-02 SDOH — HEALTH STABILITY: PHYSICAL HEALTH: ON AVERAGE, HOW MANY MINUTES DO YOU ENGAGE IN EXERCISE AT THIS LEVEL?: 0 MIN

## 2022-06-02 SDOH — ECONOMIC STABILITY: TRANSPORTATION INSECURITY
IN THE PAST 12 MONTHS, HAS THE LACK OF TRANSPORTATION KEPT YOU FROM MEDICAL APPOINTMENTS OR FROM GETTING MEDICATIONS?: NO

## 2022-06-02 SDOH — ECONOMIC STABILITY: HOUSING INSECURITY
IN THE LAST 12 MONTHS, WAS THERE A TIME WHEN YOU DID NOT HAVE A STEADY PLACE TO SLEEP OR SLEPT IN A SHELTER (INCLUDING NOW)?: NO

## 2022-06-02 SDOH — HEALTH STABILITY: PHYSICAL HEALTH: ON AVERAGE, HOW MANY DAYS PER WEEK DO YOU ENGAGE IN MODERATE TO STRENUOUS EXERCISE (LIKE A BRISK WALK)?: 0 DAYS

## 2022-06-02 ASSESSMENT — SOCIAL DETERMINANTS OF HEALTH (SDOH)
DO YOU BELONG TO ANY CLUBS OR ORGANIZATIONS SUCH AS CHURCH GROUPS UNIONS, FRATERNAL OR ATHLETIC GROUPS, OR SCHOOL GROUPS?: YES
IN A TYPICAL WEEK, HOW MANY TIMES DO YOU TALK ON THE PHONE WITH FAMILY, FRIENDS, OR NEIGHBORS?: MORE THAN THREE TIMES A WEEK
WITHIN THE LAST YEAR, HAVE YOU BEEN KICKED, HIT, SLAPPED, OR OTHERWISE PHYSICALLY HURT BY YOUR PARTNER OR EX-PARTNER?: NO
HOW OFTEN DO YOU GET TOGETHER WITH FRIENDS OR RELATIVES?: ONCE A WEEK
HOW OFTEN DO YOU ATTENT MEETINGS OF THE CLUB OR ORGANIZATION YOU BELONG TO?: MORE THAN 4 TIMES PER YEAR
HOW OFTEN DO YOU ATTEND CHURCH OR RELIGIOUS SERVICES?: NEVER
WITHIN THE LAST YEAR, HAVE YOU BEEN AFRAID OF YOUR PARTNER OR EX-PARTNER?: NO
WITHIN THE LAST YEAR, HAVE YOU BEEN HUMILIATED OR EMOTIONALLY ABUSED IN OTHER WAYS BY YOUR PARTNER OR EX-PARTNER?: NO
WITHIN THE LAST YEAR, HAVE TO BEEN RAPED OR FORCED TO HAVE ANY KIND OF SEXUAL ACTIVITY BY YOUR PARTNER OR EX-PARTNER?: NO

## 2022-06-02 ASSESSMENT — PATIENT HEALTH QUESTIONNAIRE - PHQ9
6. FEELING BAD ABOUT YOURSELF - OR THAT YOU ARE A FAILURE OR HAVE LET YOURSELF OR YOUR FAMILY DOWN: 1
SUM OF ALL RESPONSES TO PHQ QUESTIONS 1-9: 10
10. IF YOU CHECKED OFF ANY PROBLEMS, HOW DIFFICULT HAVE THESE PROBLEMS MADE IT FOR YOU TO DO YOUR WORK, TAKE CARE OF THINGS AT HOME, OR GET ALONG WITH OTHER PEOPLE: 1
2. FEELING DOWN, DEPRESSED OR HOPELESS: 1
8. MOVING OR SPEAKING SO SLOWLY THAT OTHER PEOPLE COULD HAVE NOTICED. OR THE OPPOSITE, BEING SO FIGETY OR RESTLESS THAT YOU HAVE BEEN MOVING AROUND A LOT MORE THAN USUAL: 0
5. POOR APPETITE OR OVEREATING: 0
SUM OF ALL RESPONSES TO PHQ QUESTIONS 1-9: 10
SUM OF ALL RESPONSES TO PHQ9 QUESTIONS 1 & 2: 2
7. TROUBLE CONCENTRATING ON THINGS, SUCH AS READING THE NEWSPAPER OR WATCHING TELEVISION: 3
SUM OF ALL RESPONSES TO PHQ QUESTIONS 1-9: 10
4. FEELING TIRED OR HAVING LITTLE ENERGY: 3
1. LITTLE INTEREST OR PLEASURE IN DOING THINGS: 1
3. TROUBLE FALLING OR STAYING ASLEEP: 1
9. THOUGHTS THAT YOU WOULD BE BETTER OFF DEAD, OR OF HURTING YOURSELF: 0
SUM OF ALL RESPONSES TO PHQ QUESTIONS 1-9: 10

## 2022-06-02 ASSESSMENT — LIFESTYLE VARIABLES: HOW OFTEN DO YOU HAVE A DRINK CONTAINING ALCOHOL: NEVER

## 2022-06-02 NOTE — PROGRESS NOTES
Patient:  Chelo Alvarenga 42 y.o. female     06/02/22      Chiefcomplaint:   Chief Complaint   Patient presents with    Headache     follow up    Dizziness     Problems and Overview   Dizziness  CT scan and MRI negative  Neuro during hospitalization - juli hallpike positive on left. consideration of otolith. ENT - vng neg, sent to neuro. Saw neuro as outpt, migraine possible +/- bppv. MRI findings migraine vs idopathic  Still having symptoms. Bmp, cbc, thyroid labs wnl    Echo ordered previous    Patient Active Problem List    Diagnosis Date Noted    Persistent postural-perceptual dizziness 05/18/2022     Priority: Medium     Evaluated by ENT, Audiology with [facility] (no abnormality noted), neurology ( migraine variant vs bppv), MRI as noted below. MRI  Multifocal punctate foci of subtle increased T2/FLAIR signal within the   bifrontal cerebral white matter.  Differential diagnostic considerations   include association with minimal microvascular ischemic disease, migraine   headaches, posttraumatic sequela, vasculitis/inflammatory disease.  Migraine variant with headache 05/18/2022     Childhood onset      Low BP 03/24/2022     Persistent low normal bp, asx.        Vitamin D deficiency 09/02/2021    Chronic bilateral low back pain without sciatica 10/21/2011    Endometriosis 05/03/2010    Depression 09/01/1999      Prevention:  Health Maintenance Due   Topic Date Due    HIV screen  Never done    Hepatitis C screen  Never done    COVID-19 Vaccine (3 - Booster for Pfizer series) 10/06/2021      Meds prior:  Current Outpatient Medications   Medication Instructions    buPROPion (WELLBUTRIN XL) 150 mg, Oral, EVERY MORNING    escitalopram (LEXAPRO) 20 mg, Oral, DAILY    Multiple Vitamins-Minerals (THERAPEUTIC MULTIVITAMIN-MINERALS) tablet 1 tablet, Oral, DAILY    pantoprazole (PROTONIX) 40 mg, Oral, 2 TIMES DAILY BEFORE MEALS      Physical Exam   Vitals: BP 95/60 (Site: Left Upper Arm, Position: Sitting, Cuff Size: Large Adult)   Pulse 69   Temp 98.3 °F (36.8 °C)   Resp 18   Ht 5' 5\" (1.651 m)   Wt 159 lb 6.4 oz (72.3 kg)   LMP 05/05/2022 (Exact Date)   SpO2 98%   BMI 26.53 kg/m²   General Appearance: Alert, oriented, no acute distress  HEENT: No scleral icterus. No visible discharge from eyes  Neck: Not rigid. No visible masses. No cervical vertebral tenderness or step-off. Reluctant to move head quickly. Chest wall/Lung: Clear to auscultation bilaterally,  respirations unlabored. No ronchi/wheezing/rales  Heart: RRR, no murmur  Extremities:  No edema  Skin: No rashes. No jaundice  Neuro: Alert and oriented        Psych: Appropriate mood and appropriate affect  Assessment and Plan   Persistent postural-perceptual dizziness  Plan continue wean off of Lexapro over the course of about a month and a half. Start Wellbutrin as an alternative. Tolerated in the past.  Plan to start vestibular physical therapy. They will call our [facility] for consideration and let us know if that is where they would like to be referred. Return to care 1 month. Other considerations are prophylactic therapy for migraine but we will hold off on making changes to Lexapro. Idiopathic hypotension  Consideration whether this is impacting her current symptoms. This is a longstanding concern even prior to onset of the symptoms. Consideration of tilt table testing. Consideration of midodrine or increased salt intake for trial to see if this relieves symptoms in the future if other evaluation and treatment does not result in improvement. No follow-ups on file. Janette Li, DO     This document may have been prepared at least partially through the use of voice recognition software. Although effort is taken to assure the accuracy of this document, it is possible that grammatical, syntax,  or spelling errors may occur.

## 2022-06-02 NOTE — TELEPHONE ENCOUNTER
Pt called that she scheduled to [facility], [address] [place] [address] [phone]. They are asking for a referral and they need more information on what the patient needs so they can address her needs better and help with the therapy.

## 2022-07-07 ENCOUNTER — OFFICE VISIT (OUTPATIENT)
Dept: FAMILY MEDICINE CLINIC | Age: 47
End: 2022-07-07
Payer: COMMERCIAL

## 2022-07-07 VITALS
HEART RATE: 85 BPM | DIASTOLIC BLOOD PRESSURE: 62 MMHG | HEIGHT: 65 IN | TEMPERATURE: 96.9 F | WEIGHT: 157.2 LBS | SYSTOLIC BLOOD PRESSURE: 96 MMHG | RESPIRATION RATE: 20 BRPM | OXYGEN SATURATION: 99 % | BODY MASS INDEX: 26.19 KG/M2

## 2022-07-07 DIAGNOSIS — I95.9 HYPOTENSION, UNSPECIFIED HYPOTENSION TYPE: ICD-10-CM

## 2022-07-07 DIAGNOSIS — R42 PERSISTENT POSTURAL-PERCEPTUAL DIZZINESS: Primary | ICD-10-CM

## 2022-07-07 DIAGNOSIS — R42 DIZZINESS: ICD-10-CM

## 2022-07-07 PROCEDURE — 99214 OFFICE O/P EST MOD 30 MIN: CPT | Performed by: STUDENT IN AN ORGANIZED HEALTH CARE EDUCATION/TRAINING PROGRAM

## 2022-07-07 NOTE — PROGRESS NOTES
EVERY MORNING    escitalopram (LEXAPRO) 20 mg, Oral, DAILY    Multiple Vitamins-Minerals (THERAPEUTIC MULTIVITAMIN-MINERALS) tablet 1 tablet, Oral, DAILY      Physical Exam   Vitals: BP 96/62 (Site: Left Upper Arm, Position: Sitting, Cuff Size: Medium Adult)   Pulse 85   Temp 96.9 °F (36.1 °C) (Temporal)   Resp 20   Ht 5' 5\" (1.651 m)   Wt 157 lb 3.2 oz (71.3 kg)   SpO2 99%   BMI 26.16 kg/m²   General Appearance: Alert, oriented, no acute distress  HEENT: No scleral icterus. No visible discharge from eyes  Neck: Not rigid. No visible masses. No cervical vertebral tenderness or step-off. Reluctant to move head quickly. Chest wall/Lung: Clear to auscultation bilaterally,  respirations unlabored. No ronchi/wheezing/rales  Heart: RRR, no murmur  Extremities:  No edema  Skin: No rashes. No jaundice  Neuro: Alert and oriented. Horizontal nystagmus with basic eye movement. Eye movements do elicit nausea/dizziness. Psych: Appropriate mood and appropriate affect  Assessment and Plan   Persistent postural-perceptual dizziness  Plan continue wean off of Lexapro. Increase Wellbutrin as an alternative. Finish vestibular therapy. Refer for tilt table testing and consideration of other options through cardio given how long this has persisted. Will treat migraine if these other options are not effective. Would like patient to follow-up with neuro at [facility] but has some insurance limitations at this time. Idiopathic hypotension   This is a longstanding concern even prior to onset of the symptoms. Consideration of tilt table testing. Consideration of midodrine or increased salt intake for trial to see if this relieves symptoms in the future if other evaluation and treatment does not result in improvement. Return in about 4 weeks (around 8/4/2022). Joselito Bell, DO     This document may have been prepared at least partially through the use of voice recognition software.  Although effort is taken to assure the accuracy of this document, it is possible that grammatical, syntax,  or spelling errors may occur.

## 2022-07-13 ENCOUNTER — TELEPHONE (OUTPATIENT)
Dept: CARDIOLOGY | Age: 47
End: 2022-07-13

## 2022-07-13 NOTE — TELEPHONE ENCOUNTER
CALLED PATIENT AND LEFT MESSAGE TO SCHEDULE ECHO    Electronically signed by Gin Cason on 7/13/2022 at 10:30 AM

## 2022-07-19 ENCOUNTER — OFFICE VISIT (OUTPATIENT)
Dept: FAMILY MEDICINE CLINIC | Age: 47
End: 2022-07-19
Payer: COMMERCIAL

## 2022-07-19 ENCOUNTER — TELEPHONE (OUTPATIENT)
Dept: CARDIOLOGY | Age: 47
End: 2022-07-19

## 2022-07-19 VITALS
DIASTOLIC BLOOD PRESSURE: 69 MMHG | BODY MASS INDEX: 25.83 KG/M2 | SYSTOLIC BLOOD PRESSURE: 105 MMHG | HEART RATE: 78 BPM | OXYGEN SATURATION: 98 % | HEIGHT: 65 IN | TEMPERATURE: 97.9 F | WEIGHT: 155 LBS | RESPIRATION RATE: 16 BRPM

## 2022-07-19 DIAGNOSIS — R42 DIZZINESS: Primary | ICD-10-CM

## 2022-07-19 DIAGNOSIS — H93.A9 VASCULAR ORIGIN TINNITUS: ICD-10-CM

## 2022-07-19 DIAGNOSIS — F33.1 MODERATE EPISODE OF RECURRENT MAJOR DEPRESSIVE DISORDER (HCC): Chronic | ICD-10-CM

## 2022-07-19 DIAGNOSIS — R42 PERSISTENT POSTURAL-PERCEPTUAL DIZZINESS: ICD-10-CM

## 2022-07-19 DIAGNOSIS — Z82.0 FAMILY HISTORY OF PARKINSON DISEASE: ICD-10-CM

## 2022-07-19 DIAGNOSIS — R29.3 POSTURAL INSTABILITY: ICD-10-CM

## 2022-07-19 DIAGNOSIS — R11.0 NAUSEA: ICD-10-CM

## 2022-07-19 DIAGNOSIS — I95.9 HYPOTENSION, UNSPECIFIED HYPOTENSION TYPE: ICD-10-CM

## 2022-07-19 DIAGNOSIS — R26.2 DIFFICULTY WALKING: ICD-10-CM

## 2022-07-19 DIAGNOSIS — R94.31 SHORTENED PR INTERVAL: ICD-10-CM

## 2022-07-19 PROCEDURE — 99214 OFFICE O/P EST MOD 30 MIN: CPT | Performed by: STUDENT IN AN ORGANIZED HEALTH CARE EDUCATION/TRAINING PROGRAM

## 2022-07-19 PROCEDURE — 93000 ELECTROCARDIOGRAM COMPLETE: CPT | Performed by: STUDENT IN AN ORGANIZED HEALTH CARE EDUCATION/TRAINING PROGRAM

## 2022-07-19 RX ORDER — ONDANSETRON 4 MG/1
4 TABLET, FILM COATED ORAL 3 TIMES DAILY PRN
Qty: 15 TABLET | Refills: 0 | Status: SHIPPED
Start: 2022-07-19 | End: 2022-08-26

## 2022-07-19 RX ORDER — ESCITALOPRAM OXALATE 5 MG/1
5 TABLET ORAL DAILY
Qty: 60 TABLET | Refills: 0 | Status: SHIPPED
Start: 2022-07-19 | End: 2022-09-08

## 2022-07-19 RX ORDER — DIAZEPAM 2 MG/1
2 TABLET ORAL EVERY 12 HOURS PRN
Qty: 5 TABLET | Refills: 0 | Status: SHIPPED | OUTPATIENT
Start: 2022-07-19 | End: 2022-07-24

## 2022-07-19 NOTE — PROGRESS NOTES
Patient:  Sariah Alonzo 42 y.o. female     07/19/22      Chiefcomplaint:   Chief Complaint   Patient presents with    Depression    Nausea     Problems and Overview   Dizziness with postural instability and difficulty walking  CT scan and MRI done without definite evidence of central cause  Neuro:felt juli hallpike positive on left. Since then appears neg. ENT - vng neg  Neuro outpatient: migraine possible +/- bppv. MRI findings migraine vs microvascular ischemia vs idiopathic  Swooshing in ears b/l  Vestibular rehab not helpful    Echo tomorrow  Referred to EP 8/31  Maybe POTS - tachy with almost 40bpm increase on standing    HA daily    Weaning off of lexapro did not impact symptoms. Continuing rehab - not sure if helping. Wondering about letter to get out of cruise    Not eating bc of nausea    Patient Active Problem List    Diagnosis Date Noted    Difficulty walking 07/19/2022     Priority: Medium    Postural instability 07/19/2022     Priority: Medium    Family history of Parkinson disease 07/19/2022     Priority: Medium    Persistent postural-perceptual dizziness 05/18/2022     Priority: Medium     Evaluated by ENT, Audiology with VNG (no abnormality noted), neurology ( migraine variant vs bppv), MRI CTA as noted below. CTA  Impression   1. No acute intracranial abnormality. 2. Unremarkable CTA of the head. MRI  Multifocal punctate foci of subtle increased T2/FLAIR signal within the   bifrontal cerebral white matter. Differential diagnostic considerations   include association with minimal microvascular ischemic disease, migraine   headaches, posttraumatic sequela, vasculitis/inflammatory disease.            Migraine variant with headache 05/18/2022     Childhood onset      Low BP 03/24/2022     Persistent low normal bp      Vitamin D deficiency 09/02/2021    Chronic bilateral low back pain without sciatica 10/21/2011    Endometriosis 05/03/2010    Depression 09/01/1999 Prevention:  Health Maintenance Due   Topic Date Due    HIV screen  Never done    Hepatitis C screen  Never done    COVID-19 Vaccine (3 - Booster for Pfizer series) 10/06/2021      Meds prior:  Current Outpatient Medications   Medication Instructions    buPROPion (WELLBUTRIN XL) 150 mg, Oral, EVERY MORNING    diazePAM (VALIUM) 2 mg, Oral, EVERY 12 HOURS PRN    escitalopram (LEXAPRO) 5 mg, Oral, DAILY    Multiple Vitamins-Minerals (THERAPEUTIC MULTIVITAMIN-MINERALS) tablet 1 tablet, Oral, DAILY    ondansetron (ZOFRAN) 4 mg, Oral, 3 TIMES DAILY PRN      Physical Exam   Vitals: /69 (Site: Right Upper Arm, Position: Supine, Cuff Size: Medium Adult)   Pulse 78   Temp 97.9 °F (36.6 °C) (Temporal)   Resp 16   Ht 5' 5\" (1.651 m)   Wt 155 lb (70.3 kg)   SpO2 98%   BMI 25.79 kg/m²   General Appearance: Alert, oriented, no acute distress  HEENT: No scleral icterus. No visible discharge from eyes. Neck: Not rigid. No visible masses. Chest wall/Lung: Clear to auscultation bilaterally,  respirations unlabored. No ronchi/wheezing/rales  Heart: RRR, no murmur  Extremities:  No edema  Skin: No rashes. No jaundice  Neuro: Alert and oriented. No rigidity or cogwheeling in upper extremities. No tremor. Strength UE intact. Gross sensory intact. Walks slowly but without shuffling, holding onto wall for stability. Psych: Appropriate mood and appropriate affect  Assessment and Plan   Dizziness  Ongoing evaluation without clear etiology. Recommend MRA head and neck for further consideration of central/cerebellar cause given persistent and progressive symptoms without clear peripheral etiology and sounds of whooshing possibly consistent with vascular abnormality. Ekg unchanged with mild reduction in RI. Echo tomorrow for sx + fam hx HOCM. Orthostatics today with increase in pulse of almost 40 bpm upon standing. Consideration of POTS as additional issue but symptoms not entirely positional. Will see EP as well. Maintain good hydration in meantime. Unfortunately weaning off lexapro was not helpful. Will start back up at low dose due to worsening of depression symptoms. Vestibular and cervical rehab so far not helpful. Vng neg. No sign parkinson on exam. Does have fam hx  -     EKG 12 Lead  -     diazePAM (VALIUM) 2 MG tablet; Take 1 tablet by mouth every 12 hours as needed (vertigo) for up to 5 days. , Disp-5 tablet, R-0Normal  Hypotension, unspecified hypotension type  Difficulty walking  Postural instability  Persistent postural-perceptual dizziness  Moderate episode of recurrent major depressive disorder (HCC)  Nausea  -     ondansetron (ZOFRAN) 4 MG tablet; Take 1 tablet by mouth 3 times daily as needed for Nausea or Vomiting, Disp-15 tablet, R-0Normal      No follow-ups on file. Donnell Garces DO     This document may have been prepared at least partially through the use of voice recognition software. Although effort is taken to assure the accuracy of this document, it is possible that grammatical, syntax,  or spelling errors may occur.

## 2022-07-20 ENCOUNTER — HOSPITAL ENCOUNTER (OUTPATIENT)
Dept: CARDIOLOGY | Age: 47
Discharge: HOME | End: 2022-07-20
Payer: COMMERCIAL

## 2022-07-20 DIAGNOSIS — G45.9 TIA (TRANSIENT ISCHEMIC ATTACK): ICD-10-CM

## 2022-07-20 LAB
LV EF: 63 %
LVEF MODALITY: NORMAL

## 2022-07-20 PROCEDURE — 2580000003 HC RX 258: Performed by: STUDENT IN AN ORGANIZED HEALTH CARE EDUCATION/TRAINING PROGRAM

## 2022-07-20 PROCEDURE — 93306 TTE W/DOPPLER COMPLETE: CPT

## 2022-07-20 RX ORDER — SODIUM CHLORIDE 0.9 % (FLUSH) 0.9 %
5-40 SYRINGE (ML) INJECTION PRN
Status: DISCONTINUED | OUTPATIENT
Start: 2022-07-20 | End: 2022-07-21 | Stop reason: HOSPADM

## 2022-07-20 RX ADMIN — SODIUM CHLORIDE, PRESERVATIVE FREE 10 ML: 5 INJECTION INTRAVENOUS at 13:39

## 2022-07-20 RX ADMIN — SODIUM CHLORIDE, PRESERVATIVE FREE 10 ML: 5 INJECTION INTRAVENOUS at 13:38

## 2022-08-11 PROBLEM — R29.3 POSTURAL INSTABILITY: Status: RESOLVED | Noted: 2022-07-19 | Resolved: 2022-08-11

## 2022-08-12 ENCOUNTER — TELEPHONE (OUTPATIENT)
Dept: FAMILY MEDICINE CLINIC | Age: 47
End: 2022-08-12

## 2022-08-12 DIAGNOSIS — R29.3 POSTURAL INSTABILITY: ICD-10-CM

## 2022-08-12 DIAGNOSIS — R42 DIZZINESS: Primary | ICD-10-CM

## 2022-08-12 DIAGNOSIS — R42 PERSISTENT POSTURAL-PERCEPTUAL DIZZINESS: ICD-10-CM

## 2022-08-12 NOTE — TELEPHONE ENCOUNTER
[facility] phoned they do not do W and WO contrast on MRA of Head please change the order too WO contrast please, pt is scheduled tomorrow thanks

## 2022-08-13 ENCOUNTER — HOSPITAL ENCOUNTER (OUTPATIENT)
Dept: MRI IMAGING | Age: 47
Discharge: HOME | End: 2022-08-15
Payer: COMMERCIAL

## 2022-08-13 DIAGNOSIS — R29.3 POSTURAL INSTABILITY: ICD-10-CM

## 2022-08-13 DIAGNOSIS — R42 PERSISTENT POSTURAL-PERCEPTUAL DIZZINESS: ICD-10-CM

## 2022-08-13 DIAGNOSIS — H93.A9 VASCULAR ORIGIN TINNITUS: ICD-10-CM

## 2022-08-13 DIAGNOSIS — R42 DIZZINESS: ICD-10-CM

## 2022-08-13 DIAGNOSIS — R26.2 DIFFICULTY WALKING: ICD-10-CM

## 2022-08-13 PROCEDURE — A9579 GAD-BASE MR CONTRAST NOS,1ML: HCPCS | Performed by: RADIOLOGY

## 2022-08-13 PROCEDURE — 70544 MR ANGIOGRAPHY HEAD W/O DYE: CPT

## 2022-08-13 PROCEDURE — 70549 MR ANGIOGRAPH NECK W/O&W/DYE: CPT

## 2022-08-13 PROCEDURE — 6360000004 HC RX CONTRAST MEDICATION: Performed by: RADIOLOGY

## 2022-08-13 RX ADMIN — GADOTERIDOL 20 ML: 279.3 INJECTION, SOLUTION INTRAVENOUS at 08:33

## 2022-08-16 ENCOUNTER — TELEPHONE (OUTPATIENT)
Dept: FAMILY MEDICINE CLINIC | Age: 47
End: 2022-08-16

## 2022-08-26 ENCOUNTER — OFFICE VISIT (OUTPATIENT)
Dept: NON INVASIVE DIAGNOSTICS | Age: 47
End: 2022-08-26
Payer: COMMERCIAL

## 2022-08-26 VITALS
BODY MASS INDEX: 25.59 KG/M2 | HEART RATE: 60 BPM | WEIGHT: 153.6 LBS | SYSTOLIC BLOOD PRESSURE: 98 MMHG | OXYGEN SATURATION: 99 % | DIASTOLIC BLOOD PRESSURE: 66 MMHG | RESPIRATION RATE: 18 BRPM | HEIGHT: 65 IN

## 2022-08-26 DIAGNOSIS — R42 DIZZINESS: Primary | ICD-10-CM

## 2022-08-26 PROCEDURE — 99204 OFFICE O/P NEW MOD 45 MIN: CPT | Performed by: INTERNAL MEDICINE

## 2022-08-26 PROCEDURE — 93000 ELECTROCARDIOGRAM COMPLETE: CPT | Performed by: INTERNAL MEDICINE

## 2022-08-26 RX ORDER — ESCITALOPRAM OXALATE 10 MG/1
10 TABLET ORAL DAILY
COMMUNITY
End: 2022-09-08 | Stop reason: SDUPTHER

## 2022-08-26 NOTE — PROGRESS NOTES
[facility] and Vascular [facility] Electrophysiology  Consultation Report  PATIENT: Roya Butcher  MEDICAL RECORD NUMBER: [id number]  DATE OF SERVICE:  8/26/2022  ATTENDING ELECTROPHYSIOLOGIST: Lorrayne Dakins, MD  REFERRING PHYSICIAN: Valentina Mccormick DO and Ashley Cho DO  CHIEF COMPLAINT:   Persistent Dizziness        HPI: This is a 42 y.o. female with a history of dizziness since Jan 23rd. The patient remembers the exact day when her symptoms started. Her symptoms occur throughout the day, in any position and can be aggravated by movement. The patient notices that her symptoms worsen when she tries to move what if she sees constant motion from a another object or person. She is a  by [employer] and has to get to work early in the morning. She tries to eat 3 meals a day but is unable to do so frequently. She does indulge in moderate to excessive amounts of caffeine in the form of coffee and soda daily. She has been informed about salt and fluid intake and attempts to do so regularly. She has also been on Lexapro since 2005 for major depression. In addition the patient is also on multiple over-the-counter supplements. Her  who is with her also mentions a prolonged illness that he went through last year which placed her under considerable stress. She has been to several physicians with her symptoms including neurology and ENT and has had no specific diagnosis or treatments assigned.   The patient was therefore referred to electrophysiology now for possibly a tilt test.  No episodes of syncope  No other cardiac symptoms, chest pain or shortness of breath  No symptoms of paroxysmal nocturnal dyspnea, orthopnea or leg edema      Patient Active Problem List    Diagnosis Date Noted    Difficulty walking 07/19/2022     Priority: Medium    Family history of Parkinson disease 07/19/2022     Priority: Medium    Persistent postural-perceptual dizziness 05/18/2022 Priority: Medium     Overview Note:     Evaluated by ENT, Audiology with VNG (no abnormality noted), neurology ( migraine variant vs bppv), MRI CTA as noted below. CTA  Impression   1. No acute intracranial abnormality. 2. Unremarkable CTA of the head. MRI  Multifocal punctate foci of subtle increased T2/FLAIR signal within the   bifrontal cerebral white matter. Differential diagnostic considerations   include association with minimal microvascular ischemic disease, migraine   headaches, posttraumatic sequela, vasculitis/inflammatory disease. Migraine variant with headache 05/18/2022     Overview Note:     Childhood onset      Low BP 03/24/2022     Overview Note:     Persistent low normal bp      Vitamin D deficiency 09/02/2021    Chronic bilateral low back pain without sciatica 10/21/2011    Endometriosis 05/03/2010    Depression 09/01/1999       Past Medical History:   Diagnosis Date    Cephalgia 5/18/2022    Depression     Endometriosis     Hyperacusis of left ear 5/18/2022    Reported by pt.     Low blood pressure 5/18/2022    History of low BP    Migraine variant with headache 5/18/2022    Childhood onset    Peripheral vestibulopathy 5/18/2022    Persistent postural-perceptual dizziness 5/18/2022    PONV (postoperative nausea and vomiting)     Vertigo        Family History   Problem Relation Age of Onset    Other Mother         hypertrophic cardiomyopathy, fibromyalgia, sarcoid    Heart Disease Father     Leukemia Maternal Grandmother        Social History     Tobacco Use    Smoking status: Never    Smokeless tobacco: Never   Substance Use Topics    Alcohol use: No       Current Outpatient Medications   Medication Sig Dispense Refill    escitalopram (LEXAPRO) 10 MG tablet Take 10 mg by mouth daily      NONFORMULARY Take 1 tablet by mouth in the morning, at noon, and at bedtime A-F Betafood      Cyanocobalamin (B-12 PO) Take 1 tablet by mouth daily Cataplex B12      NONFORMULARY Take 2 tablets by mouth daily Adrenal Desiccated      VITAMIN D PO Take 2 tablets by mouth daily Cataplex D      NONFORMULARY Take 2 tablets by mouth in the morning, at noon, and at bedtime Zypan      NONFORMULARY Take 3 tablets by mouth daily Catalyn      Multiple Vitamins-Minerals (THERAPEUTIC MULTIVITAMIN-MINERALS) tablet Take 1 tablet by mouth daily      escitalopram (LEXAPRO) 5 MG tablet Take 1 tablet by mouth in the morning. 60 tablet 0     No current facility-administered medications for this visit. Allergies   Allergen Reactions    Compazine [Prochlorperazine Maleate] Other (See Comments)     Patient states \"I go catatonic\"    Codeine Nausea And Vomiting       ROS:   Constitutional: Negative for fever, activity change and appetite change. HENT: Negative for epistaxis. Eyes: Negative for diploplia, blurred vision. Respiratory: Negative for cough, chest tightness, shortness of breath and wheezing. Cardiovascular: pertinent positives in HPI  Gastrointestinal: Negative for abdominal pain and blood in stool. All other review of systems are negative     PHYSICAL EXAM:   Vitals:    08/26/22 0945   BP: 98/66   Pulse: 60   Resp: 18   SpO2: 99%   Weight: 153 lb 9.6 oz (69.7 kg)   Height: 5' 5\" (1.651 m)      Constitutional: Well-developed, no acute distress  Eyes: conjunctivae normal, no xanthelasma   Ears, Nose, Throat: oral mucosa moist, no cyanosis   CV: PMI is normally placed, regular rate and rhythm. Normal S1S2 and no S3. No murmurs, rubs, or gallops. Lungs: clear to auscultation bilaterally, normal respiratory effort without used of accessory muscles  Abdomen: soft, non-tender, bowel sounds present, no masses or hepatomegaly   Musculoskeletal: no digital clubbing, no edema   Skin: warm, no rashes     I have personally reviewed the laboratory, cardiac diagnostic and radiographic testing as outlined below:    Data:    No results for input(s): WBC, HGB, HCT, PLT in the last 72 hours.   No results for input(s): NA, K, CL, CO2, BUN, CREATININE, GLU, CALCIUM in the last 72 hours. Invalid input(s): MAGNESIUM   Lab Results   Component Value Date/Time    MG 2.0 2018 05:13 PM     No results for input(s): TSH in the last 72 hours. No results for input(s): INR in the last 72 hours. EK22 Sinus rhythm    Echocardiogram: 22  Conclusions      Summary   Normal left ventricular size and systolic function. Ejection fraction is visually estimated at 60-65%. Normal diastolic function. No regional wall motion abnormalities seen. Normal left ventricular wall thickness. Normal right ventricular size and function. Agitated saline injected for shunt evaluation. No evidence of atrial level shunt. No significant valvular abnormalities. No intracardiac mass or thrombus. Signature      ----------------------------------------------------------------   Electronically signed by Charlene Gomez MD(Interpreting   physician) on 2022 07:13 PM   ----------------------------------------------------------------    MRA of head  Impression   1. 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 2. Otherwise, unremarkable MRA of the brain and neck. RECOMMENDATIONS:     MRI brain without contrast    Impression   No evidence of acute ischemia. Multifocal punctate foci of subtle increased T2/FLAIR signal within the   bifrontal cerebral white matter. Differential diagnostic considerations   include association with minimal microvascular ischemic disease, migraine   headaches, posttraumatic sequela, vasculitis/inflammatory disease. I have independently reviewed all of the ECGs and rhythm strips per above     Assessment/Plan:    Persistent dizziness.   Symptoms are not suggestive of neurocardiogenic syncope  Possible excessive caffeine use  Over-the-counter supplements with ingredients that might be the culprit for her symptoms    Nondiagnostic ENT and neurological work-up    Cardiac work-up including EKG and echo were unremarkable    Major depression by history. Patient is on Lexapro         Recommendations:    Tilt test to rule out neurocardiogenic syncope and guide therapy if necessary  The patient was asked to stop all her over-the-counter, supplements for at least 4 weeks     Advised life style modifications as below     - Make all postural changes from lying to sitting or sitting to standing slowly. - Drink to 2.0 -2.5 L of fluids per day. With bad symptoms, drink 500 cc of water quickly. This will result in an increased blood pressure within 5 minutes of drinking the water. The effect will last up to one hour and may improve orthostatic intolerance. - Increase sodium in the diet to 3 - 5 g per day. If not helpful and BP is stable, may try 5-7 g per day. - Avoid large meals which can cause low blood pressure during digestion. It is better to eat smaller meals more often than three large meals. - Avoid alcohol. Alcohol and cause blood to pool in the legs which may worsen low blood pressure reactions when standing. Avoid excessive caffeine intake as it may increase urine production and reduce blood volume. - Perform lower extremity exercises to improve strength of the leg muscles. This will help prevent blood from a pooling in the legs when standing and walking. Preferred exercises are walking, squatting or stationery bicycling.      -Use physical counter maneuvers such as leg crossing, or leg raising and resting the leg on a chair. These maneuvers increase blood pressure and can improve orthostatic intolerance quickly and transiently. - Raise the head of the bed by 6 to 10 inches. The entire bed must be at an angle. Raising only the head portion of the bed at waist level or using pillows will not be effective. Raising the head of the bed will reduce urine formation overnight and there will be more volume in the circulation in the morning. - Use custom fitted elastic support stockings. These will reduce a tendency for blood to pool in the legs when standing and may improve orthostatic intolerance     I have spent a total of 50-60 minutes with the patient and the family reviewing the above stated recommendations. And a total of >50% of that time involved face-to-face time providing counseling and or coordination of care with the other providers, preparation for the clinic visit, reviewing records/tests, counseling/education of the patient, ordering medications/tests/procedures, coordinating care, and documenting clinical information in the EHR. Thank you for allowing me to participate in your patient's care. Please call me if there are any questions or concerns.       Hima Roche MD, [facility]  Cardiac Electrophysiology  [facility]) Physicians  [facility] [place]: [facility]

## 2022-09-08 ENCOUNTER — TELEMEDICINE (OUTPATIENT)
Dept: FAMILY MEDICINE CLINIC | Age: 47
End: 2022-09-08
Payer: COMMERCIAL

## 2022-09-08 DIAGNOSIS — I95.9 HYPOTENSION, UNSPECIFIED HYPOTENSION TYPE: ICD-10-CM

## 2022-09-08 DIAGNOSIS — R42 PERSISTENT POSTURAL-PERCEPTUAL DIZZINESS: ICD-10-CM

## 2022-09-08 DIAGNOSIS — G43.809 MIGRAINE VARIANT WITH HEADACHE: ICD-10-CM

## 2022-09-08 DIAGNOSIS — F33.1 MODERATE EPISODE OF RECURRENT MAJOR DEPRESSIVE DISORDER (HCC): Primary | ICD-10-CM

## 2022-09-08 DIAGNOSIS — M13.0 POLYARTICULAR ARTHRITIS: ICD-10-CM

## 2022-09-08 PROCEDURE — 99214 OFFICE O/P EST MOD 30 MIN: CPT | Performed by: STUDENT IN AN ORGANIZED HEALTH CARE EDUCATION/TRAINING PROGRAM

## 2022-09-08 RX ORDER — ESCITALOPRAM OXALATE 10 MG/1
10 TABLET ORAL DAILY
Qty: 90 TABLET | Refills: 1 | Status: SHIPPED | OUTPATIENT
Start: 2022-09-08 | End: 2023-03-07

## 2022-09-08 SDOH — ECONOMIC STABILITY: FOOD INSECURITY: WITHIN THE PAST 12 MONTHS, THE FOOD YOU BOUGHT JUST DIDN'T LAST AND YOU DIDN'T HAVE MONEY TO GET MORE.: NEVER TRUE

## 2022-09-08 SDOH — ECONOMIC STABILITY: FOOD INSECURITY: WITHIN THE PAST 12 MONTHS, YOU WORRIED THAT YOUR FOOD WOULD RUN OUT BEFORE YOU GOT MONEY TO BUY MORE.: NEVER TRUE

## 2022-09-08 ASSESSMENT — SOCIAL DETERMINANTS OF HEALTH (SDOH): HOW HARD IS IT FOR YOU TO PAY FOR THE VERY BASICS LIKE FOOD, HOUSING, MEDICAL CARE, AND HEATING?: NOT HARD AT ALL

## 2022-09-08 NOTE — PROGRESS NOTES
Patient:  Andreea Duff 42 y.o. female     09/08/22      Chiefcomplaint:   Chief Complaint   Patient presents with    Dizziness     Follow up      Congestion     Home covid test negative       History of Present Illness   Congestion, sore throat started yesterday. Has had recurrent sinus infections in the past.   Claritin d is helping. Initial covid test at home was neg    Last saw cardio ep - says they were supposed to call for tilt table but not done yet. Recommended to limit caffeine and stop supplements and she has done this. Losing weight due to not eating as much. Trying to exercise more, using stationary bike. Stopped PT at this point. Saw doctor at [facility] spine - per physical therapist request. Not felt neuro. Noted no weakness. Lexapro 10mg - on this for 3 weeks    Health Maintenance Due   Topic Date Due    HIV screen  Never done    Hepatitis C screen  Never done    COVID-19 Vaccine (3 - Booster for Pfizer series) 10/06/2021    Flu vaccine (1) Never done      History:  Prior to Visit Medications    Medication Sig Taking?  Authorizing Provider   escitalopram (LEXAPRO) 10 MG tablet Take 1 tablet by mouth daily Yes Bunny Dung, DO   NONFORMULARY Take 1 tablet by mouth in the morning, at noon, and at bedtime A-F Betafood  Historical Provider, MD   Cyanocobalamin (B-12 PO) Take 1 tablet by mouth daily Cataplex B12  Historical Provider, MD   NONFORMULARY Take 2 tablets by mouth daily Adrenal Desiccated  Historical Provider, MD   VITAMIN D PO Take 2 tablets by mouth daily Cataplex D  Historical Provider, MD   NONFORMULARY Take 2 tablets by mouth in the morning, at noon, and at bedtime Zypan  Historical Provider, MD   NONFORMULARY Take 3 tablets by mouth daily Catalyn  Historical Provider, MD   Multiple Vitamins-Minerals (THERAPEUTIC MULTIVITAMIN-MINERALS) tablet Take 1 tablet by mouth daily  Historical Provider, MD      Past Medical History:   Diagnosis Date    Cephalgia 5/18/2022    Depression     Endometriosis     Hyperacusis of left ear 5/18/2022    Reported by pt. Low blood pressure 5/18/2022    History of low BP    Migraine variant with headache 5/18/2022    Childhood onset    Peripheral vestibulopathy 5/18/2022    Persistent postural-perceptual dizziness 5/18/2022    PONV (postoperative nausea and vomiting)     Vertigo      Physical Exam   Vitals: There were no vitals taken for this visit. General Appearance: Alert, oriented, no acute distress  HEENT: No scleral icterus. No visible discharge from eyes  Skin: No rashes. No jaundice  Neuro: Alert and oriented        Psych: Appropriate mood and appropriate affect    Assessment and Plan   No significant change in uncontrolled dizziness. Plan to refer to balance disorder/vestibular disorder clinic at [address] [place] [address]. Recommend tilt table, pt will call to scheduled. Will monitor sinus concerns. Recheck labs to further assess her persistent dizziness including inflammatory disorders, vitamin deficiency, chronic infection including lyme. Moderate episode of recurrent major depressive disorder (HCC)  -     escitalopram (LEXAPRO) 10 MG tablet; Take 1 tablet by mouth daily, Disp-90 tablet, R-1Normal  Migraine variant with headache  -     CBC with Auto Differential; Future  -     Lipid Panel; Future  -     Comprehensive Metabolic Panel; Future  -     Ferritin; Future  Persistent postural-perceptual dizziness  -     CBC with Auto Differential; Future  -     Lipid Panel; Future  -     Comprehensive Metabolic Panel; Future  -     Vitamin B12 & Folate; Future  -     TSH; Future  -     Lyme Disease Acute Reflexive Panel; Future  -     Sedimentation Rate; Future  -     C-Reactive Protein; Future  -     VITAMIN B6; Future  Hypotension, unspecified hypotension type  -     CBC with Auto Differential; Future  -     Lipid Panel; Future  -     Comprehensive Metabolic Panel; Future  Polyarticular arthritis  -     CAITLIN;  Future  No follow-ups on file. Ira Butterfield DO     Patient is being evaluated by a Virtual Visit (video visit) encounter to address concerns as mentioned above. A caregiver was present when appropriate. Due to this being a TeleHealth encounter (During PGNCS-32 public health emergency), evaluation of the following organ systems was limited: Vitals/Constitutional/EENT/Resp/CV/GI//MS/Neuro/Skin/Heme-Lymph-Imm. Pursuant to the emergency declaration under the [address] [place] [address] and the [employer] Act, this Virtual Visit was conducted with patient's (and/or legal guardian's) consent, to reduce the patient's risk of exposure to COVID-19 and provide necessary medical care. The patient (and/or legal guardian) has also been advised to contact this office for worsening conditions or problems, and seek emergency medical treatment and/or call 911 if deemed necessary. Patient identification was verified at the start of the visit    Services were provided through a video synchronous discussion virtually to substitute for in-person clinic visit. Patient and provider were located at their individual homes. This document may have been prepared at least partially through the use of voice recognition software. Although effort is taken to assure the accuracy of this document, it is possible that grammatical, syntax,  or spelling errors may occur.

## 2022-09-20 ENCOUNTER — TELEPHONE (OUTPATIENT)
Dept: FAMILY MEDICINE CLINIC | Age: 47
End: 2022-09-20

## 2022-09-22 NOTE — TELEPHONE ENCOUNTER
Pt called and states she spoke with Ambika at [facility] and she had told her nothing was received.  Nereyda Sees will be calling the office to speak with someone so that information can go to correct person to have this authorized

## 2022-09-24 ENCOUNTER — HOSPITAL ENCOUNTER (OUTPATIENT)
Age: 47
Discharge: HOME | End: 2022-09-24
Payer: COMMERCIAL

## 2022-09-24 DIAGNOSIS — I95.9 HYPOTENSION, UNSPECIFIED HYPOTENSION TYPE: ICD-10-CM

## 2022-09-24 DIAGNOSIS — M13.0 POLYARTICULAR ARTHRITIS: ICD-10-CM

## 2022-09-24 DIAGNOSIS — G43.809 MIGRAINE VARIANT WITH HEADACHE: ICD-10-CM

## 2022-09-24 DIAGNOSIS — R42 PERSISTENT POSTURAL-PERCEPTUAL DIZZINESS: ICD-10-CM

## 2022-09-24 LAB
ALBUMIN SERPL-MCNC: 4.1 G/DL (ref 3.5–5.2)
ALP BLD-CCNC: 74 U/L (ref 35–104)
ALT SERPL-CCNC: 9 U/L (ref 0–32)
ANION GAP SERPL CALCULATED.3IONS-SCNC: 9 MMOL/L (ref 7–16)
AST SERPL-CCNC: 16 U/L (ref 0–31)
BASOPHILS ABSOLUTE: 0.04 E9/L (ref 0–0.2)
BASOPHILS RELATIVE PERCENT: 0.7 % (ref 0–2)
BILIRUB SERPL-MCNC: 0.5 MG/DL (ref 0–1.2)
BUN BLDV-MCNC: 6 MG/DL (ref 6–20)
C-REACTIVE PROTEIN: 0.3 MG/DL (ref 0–0.4)
CALCIUM SERPL-MCNC: 9.6 MG/DL (ref 8.6–10.2)
CHLORIDE BLD-SCNC: 105 MMOL/L (ref 98–107)
CHOLESTEROL, TOTAL: 184 MG/DL (ref 0–199)
CO2: 28 MMOL/L (ref 22–29)
CREAT SERPL-MCNC: 1 MG/DL (ref 0.5–1)
EOSINOPHILS ABSOLUTE: 0.15 E9/L (ref 0.05–0.5)
EOSINOPHILS RELATIVE PERCENT: 2.6 % (ref 0–6)
FERRITIN: 197 NG/ML
FOLATE: 14.5 NG/ML (ref 4.8–24.2)
GFR AFRICAN AMERICAN: >60
GFR NON-AFRICAN AMERICAN: 60 ML/MIN/1.73
GLUCOSE BLD-MCNC: 96 MG/DL (ref 74–99)
HCT VFR BLD CALC: 38.9 % (ref 34–48)
HDLC SERPL-MCNC: 62 MG/DL
HEMOGLOBIN: 13.6 G/DL (ref 11.5–15.5)
IMMATURE GRANULOCYTES #: 0.01 E9/L
IMMATURE GRANULOCYTES %: 0.2 % (ref 0–5)
LDL CHOLESTEROL CALCULATED: 107 MG/DL (ref 0–99)
LYMPHOCYTES ABSOLUTE: 2.07 E9/L (ref 1.5–4)
LYMPHOCYTES RELATIVE PERCENT: 35.4 % (ref 20–42)
MCH RBC QN AUTO: 32.7 PG (ref 26–35)
MCHC RBC AUTO-ENTMCNC: 35 % (ref 32–34.5)
MCV RBC AUTO: 93.5 FL (ref 80–99.9)
MONOCYTES ABSOLUTE: 0.5 E9/L (ref 0.1–0.95)
MONOCYTES RELATIVE PERCENT: 8.6 % (ref 2–12)
NEUTROPHILS ABSOLUTE: 3.07 E9/L (ref 1.8–7.3)
NEUTROPHILS RELATIVE PERCENT: 52.5 % (ref 43–80)
PDW BLD-RTO: 11.8 FL (ref 11.5–15)
PLATELET # BLD: 336 E9/L (ref 130–450)
PMV BLD AUTO: 10.2 FL (ref 7–12)
POTASSIUM SERPL-SCNC: 4.9 MMOL/L (ref 3.5–5)
RBC # BLD: 4.16 E12/L (ref 3.5–5.5)
SEDIMENTATION RATE, ERYTHROCYTE: 3 MM/HR (ref 0–20)
SODIUM BLD-SCNC: 142 MMOL/L (ref 132–146)
TOTAL PROTEIN: 6.3 G/DL (ref 6.4–8.3)
TRIGL SERPL-MCNC: 75 MG/DL (ref 0–149)
TSH SERPL DL<=0.05 MIU/L-ACNC: 0.81 UIU/ML (ref 0.27–4.2)
VITAMIN B-12: 427 PG/ML (ref 211–946)
VLDLC SERPL CALC-MCNC: 15 MG/DL
WBC # BLD: 5.8 E9/L (ref 4.5–11.5)

## 2022-09-24 PROCEDURE — 36415 COLL VENOUS BLD VENIPUNCTURE: CPT

## 2022-09-24 PROCEDURE — 86140 C-REACTIVE PROTEIN: CPT

## 2022-09-24 PROCEDURE — 82607 VITAMIN B-12: CPT

## 2022-09-24 PROCEDURE — 80061 LIPID PANEL: CPT

## 2022-09-24 PROCEDURE — 80053 COMPREHEN METABOLIC PANEL: CPT

## 2022-09-24 PROCEDURE — 86618 LYME DISEASE ANTIBODY: CPT

## 2022-09-24 PROCEDURE — 84207 ASSAY OF VITAMIN B-6: CPT

## 2022-09-24 PROCEDURE — 85025 COMPLETE CBC W/AUTO DIFF WBC: CPT

## 2022-09-24 PROCEDURE — 84443 ASSAY THYROID STIM HORMONE: CPT

## 2022-09-24 PROCEDURE — 86038 ANTINUCLEAR ANTIBODIES: CPT

## 2022-09-24 PROCEDURE — 82746 ASSAY OF FOLIC ACID SERUM: CPT

## 2022-09-24 PROCEDURE — 82728 ASSAY OF FERRITIN: CPT

## 2022-09-24 PROCEDURE — 85651 RBC SED RATE NONAUTOMATED: CPT

## 2022-09-26 LAB — ANTI-NUCLEAR ANTIBODY (ANA): NEGATIVE

## 2022-09-27 LAB — LYME, EIA: 0.15 LIV (ref 0–1.2)

## 2022-09-29 ENCOUNTER — HOSPITAL ENCOUNTER (OUTPATIENT)
Dept: CARDIAC CATH/INVASIVE PROCEDURES | Age: 47
Discharge: HOME | End: 2022-09-29
Payer: COMMERCIAL

## 2022-09-29 VITALS
BODY MASS INDEX: 24.96 KG/M2 | WEIGHT: 150 LBS | OXYGEN SATURATION: 100 % | SYSTOLIC BLOOD PRESSURE: 106 MMHG | HEART RATE: 60 BPM | RESPIRATION RATE: 20 BRPM | DIASTOLIC BLOOD PRESSURE: 48 MMHG | TEMPERATURE: 97.4 F

## 2022-09-29 PROCEDURE — 93660 TILT TABLE EVALUATION: CPT

## 2022-09-29 PROCEDURE — 93660 TILT TABLE EVALUATION: CPT | Performed by: INTERNAL MEDICINE

## 2022-09-30 NOTE — PROCEDURES
[address]                  [address] [facility],  [facility]                                 PROCEDURE NOTE    PATIENT NAME: Rad Guevara              :        1975  MED REC NO:   [id number]                            ROOM:  ACCOUNT NO:   [de-identified]                           ADMIT DATE: 2022  PROVIDER:     Omid Campos MD    DATE OF PROCEDURE:  2022    NAME OF THE PROCEDURE:  Tilt table test.    PREPROCEDURE DIAGNOSES:  Recurrent near syncope and lightheadedness. POSTPROCEDURE DIAGNOSES:  Recurrent near syncope and lightheadedness. :  Omid Campos MD    COMPLICATIONS:  None. INDICATIONS:  This 68-year-old patient was referred to me for symptoms  of persistent lightheadedness and tilt table test to rule out  neurocardiogenic syncope. The procedure was discussed with her and she  was brought in for the same. DESCRIPTION OF PROCEDURE:  The patient was brought to the  electrophysiology area in the postabsorptive, nonsedated state. After  the usual noninvasive blood pressure and heart rate monitoring were  instituted, the patient was tilted to the 70-degree position. Blood  pressure and heart rate at baseline were 103/60 and 60 beats per minute. In the 70-degree position, blood pressure and heart rate were 109/67 and  56 beats per minute. The patient was kept in the 70-degree position for  the next 35 minutes without significant changes in blood pressure. Heart rate remained at 60 to 70 beats per minute. The patient was  therefore brought back to supine position and the procedure completed. CONCLUSION:  Negative 70-degree tilt as noted above with no significant  changes in blood pressure or heart rate; however, the patient does  remain somewhat hypotensive throughout the procedure, that is while  sitting, standing, or lying down. RECOMMENDATIONS:  1.   In view of all of the above, I suggested increasing salt intake. 2.  Use of support stockings. If these are helpful, one could consider  use of oral midodrine to increase her ambulatory blood pressure to see  if that improves her clinical symptoms. All of this discussed with the  patient.         Eugene Jensen MD    D: 09/29/2022 12:33:38       T: 09/29/2022 12:35:59     [email or web address]  Job#: [id number]     Doc#: [id number]    CC:

## 2022-10-01 LAB — VITAMIN B6: 20.2 NMOL/L (ref 20–125)

## 2022-10-20 DIAGNOSIS — R10.2 PELVIC PAIN: ICD-10-CM

## 2022-10-20 DIAGNOSIS — N94.6 DYSMENORRHEA: ICD-10-CM

## 2022-10-20 LAB
ESTRADIOL LEVEL: 41.6 PG/ML
FOLLICLE STIMULATING HORMONE: 7 MIU/ML
GONADOTROPIN, CHORIONIC (HCG) QUANT: <0.1 MIU/ML
HCT VFR BLD CALC: 39.8 % (ref 34–48)
HEMOGLOBIN: 13.3 G/DL (ref 11.5–15.5)
LUTEINIZING HORMONE: 6.6 MIU/ML
MCH RBC QN AUTO: 32.8 PG (ref 26–35)
MCHC RBC AUTO-ENTMCNC: 33.4 % (ref 32–34.5)
MCV RBC AUTO: 98 FL (ref 80–99.9)
PDW BLD-RTO: 11.9 FL (ref 11.5–15)
PLATELET # BLD: 263 E9/L (ref 130–450)
PMV BLD AUTO: 10.9 FL (ref 7–12)
PROLACTIN: 8.33 NG/ML
RBC # BLD: 4.06 E12/L (ref 3.5–5.5)
TSH SERPL DL<=0.05 MIU/L-ACNC: 1 UIU/ML (ref 0.27–4.2)
WBC # BLD: 7.5 E9/L (ref 4.5–11.5)

## 2022-10-22 LAB — PROGESTERONE LEVEL: 0.94 NG/ML

## 2022-10-27 LAB — DHEA: 2.82 NG/ML (ref 0.63–4.7)

## 2022-11-08 ENCOUNTER — TELEPHONE (OUTPATIENT)
Dept: FAMILY MEDICINE CLINIC | Age: 47
End: 2022-11-08

## 2022-11-08 DIAGNOSIS — R42 DIZZINESS: Primary | ICD-10-CM

## 2022-11-08 NOTE — TELEPHONE ENCOUNTER
Pt called she would like doctor to prescribe additional labs, electrolytes and A1C.   She also has an appt with [facility] on 12/2/2022

## 2022-12-08 ENCOUNTER — HOSPITAL ENCOUNTER (OUTPATIENT)
Age: 47
Discharge: HOME | End: 2022-12-08
Payer: COMMERCIAL

## 2022-12-08 ENCOUNTER — OFFICE VISIT (OUTPATIENT)
Dept: FAMILY MEDICINE CLINIC | Age: 47
End: 2022-12-08
Payer: COMMERCIAL

## 2022-12-08 VITALS
SYSTOLIC BLOOD PRESSURE: 106 MMHG | TEMPERATURE: 98.2 F | DIASTOLIC BLOOD PRESSURE: 69 MMHG | HEIGHT: 65 IN | RESPIRATION RATE: 20 BRPM | WEIGHT: 157.6 LBS | HEART RATE: 65 BPM | BODY MASS INDEX: 26.26 KG/M2

## 2022-12-08 DIAGNOSIS — R42 DIZZINESS: ICD-10-CM

## 2022-12-08 DIAGNOSIS — R42 DIZZINESS: Primary | ICD-10-CM

## 2022-12-08 LAB
ALBUMIN SERPL-MCNC: 4.1 G/DL (ref 3.5–5.2)
ALP BLD-CCNC: 69 U/L (ref 35–104)
ALT SERPL-CCNC: 10 U/L (ref 0–32)
ANION GAP SERPL CALCULATED.3IONS-SCNC: 6 MMOL/L (ref 7–16)
AST SERPL-CCNC: 19 U/L (ref 0–31)
BILIRUB SERPL-MCNC: 0.4 MG/DL (ref 0–1.2)
BUN BLDV-MCNC: 13 MG/DL (ref 6–20)
CALCIUM SERPL-MCNC: 9.5 MG/DL (ref 8.6–10.2)
CHLORIDE BLD-SCNC: 106 MMOL/L (ref 98–107)
CO2: 27 MMOL/L (ref 22–29)
CREAT SERPL-MCNC: 1 MG/DL (ref 0.5–1)
GFR SERPL CREATININE-BSD FRML MDRD: >60 ML/MIN/1.73
GLUCOSE BLD-MCNC: 80 MG/DL (ref 74–99)
HBA1C MFR BLD: 4.7 % (ref 4–5.6)
POTASSIUM SERPL-SCNC: 4.6 MMOL/L (ref 3.5–5)
SODIUM BLD-SCNC: 139 MMOL/L (ref 132–146)
TOTAL PROTEIN: 6.4 G/DL (ref 6.4–8.3)

## 2022-12-08 PROCEDURE — 83036 HEMOGLOBIN GLYCOSYLATED A1C: CPT

## 2022-12-08 PROCEDURE — 99213 OFFICE O/P EST LOW 20 MIN: CPT | Performed by: STUDENT IN AN ORGANIZED HEALTH CARE EDUCATION/TRAINING PROGRAM

## 2022-12-08 PROCEDURE — 36415 COLL VENOUS BLD VENIPUNCTURE: CPT

## 2022-12-08 PROCEDURE — 80053 COMPREHEN METABOLIC PANEL: CPT

## 2022-12-08 NOTE — PROGRESS NOTES
Patient:  Heladio Alvarado 41 y.o. female     12/08/22      Chiefcomplaint:   Chief Complaint   Patient presents with    Dizziness     History of Present Illness   ***     Health Maintenance Due   Topic Date Due    HIV screen  Never done    Hepatitis C screen  Never done    COVID-19 Vaccine (3 - Booster for Pfizer series) 07/01/2021    Flu vaccine (1) Never done      History:  Prior to Visit Medications    Medication Sig Taking? Authorizing Provider   Compression Stockings MISC by Does not apply route Yes Hemant Hewitt MD   escitalopram (LEXAPRO) 10 MG tablet Take 1 tablet by mouth daily Yes Stephania Morris DO   NONFORMULARY Take 1 tablet by mouth in the morning, at noon, and at bedtime A-F Betafood Yes Historical Provider, MD   Cyanocobalamin (B-12 PO) Take 1 tablet by mouth daily Cataplex B12 Yes Historical Provider, MD   NONFORMULARY Take 2 tablets by mouth daily Adrenal Desiccated Yes Historical Provider, MD   VITAMIN D PO Take 2 tablets by mouth daily Cataplex D Yes Historical Provider, MD   NONFORMULARY Take 2 tablets by mouth in the morning, at noon, and at bedtime Zypan Yes Historical Provider, MD   NONFORMULARY Take 3 tablets by mouth daily Catalyn Yes Historical Provider, MD   Multiple Vitamins-Minerals (THERAPEUTIC MULTIVITAMIN-MINERALS) tablet Take 1 tablet by mouth daily Yes Historical Provider, MD      Past Medical History:   Diagnosis Date    Cephalgia 05/18/2022    Depression     Endometriosis     Hyperacusis of left ear 05/18/2022    Reported by pt.     Low blood pressure 05/18/2022    History of low BP    Migraine variant with headache 05/18/2022    Childhood onset    Peripheral vestibulopathy 05/18/2022    Persistent postural-perceptual dizziness 05/18/2022    PONV (postoperative nausea and vomiting)     Tilt table evaluation 09/29/2022    Dr. Woodrow Wadsworth    Vertigo      Physical Exam   Vitals: /69   Pulse 65   Temp 98.2 °F (36.8 °C) (Temporal)   Resp 20   Ht 5' 5\" (1.651 m)   Wt 157 lb 9.6 oz (71.5 kg)   LMP 11/22/2022   BMI 26.23 kg/m²   General Appearance: Alert, oriented, no acute distress  HEENT: No scleral icterus. No visible discharge from eyes  Neck: Not rigid. No visible masses  Chest wall/Lung: Clear to auscultation bilaterally,  respirations unlabored. No ronchi/wheezing/rales  Heart: RRR, no murmur  Abdomen: Soft, nontender  Extremities:  No edema  Skin: No rashes. No jaundice  Neuro: Alert and oriented        Psych: Appropriate mood and appropriate affect    Assessment and Plan   {There are no diagnoses linked to this encounter. (Refresh or delete this SmartLink)}  No follow-ups on file. Karen Avendano,      This document may have been prepared at least partially through the use of voice recognition software. Although effort is taken to assure the accuracy of this document, it is possible that grammatical, syntax,  or spelling errors may occur.

## 2022-12-08 NOTE — PROGRESS NOTES
Patient:  Martin Lee 41 y.o. female     12/08/22      Chiefcomplaint:   Chief Complaint   Patient presents with    Dizziness     Problems and Overview   Dizziness with postural instability and difficulty walking  CT scan and MRI done without definite evidence of central cause  Neuro:felt juli hallpike positive on left. Since then appears neg. ENT - vng neg  Neuro outpatient: migraine possible +/- bppv. MRI findings migraine vs microvascular ischemia vs idiopathic  Swooshing in ears b/l - MRA neg  Echo unremarkable  EP evaluated - recommend compression - she is trying this  Completed vesitbular rehab - not helpful  Vestibular eval done at [facility] - no peripheral involvement - recommended neuro fu  Would like additional blood work for electrolytes    Patient Active Problem List    Diagnosis Date Noted    Difficulty walking 07/19/2022     Priority: Medium    Family history of Parkinson disease 07/19/2022     Priority: Medium    Dizziness and giddiness 05/18/2022     Priority: Medium     Evaluated by ENT, Audiology with VNG (no abnormality noted), neurology ( migraine variant vs bppv), MRI CTA as noted below. CTA  Impression   1. No acute intracranial abnormality. 2. Unremarkable CTA of the head. MRI  Multifocal punctate foci of subtle increased T2/FLAIR signal within the   bifrontal cerebral white matter. Differential diagnostic considerations   include association with minimal microvascular ischemic disease, migraine   headaches, posttraumatic sequela, vasculitis/inflammatory disease.            Migraine variant with headache 05/18/2022     Childhood onset      Low BP 03/24/2022     Persistent low normal bp      Vitamin D deficiency 09/02/2021    Chronic bilateral low back pain without sciatica 10/21/2011    Endometriosis 05/03/2010    Depression 09/01/1999      Prevention:  Health Maintenance Due   Topic Date Due    HIV screen  Never done    Hepatitis C screen  Never done    COVID-19 Vaccine (3 - Booster for Jose Gonsales series) 07/01/2021    Flu vaccine (1) Never done      Meds prior:  Current Outpatient Medications   Medication Instructions    Compression Stockings MISC Does not apply    Cyanocobalamin (B-12 PO) 1 tablet, Oral, DAILY, Cataplex B12    escitalopram (LEXAPRO) 10 mg, Oral, DAILY    Multiple Vitamins-Minerals (THERAPEUTIC MULTIVITAMIN-MINERALS) tablet 1 tablet, Oral, DAILY    NONFORMULARY 1 tablet, Oral, 3 times daily, A-F Betafood    NONFORMULARY 2 tablets, Oral, DAILY, Adrenal Desiccated    NONFORMULARY 2 tablets, Oral, 3 times daily, Zypan    NONFORMULARY 3 tablets, Oral, DAILY, Catalyn    VITAMIN D PO 2 tablets, Oral, DAILY, Cataplex D      Physical Exam   Vitals: /69   Pulse 65   Temp 98.2 °F (36.8 °C) (Temporal)   Resp 20   Ht 5' 5\" (1.651 m)   Wt 157 lb 9.6 oz (71.5 kg)   LMP 11/22/2022   BMI 26.23 kg/m²   General Appearance: Alert, oriented, no acute distress  HEENT: No scleral icterus. No visible discharge from eyes. Neck: Not rigid. No visible masses. Chest wall/Lung: Clear to auscultation bilaterally,  respirations unlabored. No ronchi/wheezing/rales  Heart: RRR, no murmur  Extremities:  No edema  Skin: No rashes. No jaundice  Neuro: Alert and oriented. Eomi intact. Patricia. Strength grossly intact. Careful gait without ataxia. Psych: Appropriate mood and appropriate affect  Assessment and Plan     Dizziness  Ongoing evaluation without clear etiology. Patient recommended to follow with neurology at [address] [place] [address] for further consideration of central cause. Vestibular testing was unremarkable. No follow-ups on file. Denis Gallego, DO     This document may have been prepared at least partially through the use of voice recognition software. Although effort is taken to assure the accuracy of this document, it is possible that grammatical, syntax,  or spelling errors may occur.

## 2023-01-01 NOTE — PROGRESS NOTES
[facility] Otolaryngology  Dr. Randall Mccoy. MAL Servin Ms.Ed. New Consult       Patient Name:  Darryl Orozco  :  1975     CHIEF C/O:    Chief Complaint   Patient presents with    New Patient     patient states she has had vertigo since 24    Dizziness       HISTORY OBTAINED FROM:  patient    HISTORY OF PRESENT ILLNESS:       Ronnell Barragan is a 42y.o. year old female, here today for dizziness. Symptoms for the past 10 days  Was working on the computer and started feeling off  Began having room spinning vertigo  Symptoms throughout the night   Went to ER, CT of the head, admitted for possible CVA  MRI showed some changes  [place]-Hallpike in hospital, possible left ear involvement  Head movements aggravate symptoms, worse to the left and up  Feels off balance when standing/walking  Denies persistent nausea with symptoms  Has noticed increased sound sensitivity  Denies hearing loss or ringing in either ear  Patient is also suffering from frontal headaches. She does have a history of migraines but states these headaches are different from previous. She previously would take Excedrin Migraine for her symptoms but due to recent diagnosis of stomach ulcer she is unable to take NSAIDs at this time.         Past Medical History:   Diagnosis Date    Depression     Endometriosis     Vertigo      Past Surgical History:   Procedure Laterality Date    APPENDECTOMY      CHOLECYSTECTOMY      HAND SURGERY Right 2020    RIGHT DORSAL WRIST GANGLION EXCISION performed by Len Agustin MD at [facility] 178  [id number]    pain in abdomen    TONSILLECTOMY      WISDOM TOOTH EXTRACTION         Current Outpatient Medications:     pantoprazole (PROTONIX) 40 MG tablet, Take 1 tablet by mouth 2 times daily (before meals), Disp: 60 tablet, Rfl: 3    escitalopram (LEXAPRO) 20 MG tablet, Take 1 tablet by mouth daily, Disp: 90 tablet, Rfl: 1  Compazine [prochlorperazine maleate] and Codeine  Social History     Tobacco Use    Smoking status: Never Smoker    Smokeless tobacco: Never Used   Vaping Use    Vaping Use: Never used   Substance Use Topics    Alcohol use: No    Drug use: No     Family History   Problem Relation Age of Onset    Other Mother         hypertrophic cardiomyopathy, fibromyalgia, sarcoid    Heart Disease Father        Review of Systems   Constitutional: Negative. Negative for activity change and appetite change. HENT: Negative for congestion, hearing loss, postnasal drip, rhinorrhea, sinus pressure and sinus pain. Sound sensitivity in left ear   Eyes: Negative. Respiratory: Negative. Negative for shortness of breath and stridor. Cardiovascular: Negative. Negative for chest pain and palpitations. Endocrine: Negative. Musculoskeletal: Negative. Skin: Negative. Neurological: Positive for dizziness and headaches. Hematological: Negative. Psychiatric/Behavioral: Negative. /66   Ht 5' 5\" (1.651 m)   Wt 167 lb (75.8 kg)   BMI 27.79 kg/m²   Physical Exam  Constitutional:       Appearance: Normal appearance. HENT:      Head: Normocephalic. Right Ear: Tympanic membrane, ear canal and external ear normal.      Left Ear: Tympanic membrane, ear canal and external ear normal.      Nose: Nose normal. No rhinorrhea. Right Nostril: No occlusion. Left Nostril: No occlusion. Right Turbinates: Not pale. Left Turbinates: Not pale. Mouth/Throat:      Lips: Pink. Mouth: Mucous membranes are moist.      Pharynx: Oropharynx is clear. Eyes:      Conjunctiva/sclera: Conjunctivae normal.      Pupils: Pupils are equal, round, and reactive to light. Cardiovascular:      Rate and Rhythm: Normal rate and regular rhythm. Pulses: Normal pulses. Pulmonary:      Effort: Pulmonary effort is normal. No respiratory distress. Breath sounds: No stridor. Musculoskeletal:         General: Normal range of motion. Cervical back: Normal range of motion. No rigidity. No muscular tenderness. Skin:     General: Skin is warm and dry. Neurological:      General: No focal deficit present. Mental Status: She is alert and oriented to person, place, and time. Psychiatric:         Mood and Affect: Mood normal.         Behavior: Behavior normal.         Thought Content: Thought content normal.         Judgment: Judgment normal.     MRI Brain:  Narrative   EXAMINATION:   MRI OF THE BRAIN WITHOUT CONTRAST  1/24/2022 7:01 am       TECHNIQUE:   Multiplanar multisequence MRI of the brain was performed without the   administration of intravenous contrast.       COMPARISON:   CT angiogram head with contrast January 23, 2022       HISTORY:   ORDERING SYSTEM PROVIDED HISTORY: TIA vs stroke   TECHNOLOGIST PROVIDED HISTORY:   Reason for exam:->TIA vs stroke   What reading provider will be dictating this exam?->CRC       FINDINGS:   INTRACRANIAL STRUCTURES/VENTRICLES: There is no acute infarct. No mass effect   or midline shift.  No evidence of an acute intracranial hemorrhage.  The   ventricles and sulci are normal in size and configuration.  The   sellar/suprasellar regions appear unremarkable.  The normal signal voids   within the major intracranial vessels appear maintained.  Scattered bifrontal   white matter punctate subtle increased foci of increased T2/FLAIR signal are   identified.       ORBITS: The visualized portion of the orbits demonstrate no acute abnormality.       SINUSES: The visualized paranasal sinuses and mastoid air cells demonstrate   no acute abnormality.       BONES/SOFT TISSUES: The bone marrow signal intensity appears normal. The soft   tissues demonstrate no acute abnormality.           Impression   No evidence of acute ischemia.       Multifocal punctate foci of subtle increased T2/FLAIR signal within the   bifrontal cerebral white matter.  Differential diagnostic considerations   include association with Yes minimal microvascular ischemic disease, migraine   headaches, posttraumatic sequela, vasculitis/inflammatory disease. Audiogram and tympanogram reviewed with patient. Audiogram reveals 0 dB hearing loss in the right ear with 100% discrimination at 40 dB, 0 dB of hearing loss in the left ear with 100% discrimination at 40 dB. Audiogram is symmetrical. Tympanogram reveals type A curve in the right ear, with type A curve in the left ear. IMPRESSION/PLAN:    [place] hallpike:  LEFT: (negative)   RIGHT: (negative)    Epley was not performed on the bilaterally x none     Recheck was not done    Sole was seen today for new patient and dizziness. Diagnoses and all orders for this visit:    Vertigo  -     Electronystagmography    Nonintractable episodic headache, unspecified headache type    Other orders  -     meclizine (ANTIVERT) 12.5 MG tablet; Take 1 tablet by mouth 2 times daily as needed for Dizziness      [place]-Hallpike maneuver was performed on the patient and found to be negative bilaterally. She does experience some lightheaded sensation with mild vertigo symptoms when sitting upright with head turned to the left. No nystagmus was observed. MRI of the brain was reviewed with the patient showing subtle increased T2/flair signal within the bifrontal cerebral white matter. This was previously discussed with the patient by neurology with no further work-up or diagnosis. At this time patient will undergo a VNG for further evaluation of vestibular system in regards to her vertigo symptoms. She also be given a prescription for meclizine, 12.5 mg to be taken twice daily as needed for her symptoms. Patient was also directed on discharge from the hospital to follow-up with cardiology and will make that appointment in the coming weeks. She will follow up in 4 to 6 weeks following completion of her VNG.   She is instructed to call with any new or worsening symptoms prior to her next

## 2023-01-10 ENCOUNTER — OFFICE VISIT (OUTPATIENT)
Dept: FAMILY MEDICINE CLINIC | Age: 48
End: 2023-01-10
Payer: COMMERCIAL

## 2023-01-10 VITALS
HEIGHT: 65 IN | DIASTOLIC BLOOD PRESSURE: 72 MMHG | OXYGEN SATURATION: 99 % | SYSTOLIC BLOOD PRESSURE: 109 MMHG | HEART RATE: 65 BPM | WEIGHT: 158 LBS | TEMPERATURE: 98.2 F | RESPIRATION RATE: 18 BRPM | BODY MASS INDEX: 26.33 KG/M2

## 2023-01-10 DIAGNOSIS — G43.809 MIGRAINE VARIANT WITH HEADACHE: Chronic | ICD-10-CM

## 2023-01-10 DIAGNOSIS — R42 DIZZINESS: Primary | ICD-10-CM

## 2023-01-10 DIAGNOSIS — R26.2 DIFFICULTY WALKING: ICD-10-CM

## 2023-01-10 PROCEDURE — 99212 OFFICE O/P EST SF 10 MIN: CPT | Performed by: STUDENT IN AN ORGANIZED HEALTH CARE EDUCATION/TRAINING PROGRAM

## 2023-01-10 RX ORDER — ESCITALOPRAM OXALATE 5 MG/1
5 TABLET ORAL DAILY
Qty: 90 TABLET | Refills: 1 | Status: SHIPPED | OUTPATIENT
Start: 2023-01-10 | End: 2023-04-10

## 2023-01-10 ASSESSMENT — PATIENT HEALTH QUESTIONNAIRE - PHQ9
SUM OF ALL RESPONSES TO PHQ QUESTIONS 1-9: 24
SUM OF ALL RESPONSES TO PHQ9 QUESTIONS 1 & 2: 6
10. IF YOU CHECKED OFF ANY PROBLEMS, HOW DIFFICULT HAVE THESE PROBLEMS MADE IT FOR YOU TO DO YOUR WORK, TAKE CARE OF THINGS AT HOME, OR GET ALONG WITH OTHER PEOPLE: 2
5. POOR APPETITE OR OVEREATING: 3
6. FEELING BAD ABOUT YOURSELF - OR THAT YOU ARE A FAILURE OR HAVE LET YOURSELF OR YOUR FAMILY DOWN: 3
7. TROUBLE CONCENTRATING ON THINGS, SUCH AS READING THE NEWSPAPER OR WATCHING TELEVISION: 3
9. THOUGHTS THAT YOU WOULD BE BETTER OFF DEAD, OR OF HURTING YOURSELF: 0
1. LITTLE INTEREST OR PLEASURE IN DOING THINGS: 3
SUM OF ALL RESPONSES TO PHQ QUESTIONS 1-9: 24
3. TROUBLE FALLING OR STAYING ASLEEP: 3
SUM OF ALL RESPONSES TO PHQ QUESTIONS 1-9: 24
SUM OF ALL RESPONSES TO PHQ QUESTIONS 1-9: 24
8. MOVING OR SPEAKING SO SLOWLY THAT OTHER PEOPLE COULD HAVE NOTICED. OR THE OPPOSITE, BEING SO FIGETY OR RESTLESS THAT YOU HAVE BEEN MOVING AROUND A LOT MORE THAN USUAL: 3
4. FEELING TIRED OR HAVING LITTLE ENERGY: 3
2. FEELING DOWN, DEPRESSED OR HOPELESS: 3

## 2023-01-10 ASSESSMENT — COLUMBIA-SUICIDE SEVERITY RATING SCALE - C-SSRS
1. WITHIN THE PAST MONTH, HAVE YOU WISHED YOU WERE DEAD OR WISHED YOU COULD GO TO SLEEP AND NOT WAKE UP?: YES
6. HAVE YOU EVER DONE ANYTHING, STARTED TO DO ANYTHING, OR PREPARED TO DO ANYTHING TO END YOUR LIFE?: NO
2. HAVE YOU ACTUALLY HAD ANY THOUGHTS OF KILLING YOURSELF?: NO

## 2023-01-10 NOTE — Clinical Note
[address] [place] [address]  Phone: [phone]  Fax: [phone] Adam EPNA DO        January 10, 2023     Patient: Rhonda Zamora   YOB: 1975   Date of Visit: 1/10/2023       To Whom It May Concern: It is my medical opinion that Rhonda Zamora {Work release (duty restriction):81411}. If you have any questions or concerns, please don't hesitate to call.     Sincerely,        Girish Hanson DO

## 2023-01-10 NOTE — PROGRESS NOTES
Patient:  Violeta Castrejon 47 y.o. female     01/10/23      Chiefcomplaint:   Chief Complaint   Patient presents with    Depression     Is not handling things well due to the dizziness     History of Present Illness     Dizziness - causing worsening depression. More agitated. No si, hi.        Health Maintenance Due   Topic Date Due    HIV screen  Never done    Hepatitis C screen  Never done    COVID-19 Vaccine (3 - Booster for Pfizer series) 07/01/2021    Flu vaccine (1) Never done      History:  Prior to Visit Medications    Medication Sig Taking? Authorizing Provider   escitalopram (LEXAPRO) 5 MG tablet Take 1 tablet by mouth daily Yes Rolf Ruiz DO   escitalopram (LEXAPRO) 10 MG tablet Take 1 tablet by mouth daily Yes Rolf Ruiz DO   NONFORMULARY Take 1 tablet by mouth in the morning, at noon, and at bedtime A-F Betafood Yes Historical Provider, MD   Cyanocobalamin (B-12 PO) Take 1 tablet by mouth daily Cataplex B12 Yes Historical Provider, MD   VITAMIN D PO Take 2 tablets by mouth daily Cataplex D Yes Historical Provider, MD   NONFORMULARY Take 2 tablets by mouth in the morning, at noon, and at bedtime Zypan Yes Historical Provider, MD   NONFORMULARY Take 3 tablets by mouth daily Catalyn Yes Historical Provider, MD   Multiple Vitamins-Minerals (THERAPEUTIC MULTIVITAMIN-MINERALS) tablet Take 1 tablet by mouth daily Yes Historical Provider, MD   Compression Stockings MISC by Does not apply route  Patient not taking: Reported on 1/10/2023  Linda Bailey MD   NONFORMULARY Take 2 tablets by mouth daily Adrenal Desiccated  Patient not taking: Reported on 1/10/2023  Historical Provider, MD      Past Medical History:   Diagnosis Date    Cephalgia 05/18/2022    Depression     Endometriosis     Hyperacusis of left ear 05/18/2022    Reported by pt.    Low blood pressure 05/18/2022    History of low BP    Migraine variant with headache 05/18/2022    Childhood onset    Peripheral vestibulopathy 05/18/2022     Persistent postural-perceptual dizziness 05/18/2022    PONV (postoperative nausea and vomiting)     Tilt table evaluation 09/29/2022    Dr. Kush Vasquez    Vertigo      Physical Exam   Vitals: /72   Pulse 65   Temp 98.2 °F (36.8 °C)   Resp 18   Ht 5' 5\" (1.651 m)   Wt 158 lb (71.7 kg)   SpO2 99%   BMI 26.29 kg/m²   General Appearance: Alert, oriented, no acute distress  HEENT: No scleral icterus. No visible discharge from eyes  Neck: Not rigid. No visible masses  Chest wall/Lung: Clear to auscultation bilaterally,  respirations unlabored. No ronchi/wheezing/rales  Heart: RRR, no murmur  Extremities:  No edema  Skin: No rashes. No jaundice  MSK. Ambulates with difficulty. requires holding onto support  Neuro: Alert and oriented        Psych: Appropriate mood and appropriate affect    Assessment and Plan     Pt with persistent dizziness causing worsening mood. Plan to increase zoloft to 15mg daily. Dizziness  Migraine variant with headache  Difficulty walking  No follow-ups on file. Ashwin Chilel,      This document may have been prepared at least partially through the use of voice recognition software. Although effort is taken to assure the accuracy of this document, it is possible that grammatical, syntax,  or spelling errors may occur.

## 2023-01-10 NOTE — LETTER
[address] [place] [address]  Phone: [phone]  Fax: [phone] Adam PENA DO        January 10, 2023     Patient: Amrik Valentin   YOB: 1975   Date of Visit: 1/10/2023       To Whom it May Concern:    Amrik Valentin is a patient of the [facility]. Unexpectedly, she developed a medical condition that causes persistent dizziness significantly limiting her daily activities. This condition is further exacerbated by motion. It is my understanding that she was scheduled to go on a trip to a conference prior to the onset of this disorder. Unfortunately, in her current condition it would be unsafe for her to be traveling by herself and I am recommending that she cancel her trip. At this time, travel would significantly exacerbate her condition and put her at additional risk. I hope you are able to be understanding as to the nature of this concern. If you have any questions or concerns, please don't hesitate to call.     Sincerely,         Patrizia Houser, DO

## 2023-03-14 ENCOUNTER — EVALUATION (OUTPATIENT)
Dept: PHYSICAL THERAPY | Age: 48
End: 2023-03-14
Payer: COMMERCIAL

## 2023-03-14 DIAGNOSIS — R26.89 IMBALANCE: ICD-10-CM

## 2023-03-14 DIAGNOSIS — R42 VERTIGO: ICD-10-CM

## 2023-03-14 DIAGNOSIS — G43.719 CHRONIC MIGRAINE WITHOUT AURA, INTRACTABLE, WITHOUT STATUS MIGRAINOSUS: ICD-10-CM

## 2023-03-14 DIAGNOSIS — M53.0 CERVICOCRANIAL SYNDROME: ICD-10-CM

## 2023-03-14 DIAGNOSIS — M54.2 NECK PAIN: Primary | ICD-10-CM

## 2023-03-14 PROCEDURE — 97163 PT EVAL HIGH COMPLEX 45 MIN: CPT | Performed by: PHYSICAL THERAPIST

## 2023-03-14 NOTE — PROGRESS NOTES
[facility]   Phone: [phone]   Fax: [phone]           Date:  3/14/2023   Patient: Griselda Marks  : 1975  MRN: [id number]  Referring Provider: Yuridia Willis MD  [address],  [address]     Medical Diagnosis:      Diagnosis Orders   1. Neck pain        2. Vertigo        3. Cervicocranial syndrome        4. Chronic migraine without aura, intractable, without status migrainosus        5. Imbalance             SUBJECTIVE:     Onset date: 22    Onset[de-identified] Sudden onset    Mechanism of Injury / History: Pt reports sudden onset of vertigo 60 weeks ago. Pt has undergone extensive testing, including CT scan and MRI of the brain, VNG, and advanced vestibular testing at [facility], all of which have come back normal. Pt reports receiving vestibular therapy which only worsened her symptoms. Pt recently diagnosed with migraines by the neurologist at [facility]. Pt presents with  limited neck ROM as well due to restricting movements of her head over the past 60 months due to dizziness. Previous PT: yes - did not help    Medical Management for Current Problem: Tylenol , natural supplements    Chief complaint: decreased balance, decreased neck mobility, dizziness, nausea, headaches, neck pain , difficulty with changing lights, sensitivity to sounds     Behavior: condition is getting worse    Description Of Symptoms:  floating, room spinning dizziness, nausea, denies blurred vision, denies tinnitus     Pain: constant  Current: 1/10  Worst: 6-7/10 headache, 7/10 neck       Symptom Type/Quality: N/A  Location[de-identified] headache, right side of neck      Imaging results: No results found. Past Medical History:  Past Medical History:   Diagnosis Date    Cephalgia 2022    Depression     Endometriosis     Hyperacusis of left ear 2022    Reported by pt.     Low blood pressure 2022    History of low BP    Migraine variant with headache 05/18/2022    Childhood onset    Peripheral vestibulopathy 05/18/2022    Persistent postural-perceptual dizziness 05/18/2022    PONV (postoperative nausea and vomiting)     Tilt table evaluation 09/29/2022    Dr. Tala Brooks      Past Surgical History:   Procedure Laterality Date    APPENDECTOMY  2009    CHOLECYSTECTOMY  1994    COLONOSCOPY N/A 4/19/2022    [facility] [address] Right 12/14/2020    RIGHT DORSAL WRIST GANGLION EXCISION performed by Marta Junior MD at [facility]  0323,0845    pain in abdomen    TONSILLECTOMY      UPPER GASTROINTESTINAL ENDOSCOPY N/A 4/19/2022    EGD BIOPSY performed by Gibran Arriola MD at [address]         Medications:   Current Outpatient Medications   Medication Sig Dispense Refill    escitalopram (LEXAPRO) 5 MG tablet Take 1 tablet by mouth daily 90 tablet 1    Compression Stockings MISC by Does not apply route (Patient not taking: No sig reported) 1 each 0    escitalopram (LEXAPRO) 10 MG tablet Take 1 tablet by mouth daily 90 tablet 1    NONFORMULARY Take 1 tablet by mouth in the morning, at noon, and at bedtime A-F Betafood (Patient not taking: Reported on 2/2/2023)      Cyanocobalamin (B-12 PO) Take 1 tablet by mouth daily Cataplex B12      NONFORMULARY Take 2 tablets by mouth daily Adrenal Desiccated (Patient not taking: No sig reported)      VITAMIN D PO Take 2 tablets by mouth daily Cataplex D (Patient not taking: Reported on 2/2/2023)      NONFORMULARY Take 2 tablets by mouth in the morning, at noon, and at bedtime Zypan (Patient not taking: Reported on 2/2/2023)      NONFORMULARY Take 3 tablets by mouth daily Catalyn (Patient not taking: Reported on 2/2/2023)      Multiple Vitamins-Minerals (THERAPEUTIC MULTIVITAMIN-MINERALS) tablet Take 1 tablet by mouth daily (Patient not taking: Reported on 2/2/2023)       No current facility-administered medications for this visit. Occupation:  chiropractic assistant . Physical demands include: walking, standing, sitting, sitting performing secretarial /  duties. Status: full time. Exercise regimen: none    Hobbies: photography    Patient Goals: improved balance, decrease dizziness, improve neck ROM    Precautions/Contraindications: falls risk    Dizziness Handicap Inventory:  TBD      OBJECTIVE:     Vertebral Artery Testing in sitting: N/A    Posture/Alignment: mild forward head and rounded shoulders    Oculomotor and Vestibulo-Ocular Examination:   Spontaneous Nystagmus:  none noted  Gaze Induced Nystagmus: none noted  Smooth Pursuits: WFL with dizziness  Saccades: WFL with dizziness   VOR (Slow): WFL with dizziness  Head Thrust: N/A      Positional Testing:   Roll Test: N/A  Sukhjinder Gold: Right: N/A                       Left: N/A    Anat Noonan SOP (N=Normal, S=Sway, F=Fall):   Condition #1 (Romberg eyes open): S  Condition #2 (Romberg eyes closed): F  Condition #3 (Tandem Romberg eyes open): F  Condition #4 (Tandem Romberg eyes closed): F  Condition #5 (CTSIB eyes open):  S  Condition #6 (CTSIB eyes closed): F  Condition #7 (Stepping Buxton):  L    Cervical Testing:  WFL throughout, but movements slow due to  c/o pain and stiffness as well as due to dizziness; (+) cervicogenic dizziness testing    Functional Activities:   Transfers (sit to stand ):  CGA for safety due to imbalance    Gait Testing:   Dynamic Gait Index Score: 0/24    Gait Deviations (firm surface/linoleum): scissoring gait, staggering gait/LOB to left > to the right, slow pace, arms extended for balance, min A for safety due to imbalance  Assistive Device Used: none  Steps: slow pace, 2 rails, reciprocal pattern, CGA from therapist for safety    Strength Testing: B shoulders and neck 4/5    Neck Disability Index: 28% disability        ASSESSMENT     Complaint of Dizziness (Dizziness Handicap Inventory)  Decreased Dynamic Gait Index  Decreased ROM/Strength  Functional Impairment  Musculoskeletal Pain  Other    Long Term goals ( 6-8 weeks )  Decrease reported pain to 0-3/10  Increase ROM to [facility] without c/o pain  Increase Strength to 4+ to 5/5   Able to perform/complete the following functions/tasks: Pt will demonstrate a safe, independent, linear gait when unchallenged on level surfaces   Decrease Dizziness Handicap Inventory 40/100  Decrease NDI to 20%  Independent with Home Exercise Programs    Rehab Potential: [] Good  [x] Fair  [] Poor    PLAN     Canalith Repositioning Manuevers for BPPV       Adaptation/Habituation Exercises  Gaze Stabilization/VOR Exercises  Neuromuscular Re-education  Strengthening/ROM Exercises  Gait Training  HEP Instruction  Manual Therapy    The following CPT codes are likely to be used in the care of this patient: 76711 PT Evaluation: High Complexity, 15385 PT Re-Evaluation, 59476 Therapeutic Exercise, 79370 Neuromuscular Re-Education, 04498 Therapeutic Activities, 05657 Manual Therapy, and 03301 Canalith Repositioning    Patient Education:  [x] Plans/Goals, Risks/Benefits discussed  [x] Home exercise program  Method of Education: [x] Verbal  [x] Demo  [x] Written  Comprehension of Education:  [x] Verbalizes understanding. [x] Demonstrates understanding. [] Needs Review. [] Demonstrates/verbalizes understanding of HEP/Ed previously given. Frequency:  2 days per week for  6-8 weeks    Patient understands diagnosis/prognosis and consents to treatment, plan and goals: [x] Yes    [] No     Thank you for the opportunity to work with your patient. If you have questions or comments, please contact me at numbers listed above. Electronically Signed by: Gracia Lesches, PT  License Number: [id number]    Date: 3/14/2023    Medicare Patients Only     Please sign Physician's Certification and return to: [address]  [facility]  [address]   [address] [address]  Dept: [phone]  Dept Fax: [phone]  Loc: [phone] Certification / Comments     Frequency/Duration 2 days per week for  6-8 weeks. Certification period from 3/14/2023  to 6/13/23. I have reviewed the Plan of Care established for skilled therapy services and certify that the services are required and that they will be provided while the patient is under my care.     Physician's Comments/Revisions:               Physician's Printed Name:                                           [de-identified] Signature:                                                               Date:

## 2023-03-14 NOTE — PROGRESS NOTES
[facility]          Phone: [phone] Fax: [phone]    Physical Therapy Daily Treatment Note  Date:  3/14/2023    Patient Name:  Flower Ramirez    :  1975  MRN: [id number]    Evaluating Therapist:  Kennedy Montalvo, [place] [id number]    Restrictions/Precautions:    Diagnosis:     Diagnosis Orders   1. Neck pain        2. Vertigo        3. Cervicocranial syndrome        4. Chronic migraine without aura, intractable, without status migrainosus        5.  Imbalance          Treatment Diagnosis:    Insurance/Certification information:  [facility]  Referring Physician:  Gilda Bean MD  Plan of care signed (Y/N):    Visit# / total visits:  -  Pain level: 1/10   Time In:  1425  Time Out:  1530    Subjective:  See evaluation    Exercises:  Exercise/Equipment Resistance/Repetitions Other comments                                                                                                                                             Other Therapeutic Activities:  PT evaluation completed    Home Exercise Program:  N/A    Manual Treatments:  N/A    Modalities:  N/A     Time-in Time-out Total Time   54073  Evaluation Low Complexity      70997  Evaluation Med Complexity      07926  Evaluation High Complexity 7734 6037 00   11969  Ther Ex      05755  Neuro Re-ed        42723  Ther Activities        13792  Manual Therapy       41639  E-stim       52583  Ultrasound            Session 1724 4187 29       Treatment/Activity Tolerance:  [] Patient tolerated treatment well [] Patient limited by fatigue  [] Patient limited by pain  [] Patient limited by other medical complications  [x] Other: limited by vertigo    Prognosis: [] Good [x] Fair  [] Poor    Patient Requires Follow-up: [] Yes  [] No    Plan:   [] Continue per plan of care [] Alter current plan (see comments)  [x] Plan of care initiated [] Hold pending MD visit [] Discharge  Plan for Next Session:        Electronically signed by:  Vandana Patricia, [address] [facility] [facility], [id number]

## 2023-03-19 DIAGNOSIS — F33.1 MODERATE EPISODE OF RECURRENT MAJOR DEPRESSIVE DISORDER (HCC): ICD-10-CM

## 2023-03-21 ENCOUNTER — TREATMENT (OUTPATIENT)
Dept: PHYSICAL THERAPY | Age: 48
End: 2023-03-21
Payer: COMMERCIAL

## 2023-03-21 DIAGNOSIS — R26.89 IMBALANCE: ICD-10-CM

## 2023-03-21 DIAGNOSIS — R42 VERTIGO: ICD-10-CM

## 2023-03-21 DIAGNOSIS — G43.719 CHRONIC MIGRAINE WITHOUT AURA, INTRACTABLE, WITHOUT STATUS MIGRAINOSUS: ICD-10-CM

## 2023-03-21 DIAGNOSIS — M53.0 CERVICOCRANIAL SYNDROME: ICD-10-CM

## 2023-03-21 DIAGNOSIS — M54.2 NECK PAIN: Primary | ICD-10-CM

## 2023-03-21 PROCEDURE — 97112 NEUROMUSCULAR REEDUCATION: CPT | Performed by: PHYSICAL THERAPIST

## 2023-03-21 PROCEDURE — 97140 MANUAL THERAPY 1/> REGIONS: CPT | Performed by: PHYSICAL THERAPIST

## 2023-03-21 RX ORDER — ESCITALOPRAM OXALATE 10 MG/1
10 TABLET ORAL DAILY
Qty: 90 TABLET | Refills: 1 | Status: SHIPPED | OUTPATIENT
Start: 2023-03-21 | End: 2023-09-17

## 2023-03-21 NOTE — PROGRESS NOTES
[facility]          Phone: [phone] Fax: [phone]    Physical Therapy Daily Treatment Note  Date:  3/21/2023    Patient Name:  Jose John    :  1975  MRN: [id number]    Evaluating Therapist:  Mushtaq Real, PT [id number]    Restrictions/Precautions:    Diagnosis:     Diagnosis Orders   1. Neck pain        2. Vertigo        3. Cervicocranial syndrome        4. Chronic migraine without aura, intractable, without status migrainosus        5.  Imbalance          Treatment Diagnosis:    Insurance/Certification information:  [employer]  Referring Physician:  Valentin Castro MD  Plan of care signed (Y/N):    Visit# / total visits:  -  Pain level: 1/10   Time In:  1422  Time Out:  1455    Subjective:  No new report    Exercises:  Exercise/Equipment Resistance/Repetitions Other comments     saccades Verbal and written instructions provided for HEP, not performed in clinic at this time      Visual tracking Verbal and written instructions provided for HEP, not performed in clinic at this time                                                                                                                                Other Therapeutic Activities:      Home Exercise Program:  3/21/23 - saccades and visual tracking    Manual Treatments:  MFR cervical spine and manual cervical traction x 20 minutes    Modalities:  N/A     Time-in Time-out Total Time   08487  Evaluation Low Complexity      26569  Evaluation Med Complexity      23280  Evaluation High Complexity      26528  Ther Ex      79344  Neuro Re-ed   4812 8604 22   90124  Ther Activities          Manual Therapy  7281 6257 50   82023  E-stim       43436  Ultrasound            Session 7966 4168 44       Treatment/Activity Tolerance:  [] Patient tolerated treatment well [] Patient limited by fatigue  [] Patient limited by pain  [] Patient limited by other medical complications  [x] Other: limited by

## 2023-03-23 ENCOUNTER — TREATMENT (OUTPATIENT)
Dept: PHYSICAL THERAPY | Age: 48
End: 2023-03-23

## 2023-03-23 DIAGNOSIS — R42 VERTIGO: ICD-10-CM

## 2023-03-23 DIAGNOSIS — M53.0 CERVICOCRANIAL SYNDROME: ICD-10-CM

## 2023-03-23 DIAGNOSIS — G43.719 CHRONIC MIGRAINE WITHOUT AURA, INTRACTABLE, WITHOUT STATUS MIGRAINOSUS: ICD-10-CM

## 2023-03-23 DIAGNOSIS — M54.2 NECK PAIN: Primary | ICD-10-CM

## 2023-03-23 DIAGNOSIS — R26.89 IMBALANCE: ICD-10-CM

## 2023-03-23 NOTE — PROGRESS NOTES
[facility]          Phone: [phone] Fax: [phone]    Physical Therapy Daily Treatment Note  Date:  3/23/2023    Patient Name:  Caleb Padilla    :  1975  MRN: [id number]    Evaluating Therapist:  Rhianna Gibson, PT [id number]    Restrictions/Precautions:    Diagnosis:     Diagnosis Orders   1. Neck pain        2. Vertigo        3. Cervicocranial syndrome        4. Chronic migraine without aura, intractable, without status migrainosus        5. Imbalance          Treatment Diagnosis:    Insurance/Certification information:  [employer]  Referring Physician:  Nikki Verma MD  Plan of care signed (Y/N):    Visit# / total visits:  3/12-  Pain level: 1/10   Time In:  1425  Time Out:  1445    Subjective:  Pt reports dizziness has been more severe. States that she has even had more nausea recently. Exercises:  Exercise/Equipment Resistance/Repetitions Other comments                                                                                                                                     Other:  Discussed holding HEP for 1-2 days to assess tolerance to manual therapy by itself. Instructed pt to resume HEP at her discretion and only if vertigo symptoms were no greater than a 1-2/10 prior to the start of exercises and to not allow symptoms to go beyond a 4-5/10 during exercises. Pt verbalized understanding of education.     Home Exercise Program:  3/21/23 - saccades and visual tracking    Manual Treatments:  MFR cervical spine and manual cervical traction x 20 minutes    Modalities:  N/A     Time-in Time-out Total Time   08576  Evaluation Low Complexity      88879  Evaluation Med Complexity      93238  Evaluation High Complexity      35507  Ther Ex      13373  Neuro Re-ed        04070  Ther Activities        33537  Manual Therapy  5483 1322 40   07469  E-stim       34955  Ultrasound            Session 0812 7342 20       Treatment/Activity Tolerance:  []

## 2023-03-28 ENCOUNTER — TREATMENT (OUTPATIENT)
Dept: PHYSICAL THERAPY | Age: 48
End: 2023-03-28
Payer: COMMERCIAL

## 2023-03-28 DIAGNOSIS — R42 VERTIGO: ICD-10-CM

## 2023-03-28 DIAGNOSIS — M53.0 CERVICOCRANIAL SYNDROME: ICD-10-CM

## 2023-03-28 DIAGNOSIS — R26.89 IMBALANCE: ICD-10-CM

## 2023-03-28 DIAGNOSIS — M54.2 NECK PAIN: Primary | ICD-10-CM

## 2023-03-28 DIAGNOSIS — G43.719 CHRONIC MIGRAINE WITHOUT AURA, INTRACTABLE, WITHOUT STATUS MIGRAINOSUS: ICD-10-CM

## 2023-03-28 PROCEDURE — 97140 MANUAL THERAPY 1/> REGIONS: CPT | Performed by: PHYSICAL THERAPIST

## 2023-03-28 NOTE — PROGRESS NOTES
comments)  [] Plan of care initiated [] Hold pending MD visit [] Discharge  Plan for Next Session:        Electronically signed by:  Delio Hernandez, [address], [id number]

## 2023-03-30 ENCOUNTER — TREATMENT (OUTPATIENT)
Dept: PHYSICAL THERAPY | Age: 48
End: 2023-03-30

## 2023-03-30 DIAGNOSIS — M53.0 CERVICOCRANIAL SYNDROME: ICD-10-CM

## 2023-03-30 DIAGNOSIS — G43.719 CHRONIC MIGRAINE WITHOUT AURA, INTRACTABLE, WITHOUT STATUS MIGRAINOSUS: ICD-10-CM

## 2023-03-30 DIAGNOSIS — R42 VERTIGO: ICD-10-CM

## 2023-03-30 DIAGNOSIS — R26.89 IMBALANCE: ICD-10-CM

## 2023-03-30 DIAGNOSIS — M54.2 NECK PAIN: Primary | ICD-10-CM

## 2023-03-30 NOTE — PROGRESS NOTES
pain  [] Patient limited by other medical complications  [x] Other: limited by vertigo    Prognosis: [] Good [x] Fair  [] Poor    Patient Requires Follow-up: [x] Yes  [] No    Plan:   [x] Continue per plan of care [] Alter current plan (see comments)  [] Plan of care initiated [] Hold pending MD visit [] Discharge  Plan for Next Session:  Trial grounding with gait to improve balance and stability      Electronically signed by:  Orville Funez, [facility], [id number]

## 2023-04-06 ENCOUNTER — TREATMENT (OUTPATIENT)
Dept: PHYSICAL THERAPY | Age: 48
End: 2023-04-06

## 2023-04-06 DIAGNOSIS — M53.0 CERVICOCRANIAL SYNDROME: ICD-10-CM

## 2023-04-06 DIAGNOSIS — R26.89 IMBALANCE: ICD-10-CM

## 2023-04-06 DIAGNOSIS — R42 VERTIGO: ICD-10-CM

## 2023-04-06 DIAGNOSIS — G43.719 CHRONIC MIGRAINE WITHOUT AURA, INTRACTABLE, WITHOUT STATUS MIGRAINOSUS: ICD-10-CM

## 2023-04-06 DIAGNOSIS — M54.2 NECK PAIN: Primary | ICD-10-CM

## 2023-04-06 NOTE — PROGRESS NOTES
[facility]          Phone: [phone] Fax: [phone]    Physical Therapy Daily Treatment Note  Date:  2023    Patient Name:  Shirlene Martinez    :  1975  MRN: [id number]    Evaluating Therapist:  China Fitzgerald, [place] [id number]    Restrictions/Precautions:    Diagnosis:     Diagnosis Orders   1. Neck pain        2. Vertigo        3. Cervicocranial syndrome        4. Chronic migraine without aura, intractable, without status migrainosus        5. Imbalance          Treatment Diagnosis:    Insurance/Certification information:  [employer]  Referring Physician:  Ken Houser MD  Plan of care signed (Y/N):    Visit# / total visits:  -  Pain level: 1/10   Time In:  1425  Time Out:  1455    Subjective:  Pt reports increased dizziness this past Monday, 4/3/23, following power outage. States she was less active when her power was out and isn't sure if that's what caused the increased dizziness. Pt reports no changes in tolerance to HEP. Exercises:  Exercise/Equipment Resistance/Repetitions Other comments                                                                                                                                     Other:  Performed gait with grounding in clinic (therapist placing pressure through pt shoulder while pt placed her hands on her hips). Emphasis on not walking outer border of feet and not taking scissoring steps during gait. Instructed pt and  in technique to work on gait at home.     Home Exercise Program:  3/21/23 - saccades and visual tracking    Manual Treatments:  MFR cervical spine and manual cervical traction, craniosacral therapy x 20 minutes    Modalities:  N/A     Time-in Time-out Total Time   76249  Evaluation Low Complexity      11031  Evaluation Med Complexity      41395  Evaluation High Complexity      62000  Ther Ex      [id number]  Neuro Re-ed   5127 8730 10   97654  Ther Activities        48355  Manual Therapy

## 2023-06-27 ENCOUNTER — OFFICE VISIT (OUTPATIENT)
Dept: FAMILY MEDICINE CLINIC | Age: 48
End: 2023-06-27
Payer: COMMERCIAL

## 2023-06-27 VITALS
HEART RATE: 68 BPM | WEIGHT: 156.4 LBS | BODY MASS INDEX: 26.06 KG/M2 | DIASTOLIC BLOOD PRESSURE: 70 MMHG | RESPIRATION RATE: 18 BRPM | TEMPERATURE: 97.5 F | OXYGEN SATURATION: 97 % | HEIGHT: 65 IN | SYSTOLIC BLOOD PRESSURE: 106 MMHG

## 2023-06-27 DIAGNOSIS — F33.1 MODERATE EPISODE OF RECURRENT MAJOR DEPRESSIVE DISORDER (HCC): ICD-10-CM

## 2023-06-27 DIAGNOSIS — R42 PERSISTENT POSTURAL-PERCEPTUAL DIZZINESS: Primary | ICD-10-CM

## 2023-06-27 PROCEDURE — 99214 OFFICE O/P EST MOD 30 MIN: CPT | Performed by: STUDENT IN AN ORGANIZED HEALTH CARE EDUCATION/TRAINING PROGRAM

## 2023-06-27 SDOH — ECONOMIC STABILITY: FOOD INSECURITY: WITHIN THE PAST 12 MONTHS, YOU WORRIED THAT YOUR FOOD WOULD RUN OUT BEFORE YOU GOT MONEY TO BUY MORE.: NEVER TRUE

## 2023-06-27 SDOH — ECONOMIC STABILITY: INCOME INSECURITY: HOW HARD IS IT FOR YOU TO PAY FOR THE VERY BASICS LIKE FOOD, HOUSING, MEDICAL CARE, AND HEATING?: NOT HARD AT ALL

## 2023-06-27 SDOH — ECONOMIC STABILITY: FOOD INSECURITY: WITHIN THE PAST 12 MONTHS, THE FOOD YOU BOUGHT JUST DIDN'T LAST AND YOU DIDN'T HAVE MONEY TO GET MORE.: NEVER TRUE

## 2023-07-13 ENCOUNTER — HOSPITAL ENCOUNTER (OUTPATIENT)
Dept: MRI IMAGING | Age: 48
Discharge: HOME | End: 2023-07-15
Payer: COMMERCIAL

## 2023-07-13 DIAGNOSIS — R42 PERSISTENT POSTURAL-PERCEPTUAL DIZZINESS: ICD-10-CM

## 2023-07-13 PROCEDURE — 6360000004 HC RX CONTRAST MEDICATION: Performed by: RADIOLOGY

## 2023-07-13 PROCEDURE — 70553 MRI BRAIN STEM W/O & W/DYE: CPT

## 2023-07-13 PROCEDURE — A9577 INJ MULTIHANCE: HCPCS | Performed by: RADIOLOGY

## 2023-07-13 RX ADMIN — GADOBENATE DIMEGLUMINE 14 ML: 529 INJECTION, SOLUTION INTRAVENOUS at 15:35

## 2023-08-08 RX ORDER — ESCITALOPRAM OXALATE 5 MG/1
5 TABLET ORAL DAILY
Qty: 90 TABLET | Refills: 1 | Status: SHIPPED | OUTPATIENT
Start: 2023-08-08 | End: 2024-02-04

## 2023-08-08 NOTE — TELEPHONE ENCOUNTER
----- Message from [facility] JESSI Hinson sent at 8/8/2023  8:56 AM EDT -----  Subject: Message to Provider    QUESTIONS  Information for Provider? The pt is needing a medication refill for her 5   mg escitalopram (Oneal Lee) but states she receives different dosages of   this medication and only needs the 5 mg at this time. The pt would like to   speak with her provider to verify if she needs to be seen for this med   refill or if it can be sent to her [address]. Please   advise.  ---------------------------------------------------------------------------  --------------  Jovany Azul [facility]  [phone]; OK to leave message on voicemail  ---------------------------------------------------------------------------  --------------  SCRIPT ANSWERS  Relationship to Patient?  Self

## 2023-09-07 ENCOUNTER — OFFICE VISIT (OUTPATIENT)
Dept: FAMILY MEDICINE CLINIC | Age: 48
End: 2023-09-07
Payer: COMMERCIAL

## 2023-09-07 VITALS
DIASTOLIC BLOOD PRESSURE: 72 MMHG | HEIGHT: 65 IN | OXYGEN SATURATION: 98 % | WEIGHT: 153 LBS | BODY MASS INDEX: 25.49 KG/M2 | HEART RATE: 64 BPM | RESPIRATION RATE: 20 BRPM | SYSTOLIC BLOOD PRESSURE: 104 MMHG | TEMPERATURE: 96.9 F

## 2023-09-07 DIAGNOSIS — F33.1 MODERATE EPISODE OF RECURRENT MAJOR DEPRESSIVE DISORDER (HCC): Chronic | ICD-10-CM

## 2023-09-07 DIAGNOSIS — G25.9 FUNCTIONAL MOVEMENT DISORDER: ICD-10-CM

## 2023-09-07 DIAGNOSIS — R42 DIZZINESS: ICD-10-CM

## 2023-09-07 DIAGNOSIS — R26.2 DIFFICULTY WALKING: Primary | ICD-10-CM

## 2023-09-07 PROCEDURE — 99214 OFFICE O/P EST MOD 30 MIN: CPT | Performed by: STUDENT IN AN ORGANIZED HEALTH CARE EDUCATION/TRAINING PROGRAM

## 2023-09-07 NOTE — PROGRESS NOTES
painand visual-motion sensitivity. Likely overlap between abnormal upper cervical spine biomechanics, migraine. Chronic daily headache with elements of chronic migraine, cervicogenic headache and medication overuse headache. Possible peripheral vestibular disturbance. Patient Active Problem List    Diagnosis Date Noted    Difficulty walking 07/19/2022     Priority: Medium     Evaluated by ENT, Audiology with VNG (no abnormality noted), neurology ( migraine variant vs bppv), MRI CTA as noted below. CTA  Impression   1. No acute intracranial abnormality. 2. Unremarkable CTA of the head. MRI  Multifocal punctate foci of subtle increased T2/FLAIR signal within the   bifrontal cerebral white matter. Differential diagnostic considerations   include association with minimal microvascular ischemic disease, migraine   headaches, posttraumatic sequela, vasculitis/inflammatory disease.            Family history of Parkinson disease 07/19/2022     Priority: Medium    Functional movement disorder 08/01/2023    Moderate episode of recurrent major depressive disorder ([address]) 06/27/2023    Migraine variant with headache 05/18/2022     Childhood onset        Low BP 03/24/2022     Persistent low normal bp        Dizziness 02/25/2022    Vitamin D deficiency 09/02/2021    Chronic bilateral low back pain without sciatica 10/21/2011    Endometriosis 05/03/2010    Depression 09/01/1999      Prevention:  Health Maintenance Due   Topic Date Due    HIV screen  Never done    Hepatitis C screen  Never done    COVID-19 Vaccine (3 - Booster for [place] series) 07/01/2021    Flu vaccine (1) Never done      Meds prior:  Current Outpatient Medications   Medication Instructions    escitalopram (LEXAPRO) 10 mg, Oral, DAILY    escitalopram (LEXAPRO) 5 mg, Oral, DAILY    Handicap Placard MISC Does not apply, Patient cannot walk 200 ft without stopping to rest.  <BR>Expiration 6/13/2025      Physical Exam   Vitals: /72   Pulse 64   Temp

## 2023-10-10 ENCOUNTER — OFFICE VISIT (OUTPATIENT)
Dept: FAMILY MEDICINE CLINIC | Age: 48
End: 2023-10-10
Payer: COMMERCIAL

## 2023-10-10 VITALS
HEIGHT: 65 IN | BODY MASS INDEX: 25.79 KG/M2 | RESPIRATION RATE: 18 BRPM | SYSTOLIC BLOOD PRESSURE: 102 MMHG | HEART RATE: 66 BPM | WEIGHT: 154.8 LBS | TEMPERATURE: 98 F | DIASTOLIC BLOOD PRESSURE: 63 MMHG | OXYGEN SATURATION: 99 %

## 2023-10-10 DIAGNOSIS — F33.1 MODERATE EPISODE OF RECURRENT MAJOR DEPRESSIVE DISORDER (HCC): ICD-10-CM

## 2023-10-10 DIAGNOSIS — R26.2 DIFFICULTY WALKING: Primary | ICD-10-CM

## 2023-10-10 PROCEDURE — 99213 OFFICE O/P EST LOW 20 MIN: CPT | Performed by: STUDENT IN AN ORGANIZED HEALTH CARE EDUCATION/TRAINING PROGRAM

## 2023-10-10 RX ORDER — ESCITALOPRAM OXALATE 10 MG/1
10 TABLET ORAL DAILY
Qty: 90 TABLET | Refills: 1 | Status: SHIPPED | OUTPATIENT
Start: 2023-10-10 | End: 2024-04-07

## 2023-10-10 RX ORDER — ESCITALOPRAM OXALATE 5 MG/1
5 TABLET ORAL DAILY
Qty: 90 TABLET | Refills: 1 | Status: SHIPPED | OUTPATIENT
Start: 2023-10-10 | End: 2024-04-07

## 2023-11-09 ENCOUNTER — OFFICE VISIT (OUTPATIENT)
Dept: FAMILY MEDICINE CLINIC | Age: 48
End: 2023-11-09
Payer: COMMERCIAL

## 2023-11-09 VITALS
WEIGHT: 150.2 LBS | BODY MASS INDEX: 25.02 KG/M2 | HEART RATE: 71 BPM | DIASTOLIC BLOOD PRESSURE: 75 MMHG | HEIGHT: 65 IN | OXYGEN SATURATION: 97 % | TEMPERATURE: 96.9 F | RESPIRATION RATE: 18 BRPM | SYSTOLIC BLOOD PRESSURE: 117 MMHG

## 2023-11-09 DIAGNOSIS — R42 PERSISTENT POSTURAL-PERCEPTUAL DIZZINESS: Primary | ICD-10-CM

## 2023-11-09 PROBLEM — H81.8X9 PERSISTENT POSTURAL-PERCEPTUAL DIZZINESS: Status: ACTIVE | Noted: 2022-02-25

## 2023-11-09 PROCEDURE — 99212 OFFICE O/P EST SF 10 MIN: CPT | Performed by: STUDENT IN AN ORGANIZED HEALTH CARE EDUCATION/TRAINING PROGRAM

## 2023-11-09 NOTE — PROGRESS NOTES
Patient:  Alysa Stevens 57 y.o. female     11/09/23      Chiefcomplaint:   Chief Complaint   Patient presents with    1 Month Follow-Up     Problems and Overview   No change in overall sx. Trying to get in with neurology. Speaking with neuropsych. Maybe dysautonomia. No specialist locally  Agitated and frustrated with lack of progress    Patient Active Problem List    Diagnosis Date Noted    Difficulty walking 07/19/2022     Priority: Medium     Evaluated by ENT, Audiology with VNG (no abnormality noted), neurology ( migraine variant vs bppv), MRI CTA as noted below. CTA  Impression   1. No acute intracranial abnormality. 2. Unremarkable CTA of the head. MRI  Multifocal punctate foci of subtle increased T2/FLAIR signal within the   bifrontal cerebral white matter. Differential diagnostic considerations   include association with minimal microvascular ischemic disease, migraine   headaches, posttraumatic sequela, vasculitis/inflammatory disease.            Family history of Parkinson disease 07/19/2022     Priority: Medium    Functional movement disorder 08/01/2023    Moderate episode of recurrent major depressive disorder ([address]) 06/27/2023    Migraine variant with headache 05/18/2022     Childhood onset      Low BP 03/24/2022     Persistent low normal bp      Persistent postural-perceptual dizziness 02/25/2022    Vitamin D deficiency 09/02/2021    Chronic bilateral low back pain without sciatica 10/21/2011    Endometriosis 05/03/2010    Depression 09/01/1999      Prevention:  Health Maintenance Due   Topic Date Due    Hepatitis B vaccine (1 of 3 - 3-dose series) Never done    HIV screen  Never done    Hepatitis C screen  Never done    Flu vaccine (1) Never done    COVID-19 Vaccine (3 - 2023-24 season) 09/01/2023      Meds prior:  Current Outpatient Medications   Medication Instructions    escitalopram (LEXAPRO) 5 mg, Oral, DAILY, Alternate 10 and 15mg daily    escitalopram (LEXAPRO) 10 mg, Oral,

## 2023-11-16 ENCOUNTER — CARE COORDINATION (OUTPATIENT)
Dept: OTHER | Facility: CLINIC | Age: 48
End: 2023-11-16

## 2023-11-16 RX ORDER — ACETAMINOPHEN 500 MG
500-1000 TABLET ORAL EVERY 6 HOURS PRN
COMMUNITY

## 2023-11-16 NOTE — CARE COORDINATION
Ambulatory Care Coordination Note  2023    Patient Current Location:  Home: [place] [address]     ACM contacted the patient by telephone. Verified name and  with patient as identifiers. Provided introduction to self, and explanation of the ACM role. Challenges to be reviewed by the provider   Additional needs identified to be addressed with provider: No  none               Method of communication with provider: none. ACM: Mayo Duenas RN    Ambulatory Care Manager [facility]) contacted the patient to introduce the Associate Care Management Program related to neurological issues and care coordination needs. ACM reviewed and updated medication  and disease specific assessment patient was agreeable to follow up Care Management calls. Summary Note: Patient outreached this ACM requesting assistance with establishing with [address] [place] [address] Neurology in any market. Returned call to this patient. Patient reports the following symptoms and also sent this ACM a list of symptoms as well as timeline via [employer]:  Difficulty walking - Patient states feels like legs are going in the direction head is spinning. Dizziness  Cold all the time  Light sensitivity  Constant headache  Auditory sensitivity  Hypersensitivity to smells  Feel like floating  Nausea  Difficulty concentrating  Word searching  Difficulty grasping instructions  Excessive burping if move too much    Patient denies any falls at this time due to \"touching every dirty surface\" to stay upright. Patient denies incontinence. Patient denies symptoms to upper extremities. Patient reports coping with symptoms. Patient reports intermittent agitation. Discussed outreaching mental health provider or utilizing [employer], available for physician and mental health    Patient has followed up with Opthalmology, ENT, Vestibular Rehab, Cervical Rehab, Neuro, Audiology, EP, Psychology and had imaging.     Patient has tried the following

## 2023-11-30 ENCOUNTER — CARE COORDINATION (OUTPATIENT)
Dept: OTHER | Facility: CLINIC | Age: 48
End: 2023-11-30

## 2023-11-30 NOTE — CARE COORDINATION
Ambulatory Care Coordination Note  2023    Patient Current Location: [place]     ACM contacted the patient by telephone. Verified name and  with patient as identifiers. Challenges to be reviewed by the provider   Additional needs identified to be addressed with provider: No  none               Method of communication with provider: none. ACM: Jacqui Bello RN    ACM contacted the patient to follow up on progress, discuss new issues or concerns, and reinforce/provide patient education. Summary Note: Patient denies any changes to physical symptoms. Patient reports headache a couple times last week and then four days straight over weekend. Patient denies any falls. Patient reports \"can't control which direction going\" comparing symptoms to a spinning top. Patient reports position doesn't change symptoms; however, moving makes symptoms worse as well as seeing things move in visual field. Patient reports trying to find a way to adapt to physical and cognitive symptoms. Patient asking about ambulatory eeg. This ACM sent link to patient via [employer] for [employer]. Patient notified would need referral from PCP. Patient denies any further needs, questions, or concerns at this time other than those being addressed. Patient is  agreeable to future follow up calls. Reinforced/Provided Education:    Importance and benefits of: Follow up with PCP and specialist, medication adherence, self monitoring and reporting of symptoms. Plan:  Plan for follow-up call in 7-10 days based on severity of symptoms and risk factors. Plan for next call: symptom management-symptoms, mental health  follow-up appointment-Any appts scheduled  medication management-Med changes      Obtained referral to neuro. Patient  verbalized understanding and is agreeable to follow up call.            Future Appointments   Date Time Provider [address] [place] [address]   2024  8:40 AM Rip Meza MD

## 2023-12-07 ENCOUNTER — CARE COORDINATION (OUTPATIENT)
Dept: OTHER | Facility: CLINIC | Age: 48
End: 2023-12-07

## 2023-12-07 NOTE — CARE COORDINATION
Ambulatory Care Coordination Note    ACM attempted to reach patient for care management follow up call regarding referral to Neuro and ambulatory EE. HIPAA compliant message left requesting a return phone call at patient convenience. Plan for follow up call in 14-21 days.       Future Appointments   Date Time Provider [facility] [address]   2/8/2024  8:40 AM MD JETT Mcgill

## 2024-01-03 DIAGNOSIS — R42 PERSISTENT POSTURAL-PERCEPTUAL DIZZINESS: Primary | ICD-10-CM

## 2024-01-11 ENCOUNTER — CARE COORDINATION (OUTPATIENT)
Dept: OTHER | Facility: CLINIC | Age: 49
End: 2024-01-11

## 2024-01-11 NOTE — CARE COORDINATION
Ambulatory Care Coordination Note    ACM attempted to reach patient for care management follow up call regarding symptoms and neurologists. HIPAA compliant message left requesting a return phone call at patient convenience.     Information sent to patient via [employer]:    Local Neurology in Network     Plan for follow up call in one month.     Future Appointments   Date Time Provider Department Center   2/8/2024  8:40 AM Hernesto Hicks MD [facility]   4/22/2024  2:40 PM Odalys Pham MD [facility] NEURO Neurology -

## 2024-01-12 ENCOUNTER — CARE COORDINATION (OUTPATIENT)
Dept: OTHER | Facility: CLINIC | Age: 49
End: 2024-01-12

## 2024-01-12 NOTE — CARE COORDINATION
Per Your [employer] message from patient, has appt with Neurology in March.  Patient denies any further needs at this time and will outreach this ACM if future needs arise.  This ACM to sign off and close program.

## 2024-01-23 ENCOUNTER — TELEMEDICINE (OUTPATIENT)
Dept: FAMILY MEDICINE CLINIC | Age: 49
End: 2024-01-23
Payer: COMMERCIAL

## 2024-01-23 DIAGNOSIS — F33.1 MODERATE EPISODE OF RECURRENT MAJOR DEPRESSIVE DISORDER (HCC): ICD-10-CM

## 2024-01-23 DIAGNOSIS — R26.2 DIFFICULTY WALKING: ICD-10-CM

## 2024-01-23 DIAGNOSIS — G25.9 FUNCTIONAL MOVEMENT DISORDER: Primary | ICD-10-CM

## 2024-01-23 DIAGNOSIS — R29.3 POSTURAL INSTABILITY: ICD-10-CM

## 2024-01-23 PROCEDURE — 99213 OFFICE O/P EST LOW 20 MIN: CPT | Performed by: STUDENT IN AN ORGANIZED HEALTH CARE EDUCATION/TRAINING PROGRAM

## 2024-01-23 NOTE — PROGRESS NOTES
Patient:  Violeta Stevens 48 y.o. female     01/23/24      Chiefcomplaint:   Chief Complaint   Patient presents with    Neurologic Problem     History of Present Illness   Pt was evaluated through a synchronous (real-time) audio-video encounter. The patient (or guardian if applicable) is aware that this is a billable service, which includes applicable co-pays. This Virtual Visit was conducted with patient's (and/or legal guardian's) consent. Patient identification was verified, and a caregiver was present when appropriate.   [facility] [address] [facility] [address] [facility] [address]  Provider was located at Home (Appt Dept State): [place]    Wants to take wheelchair on flight to conference  Letter of medical necessity  Has appointment for neuro in April.   Wondering about 72 hours eeg  She has abnormal motor activity of the lower extremities. Questino of lumbar spine involvement or peripheral neuropathy. Does have chronic lumbar pain, but without typical radiculopathy    Health Maintenance Due   Topic Date Due    Hepatitis B vaccine (1 of 3 - 3-dose series) Never done    HIV screen  Never done    Hepatitis C screen  Never done    Flu vaccine (1) Never done    COVID-19 Vaccine (3 - 2023-24 season) 09/01/2023    Depression Monitoring  01/10/2024      History:  Prior to Visit Medications    Medication Sig Taking? Authorizing Provider   aspirin-acetaminophen-caffeine (EXCEDRIN MIGRAINE) 250-250-65 MG per tablet Take 1-2 tablets by mouth every 6 hours as needed for Headaches  Provider, MD Zuri   acetaminophen (TYLENOL) 500 MG tablet Take 1-2 tablets by mouth every 6 hours as needed for Pain  ProviderZuri MD   escitalopram (LEXAPRO) 5 MG tablet Take 1 tablet by mouth daily Alternate 10 and 15mg daily  Rolf Ruiz DO   escitalopram (LEXAPRO) 10 MG tablet Take 1 tablet by mouth daily Alternate total daily dose of 10 and 15mg  Rolf Ruiz DO   Handicap Placard MISC by Does not apply

## 2024-02-08 ENCOUNTER — HOSPITAL ENCOUNTER (OUTPATIENT)
Dept: NEUROLOGY | Age: 49
Discharge: HOME | End: 2024-02-08
Payer: COMMERCIAL

## 2024-02-08 VITALS — WEIGHT: 151 LBS | HEIGHT: 65 IN | BODY MASS INDEX: 25.16 KG/M2

## 2024-02-08 DIAGNOSIS — G25.9 FUNCTIONAL MOVEMENT DISORDER: ICD-10-CM

## 2024-02-08 DIAGNOSIS — R29.3 POSTURAL INSTABILITY: ICD-10-CM

## 2024-02-08 DIAGNOSIS — R26.2 DIFFICULTY WALKING: ICD-10-CM

## 2024-02-08 PROCEDURE — 95911 NRV CNDJ TEST 9-10 STUDIES: CPT

## 2024-02-08 PROCEDURE — 95886 MUSC TEST DONE W/N TEST COMP: CPT

## 2024-02-08 NOTE — PROCEDURES
[facility] [place]  Electrodiagnostic Laboratory  [place]        Full Name: Violeta Stevens Gender: Female  MRN: [id number] YOB: 1975  Location: Outpt.      Visit Date: 2/8/2024 14:29  Age: 48 Years 4 Months Old  Examining Physician: Dr. Crowell  Referring Physician: Dr. Ruiz  Technician: Patience Albarado   Height: 5 feet 5 inch  Weight: 151 lbs  BMI: 25.2  Notes: Functional movement disorder; Difficulty walking; Postural instability        Motor NCS      Nerve / Sites Lat. Amplitude Amp.1-2 Distance Lat Diff Velocity Temp.    ms mV % cm ms m/s °C   L Peroneal - EDB      Ankle 3.85 5.7 100 837   31.6      Pop fossa 12.29 5.2 91.9 37 8.44 44 31.6   R Peroneal - EDB      Ankle 4.74 3.1 100 8   32      Pop fossa 13.23 2.5 80.9 37 8.49 44 32   R Tibial - AH      Ankle 5.16 13.9 100 8   32.1      Pop fossa 14.06 9.6 69.3 40 8.91 45 32.4   L Tibial - AH      Ankle 4.95 14.9 100 8   32.9      Pop fossa 14.43 8.3 55.9 41 9.48 43 32.9       Sensory NCS      Nerve / Sites Onset Lat Peak Lat PP Amp Distance Velocity Temp.    ms ms µV cm m/s °C   L Superficial peroneal - Ankle      Lat leg 2.45 3.02 12.1 10 41 31.9   R Superficial peroneal - Ankle      Lat leg 2.03 2.76 9.6 10 49 32.1   R Sural - Ankle (Calf)      Calf 2.97 3.85 16.7 14 47 32.9   L Sural - Ankle (Calf)      Calf 3.23 4.11 14.7 14 43 32.9       F  Wave      Nerve F Lat M Lat F-M Lat    ms ms ms   L Peroneal - EDB 53.1 4.2 48.9   R Peroneal - EDB 54.9 4.9 50.1   R Tibial - AH 60.5 5.5 55.0   L Tibial - AH 60.2 6.0 54.2       H Reflex      Nerve Lat Hmax    ms   R Tibial - Soleus 32.1   L Tibial - Soleus 32.7         EMG         EMG Summary Table     Spontaneous MUAP Recruitment   Muscle IA Fib PSW Fasc H.F. Amp Dur. PPP Pattern   L. Tibialis anterior N None None None None N N N N   L. Gastrocnemius (Medial head) N None None None None N N N N   L. Flexor digitorum longus N None None None None N N N N   L. Extensor

## 2024-04-09 DIAGNOSIS — F33.1 MODERATE EPISODE OF RECURRENT MAJOR DEPRESSIVE DISORDER (HCC): ICD-10-CM

## 2024-04-09 RX ORDER — ESCITALOPRAM OXALATE 10 MG/1
10 TABLET ORAL DAILY
Qty: 90 TABLET | Refills: 1 | Status: SHIPPED | OUTPATIENT
Start: 2024-04-09 | End: 2024-10-06

## 2024-04-09 NOTE — TELEPHONE ENCOUNTER
Last Appointment:  1/23/2024  Future Appointments   Date Time Provider Department Center   4/22/2024  2:40 PM Odalys Pham MD [facility] NEURO Neurology -   2/17/2025  3:00 PM Julian Hicks MD [facility] JULIAN GARCIAL

## 2024-04-22 ENCOUNTER — OFFICE VISIT (OUTPATIENT)
Age: 49
End: 2024-04-22
Payer: COMMERCIAL

## 2024-04-22 VITALS
DIASTOLIC BLOOD PRESSURE: 77 MMHG | WEIGHT: 155.9 LBS | BODY MASS INDEX: 25.97 KG/M2 | SYSTOLIC BLOOD PRESSURE: 120 MMHG | HEART RATE: 73 BPM | HEIGHT: 65 IN | TEMPERATURE: 98.4 F

## 2024-04-22 DIAGNOSIS — G43.711 INTRACTABLE CHRONIC MIGRAINE WITHOUT AURA AND WITH STATUS MIGRAINOSUS: ICD-10-CM

## 2024-04-22 DIAGNOSIS — G44.40 REBOUND HEADACHE: ICD-10-CM

## 2024-04-22 DIAGNOSIS — R42 CHRONIC VERTIGO: Primary | ICD-10-CM

## 2024-04-22 DIAGNOSIS — G43.809 VESTIBULAR MIGRAINE: ICD-10-CM

## 2024-04-22 PROCEDURE — 99204 OFFICE O/P NEW MOD 45 MIN: CPT | Performed by: PSYCHIATRY & NEUROLOGY

## 2024-04-22 RX ORDER — RIZATRIPTAN BENZOATE 10 MG/1
10 TABLET ORAL PRN
Qty: 9 TABLET | Refills: 3 | Status: SHIPPED | OUTPATIENT
Start: 2024-04-22

## 2024-04-22 RX ORDER — DIVALPROEX SODIUM 125 MG/1
125 TABLET, DELAYED RELEASE ORAL 2 TIMES DAILY
Qty: 60 TABLET | Refills: 3 | Status: SHIPPED | OUTPATIENT
Start: 2024-04-22

## 2024-04-22 RX ORDER — METHYLPREDNISOLONE 4 MG/1
TABLET ORAL
Qty: 22 TABLET | Refills: 2 | Status: SHIPPED | OUTPATIENT
Start: 2024-04-22 | End: 2024-04-28

## 2024-04-22 NOTE — PROGRESS NOTES
MD Mark Veraie Ann Hilda is a 48 y.o. 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  Tried standing up and turned  Started to have vertigo    Progression: constant and daily  Stable since onset  Fluctuates based on intensity, based on whether she can function with it    Frequency: daily  Constant  Through the day  Feels like head is floating, spinning, not level  Fluctuates in intensity  No vomiting  Feels like she burps a lot    Feels off balance  Needs to grab on to surfaces to keep balance  No falls.   Needed wheelchair at times    Motion sickness with seeing others walk, hands move    I have personally reviewed the laboratory, cardiac diagnostic and radiographic testing as outlined above:  Records from recent hospitalization reviewed and summarized as above        Tried ent, audiology appt  Seen multiple neurologists        Mri brain, iac:  IMPRESSION:  1.  Unremarkable MRI of the IACs.  No evidence vestibular schwannoma.  2.  No acute intracranial abnormality.  3. Few stable nonspecific T2 hyperintense foci in the frontal lobes.  They  may represent sequela of chronic microvascular ischemic changes, post  infectious/inflammatory insults and/or the result of migraine headaches.        Mra brain/neck:  IMPRESSION:  1. 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  2. Otherwise, unremarkable MRA of the brain and neck.    Vng:   Jose vestibular functions jose  Nml ocuolomotor and positional test results      Treatment: tried vestibular rehab- worsened  Tried neurontin- gi side effects  Tried cymbalta- insomnia  Tried weaning off lexapro- worsened mood  Neck traction- no help    Echo: 2022: nml    Tilt table test:  The patient was brought to the  electrophysiology area in the

## 2024-04-22 NOTE — PATIENT INSTRUCTIONS
Mag 500 mg a day      Vit b2- riboflavin 100 mg tablets- 200 mg twice a day    Melatonin    Coq10       Odalys Pham MD

## 2024-05-14 ENCOUNTER — HOSPITAL ENCOUNTER (OUTPATIENT)
Dept: GENERAL RADIOLOGY | Age: 49
Discharge: HOME | End: 2024-05-16
Payer: COMMERCIAL

## 2024-05-14 VITALS — WEIGHT: 160 LBS | HEIGHT: 65 IN | BODY MASS INDEX: 26.66 KG/M2

## 2024-05-14 DIAGNOSIS — Z12.31 SCREENING MAMMOGRAM, ENCOUNTER FOR: ICD-10-CM

## 2024-05-14 PROCEDURE — 77063 BREAST TOMOSYNTHESIS BI: CPT

## 2024-05-20 NOTE — TELEPHONE ENCOUNTER
Requested Prescriptions     Pending Prescriptions Disp Refills    divalproex (DEPAKOTE) 125 MG DR tablet 60 tablet 3     Sig: Take 1 tablet by mouth in the morning and at bedtime

## 2024-05-21 RX ORDER — DIVALPROEX SODIUM 125 MG/1
125 TABLET, DELAYED RELEASE ORAL 2 TIMES DAILY
Qty: 60 TABLET | Refills: 3 | Status: SHIPPED | OUTPATIENT
Start: 2024-05-21

## 2024-06-25 ENCOUNTER — HOSPITAL ENCOUNTER (OUTPATIENT)
Age: 49
Discharge: HOME | End: 2024-06-25
Payer: COMMERCIAL

## 2024-06-25 DIAGNOSIS — R42 CHRONIC VERTIGO: ICD-10-CM

## 2024-06-25 DIAGNOSIS — G43.809 VESTIBULAR MIGRAINE: ICD-10-CM

## 2024-06-25 DIAGNOSIS — G43.711 INTRACTABLE CHRONIC MIGRAINE WITHOUT AURA AND WITH STATUS MIGRAINOSUS: ICD-10-CM

## 2024-06-25 LAB
ALBUMIN SERPL-MCNC: 4 G/DL (ref 3.5–5.2)
ALP SERPL-CCNC: 55 U/L (ref 35–104)
ALT SERPL-CCNC: 11 U/L (ref 0–32)
ANION GAP SERPL CALCULATED.3IONS-SCNC: 8 MMOL/L (ref 7–16)
AST SERPL-CCNC: 14 U/L (ref 0–31)
BASOPHILS # BLD: 0.04 K/UL (ref 0–0.2)
BASOPHILS NFR BLD: 1 % (ref 0–2)
BILIRUB SERPL-MCNC: 0.4 MG/DL (ref 0–1.2)
BUN SERPL-MCNC: 8 MG/DL (ref 6–20)
CALCIUM SERPL-MCNC: 9.4 MG/DL (ref 8.6–10.2)
CHLORIDE SERPL-SCNC: 105 MMOL/L (ref 98–107)
CO2 SERPL-SCNC: 28 MMOL/L (ref 22–29)
CREAT SERPL-MCNC: 0.8 MG/DL (ref 0.5–1)
EOSINOPHIL # BLD: 0.3 K/UL (ref 0.05–0.5)
EOSINOPHILS RELATIVE PERCENT: 6 % (ref 0–6)
ERYTHROCYTE [DISTWIDTH] IN BLOOD BY AUTOMATED COUNT: 11.8 % (ref 11.5–15)
GFR, ESTIMATED: 88 ML/MIN/1.73M2
GLUCOSE SERPL-MCNC: 85 MG/DL (ref 74–99)
HCT VFR BLD AUTO: 38.1 % (ref 34–48)
HGB BLD-MCNC: 13.6 G/DL (ref 11.5–15.5)
IMM GRANULOCYTES # BLD AUTO: <0.03 K/UL (ref 0–0.58)
IMM GRANULOCYTES NFR BLD: 0 % (ref 0–5)
LYMPHOCYTES NFR BLD: 1.79 K/UL (ref 1.5–4)
LYMPHOCYTES RELATIVE PERCENT: 33 % (ref 20–42)
MCH RBC QN AUTO: 32.7 PG (ref 26–35)
MCHC RBC AUTO-ENTMCNC: 35.7 G/DL (ref 32–34.5)
MCV RBC AUTO: 91.6 FL (ref 80–99.9)
MONOCYTES NFR BLD: 0.48 K/UL (ref 0.1–0.95)
MONOCYTES NFR BLD: 9 % (ref 2–12)
NEUTROPHILS NFR BLD: 51 % (ref 43–80)
NEUTS SEG NFR BLD: 2.77 K/UL (ref 1.8–7.3)
PLATELET # BLD AUTO: 280 K/UL (ref 130–450)
PMV BLD AUTO: 9.8 FL (ref 7–12)
POTASSIUM SERPL-SCNC: 4.6 MMOL/L (ref 3.5–5)
PROT SERPL-MCNC: 5.9 G/DL (ref 6.4–8.3)
RBC # BLD AUTO: 4.16 M/UL (ref 3.5–5.5)
SODIUM SERPL-SCNC: 141 MMOL/L (ref 132–146)
WBC OTHER # BLD: 5.4 K/UL (ref 4.5–11.5)

## 2024-06-25 PROCEDURE — 80053 COMPREHEN METABOLIC PANEL: CPT

## 2024-06-25 PROCEDURE — 85025 COMPLETE CBC W/AUTO DIFF WBC: CPT

## 2024-06-25 PROCEDURE — 36415 COLL VENOUS BLD VENIPUNCTURE: CPT

## 2024-06-26 ENCOUNTER — TELEPHONE (OUTPATIENT)
Age: 49
End: 2024-06-26

## 2024-06-26 ENCOUNTER — TELEMEDICINE (OUTPATIENT)
Age: 49
End: 2024-06-26
Payer: COMMERCIAL

## 2024-06-26 ENCOUNTER — ENROLLMENT (OUTPATIENT)
Dept: INTERNAL MEDICINE | Age: 49
End: 2024-06-26

## 2024-06-26 DIAGNOSIS — R42 CHRONIC VERTIGO: ICD-10-CM

## 2024-06-26 DIAGNOSIS — G43.711 INTRACTABLE CHRONIC MIGRAINE WITHOUT AURA AND WITH STATUS MIGRAINOSUS: Primary | ICD-10-CM

## 2024-06-26 DIAGNOSIS — T88.7XXA MEDICATION SIDE EFFECT: ICD-10-CM

## 2024-06-26 PROCEDURE — 99214 OFFICE O/P EST MOD 30 MIN: CPT | Performed by: PSYCHIATRY & NEUROLOGY

## 2024-06-26 RX ORDER — DIVALPROEX SODIUM 125 MG/1
125 TABLET, DELAYED RELEASE ORAL 2 TIMES DAILY
Qty: 60 TABLET | Refills: 3 | Status: SHIPPED | OUTPATIENT
Start: 2024-06-26

## 2024-06-26 RX ORDER — ERENUMAB-AOOE 70 MG/ML
70 INJECTION SUBCUTANEOUS
Qty: 1 ML | Refills: 3 | Status: SHIPPED | OUTPATIENT
Start: 2024-06-26

## 2024-06-26 RX ORDER — PROPRANOLOL HYDROCHLORIDE 10 MG/1
10 TABLET ORAL 2 TIMES DAILY
Qty: 60 TABLET | Refills: 3 | Status: SHIPPED | OUTPATIENT
Start: 2024-06-26

## 2024-06-26 NOTE — PROGRESS NOTES
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  Tried standing up and turned  Started to have vertigo     Progression: started on depakote 125 mg bid    Dizziness is still constant and daily  Stable since onset  Fluctuates based on intensity, based on whether she can function with it     Frequency: daily  Constant  Through the day  Feels like head is floating, spinning, not level  Fluctuates in intensity  No vomiting  Feels like she burps a lot     Feels off balance  Needs to grab on to surfaces to keep balance  No falls.   Needed wheelchair at times     Motion sickness with seeing others walk, hands move     I have personally reviewed the laboratory, cardiac diagnostic and radiographic testing as outlined above:  Records from recent hospitalization reviewed and summarized as above           Tried ent, audiology appt  Seen multiple neurologists           Mri brain, iac:  IMPRESSION:  1.  Unremarkable MRI of the IACs.  No evidence vestibular schwannoma.  2.  No acute intracranial abnormality.  3. Few stable nonspecific T2 hyperintense foci in the frontal lobes.  They  may represent sequela of chronic microvascular ischemic changes, post  infectious/inflammatory insults and/or the result of migraine headaches.           Mra brain/neck:  IMPRESSION:  1. 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  2. Otherwise, unremarkable MRA of the brain and neck.     Vng:   Jose vestibular functions jose  Nml ocuolomotor and positional test results        Treatment: tried vestibular rehab- worsened  Tried neurontin- gi side effects  Tried cymbalta- insomnia  Tried weaning off lexapro- worsened mood  Neck traction- no help     Echo: 2022: nml     Tilt table test:  The patient

## 2024-07-02 ENCOUNTER — TELEPHONE (OUTPATIENT)
Dept: INTERNAL MEDICINE | Age: 49
End: 2024-07-02

## 2024-07-03 RX ORDER — ESCITALOPRAM OXALATE 5 MG/1
5 TABLET ORAL EVERY OTHER DAY
COMMUNITY
Start: 2024-05-30

## 2024-07-03 NOTE — PROGRESS NOTES
Specialty Medication Service    Patient's Name: Violeta Stevens YOB: 1975      Violeta Stevens is a 48 y.o. female presenting today for Specialty Medication Service visit. Patient is prescribed SMS formulary medication, Aimovig. Medication list updated.    Specialty Medication: Aimovig 70mg/mL Autoinjector   Frequency: Once monthly  Indication: Migraines  Initially Diagnosed: 1/2022  Additional Therapy:   Vitamin B2  Magnesium  COQ-10  Divalproex  Rizatriptan  Previous Therapy:   Cymbalta  Neurontin  Tylenol  Excedrin  Meclizine  Dramamine  Vestibular rehab    Specialist:   Odalys Pham  [address] [place] [address]  [phone]  Specialist Progress Note Available: Yes, EPIC  Last Specialist Visit: 6/26/2024   Headaches: On depakote, Slightly better. 3-5/week. Irving top of head.  Milder headaches- 2-3/10. Severe days- 2/month, improved. Pressure like pain. Has photophobia, phonophobia. Has osmophobia. Has burping constant. Brain fog. No visual auras. No sensory auras. Start Aimovig and Inderal.    Allergies   Allergen Reactions    Compazine [Prochlorperazine Maleate] Other (See Comments)     Patient states \"I go catatonic\"    Prochlorperazine      Other reaction(s): catatonic    Prochlorperazine Edisylate      Other reaction(s): catatonic    Prochlorperazine Maleate      Other reaction(s): catatonic    Codeine Nausea And Vomiting        Past Medical History:   Diagnosis Date    Cephalgia 05/18/2022    Chronic back pain 1999    Depression     Endometriosis     Hyperacusis of left ear 05/18/2022    Reported by pt.    Low blood pressure 05/18/2022    History of low BP    Migraine     Migraine variant with headache 05/18/2022    Childhood onset    Peripheral vestibulopathy 05/18/2022    Persistent postural-perceptual dizziness 05/18/2022    PONV (postoperative nausea and vomiting)       Social History     Tobacco Use    Smoking status: Never    Smokeless tobacco:

## 2024-07-09 ENCOUNTER — PHARMACY VISIT (OUTPATIENT)
Dept: INTERNAL MEDICINE | Age: 49
End: 2024-07-09

## 2024-07-09 DIAGNOSIS — G43.809 MIGRAINE VARIANT WITH HEADACHE: Primary | Chronic | ICD-10-CM

## 2024-07-09 RX ORDER — VITAMIN B COMPLEX
100 TABLET ORAL DAILY
COMMUNITY

## 2024-07-09 RX ORDER — RIBOFLAVIN (VITAMIN B2) 100 MG
100 TABLET ORAL DAILY
COMMUNITY

## 2024-07-09 RX ORDER — CALCIUM CARBONATE 300MG(750)
400 TABLET,CHEWABLE ORAL DAILY
COMMUNITY

## 2024-07-09 ASSESSMENT — PROMIS GLOBAL HEALTH SCALE
IN GENERAL, PLEASE RATE HOW WELL YOU CARRY OUT YOUR USUAL SOCIAL ACTIVITIES (INCLUDES ACTIVITIES AT HOME, AT WORK, AND IN YOUR COMMUNITY, AND RESPONSIBILITIES AS A PARENT, CHILD, SPOUSE, EMPLOYEE, FRIEND, ETC) [ON A SCALE OF 1 (POOR) TO 5 (EXCELLENT)]?: FAIR
IN GENERAL, HOW WOULD YOU RATE YOUR PHYSICAL HEALTH [ON A SCALE OF 1 (POOR) TO 5 (EXCELLENT)]?: FAIR
IN GENERAL, HOW WOULD YOU RATE YOUR SATISFACTION WITH YOUR SOCIAL ACTIVITIES AND RELATIONSHIPS [ON A SCALE OF 1 (POOR) TO 5 (EXCELLENT)]?: FAIR
IN GENERAL, WOULD YOU SAY YOUR QUALITY OF LIFE IS...[ON A SCALE OF 1 (POOR) TO 5 (EXCELLENT)]: FAIR
IN GENERAL, HOW WOULD YOU RATE YOUR MENTAL HEALTH, INCLUDING YOUR MOOD AND YOUR ABILITY TO THINK [ON A SCALE OF 1 (POOR) TO 5 (EXCELLENT)]?: POOR
SUM OF RESPONSES TO QUESTIONS 2, 4, 5, & 10: 8
IN THE PAST 7 DAYS, HOW WOULD YOU RATE YOUR PAIN ON AVERAGE [ON A SCALE FROM 0 (NO PAIN) TO 10 (WORST IMAGINABLE PAIN)]?: 1
IN THE PAST 7 DAYS, HOW OFTEN HAVE YOU BEEN BOTHERED BY EMOTIONAL PROBLEMS, SUCH AS FEELING ANXIOUS, DEPRESSED, OR IRRITABLE [ON A SCALE FROM 1 (NEVER) TO 5 (ALWAYS)]?: SOMETIMES
SUM OF RESPONSES TO QUESTIONS 3, 6, 7, & 8: 9
IN THE PAST 7 DAYS, HOW WOULD YOU RATE YOUR FATIGUE ON AVERAGE [ON A SCALE FROM 1 (NONE) TO 5 (VERY SEVERE)]?: MODERATE
IN GENERAL, WOULD YOU SAY YOUR HEALTH IS...[ON A SCALE OF 1 (POOR) TO 5 (EXCELLENT)]: FAIR
TO WHAT EXTENT ARE YOU ABLE TO CARRY OUT YOUR EVERYDAY PHYSICAL ACTIVITIES SUCH AS WALKING, CLIMBING STAIRS, CARRYING GROCERIES, OR MOVING A CHAIR [ON A SCALE OF 1 (NOT AT ALL) TO 5 (COMPLETELY)]?: MODERATELY

## 2024-07-30 DIAGNOSIS — F33.1 MODERATE EPISODE OF RECURRENT MAJOR DEPRESSIVE DISORDER (HCC): ICD-10-CM

## 2024-07-30 RX ORDER — ESCITALOPRAM OXALATE 10 MG/1
TABLET ORAL
Qty: 90 TABLET | Refills: 1 | OUTPATIENT
Start: 2024-07-30

## 2024-07-31 ENCOUNTER — TELEPHONE (OUTPATIENT)
Dept: INTERNAL MEDICINE | Age: 49
End: 2024-07-31

## 2024-07-31 NOTE — TELEPHONE ENCOUNTER
Specialty Medication Service    Date: 7/31/2024  Patient's Name: Violeta Stevens YOB: 1975            _____________________________________________________________________________________________    Email received from [facility] in regard to current SMS formulary medication, Aimovig.  SMS override placed. Override placed through 8/15/24 when pt has sms md appt.    Holly Soriano CPhT  Pharmacy   Specialty Medication Services   Phone: [phone] option 4    For Pharmacy Admin Tracking Only    Program: SMS  CPA in place:  Yes  Recommendation Provided To: Pharmacy: 1  Intervention Detail: Benefit Assistance  Intervention Accepted By: Pharmacy: 1  Gap Closed?:    Time Spent (min): 15

## 2024-08-01 RX ORDER — PROPRANOLOL HYDROCHLORIDE 10 MG/1
10 TABLET ORAL 2 TIMES DAILY
COMMUNITY
Start: 2024-07-30

## 2024-08-15 ENCOUNTER — PHARMACY VISIT (OUTPATIENT)
Dept: INTERNAL MEDICINE | Age: 49
End: 2024-08-15

## 2024-08-15 DIAGNOSIS — G43.809 MIGRAINE VARIANT WITH HEADACHE: Primary | ICD-10-CM

## 2024-08-15 PROCEDURE — 1111F DSCHRG MED/CURRENT MED MERGE: CPT | Performed by: INTERNAL MEDICINE

## 2024-08-15 RX ORDER — ERENUMAB-AOOE 70 MG/ML
70 INJECTION SUBCUTANEOUS
Qty: 1 ML | Refills: 3 | Status: SHIPPED | OUTPATIENT
Start: 2024-08-15

## 2024-08-15 NOTE — PROGRESS NOTES
Initial Specialty Medication Virtual Visit  [facility]  [facility]  [address] [place] [address]  Dept: [phone]  Dept Fax: [phone]  Date of patient's visit: 8/15/2024  Patient's Name:  Violeta Stevens YOB: 1975            Patient Care Team:  Rolf Ruiz DO as PCP - General (Family Medicine)  Rolf Ruiz DO as PCP - Empaneled Provider  Linda Bailey MD as Consulting Physician (Electrophysiology)  ================================================================    REASON FOR VISIT/CHIEF COMPLAINT:  Medication Management    HISTORY OF PRESENTING ILLNESS:  Violeta Stevens is 48 y.o. is here for initial virtual visit for specialty medication.    Specialty Medication: Aimovog 70 mg  Frequency: Every 30 days  Indication: chronic migraines  Initially Diagnosed:   Specialist:   Last Specialist Visit: 2024  Side effects includes: constipation   Current symptoms include: headaches, dizziness  Violeta Stevens has no new complain today.   Recent blood work done on N/A .        DIAGNOSTIC FINDINGS:  CBC:  Lab Results   Component Value Date/Time    WBC 5.4 06/25/2024 08:25 AM    HGB 13.6 06/25/2024 08:25 AM     06/25/2024 08:25 AM       BMP:    Lab Results   Component Value Date/Time     06/25/2024 08:25 AM    K 4.6 06/25/2024 08:25 AM    K 4.0 01/24/2022 05:59 AM     06/25/2024 08:25 AM    CO2 28 06/25/2024 08:25 AM    BUN 8 06/25/2024 08:25 AM    CREATININE 0.8 06/25/2024 08:25 AM    GLUCOSE 85 06/25/2024 08:25 AM       HEMOGLOBIN A1C:   Lab Results   Component Value Date/Time    LABA1C 4.7 12/08/2022 10:00 AM       FASTING LIPID PANEL:  Lab Results   Component Value Date    CHOL 184 09/24/2022    HDL 62 09/24/2022    TRIG 75 09/24/2022       Lab Results   Component Value Date    CAITLIN NEGATIVE 09/24/2022       No results found for: \"HAV\", \"HEPAIGM\", \"HEPBIGM\", \"HEPBCAB\", \"HBEAG\",

## 2024-09-27 ENCOUNTER — OFFICE VISIT (OUTPATIENT)
Age: 49
End: 2024-09-27
Payer: COMMERCIAL

## 2024-09-27 VITALS
WEIGHT: 155 LBS | HEIGHT: 65 IN | SYSTOLIC BLOOD PRESSURE: 102 MMHG | HEART RATE: 73 BPM | OXYGEN SATURATION: 97 % | BODY MASS INDEX: 25.83 KG/M2 | DIASTOLIC BLOOD PRESSURE: 65 MMHG

## 2024-09-27 DIAGNOSIS — T88.7XXA MEDICATION SIDE EFFECT: ICD-10-CM

## 2024-09-27 DIAGNOSIS — G43.711 INTRACTABLE CHRONIC MIGRAINE WITHOUT AURA AND WITH STATUS MIGRAINOSUS: Primary | ICD-10-CM

## 2024-09-27 DIAGNOSIS — R42 CHRONIC VERTIGO: ICD-10-CM

## 2024-09-27 PROCEDURE — 99214 OFFICE O/P EST MOD 30 MIN: CPT | Performed by: PSYCHIATRY & NEUROLOGY

## 2024-09-27 RX ORDER — SCOLOPAMINE TRANSDERMAL SYSTEM 1 MG/1
1 PATCH, EXTENDED RELEASE TRANSDERMAL
Qty: 10 PATCH | Refills: 3 | Status: SHIPPED | OUTPATIENT
Start: 2024-09-27

## 2024-09-27 RX ORDER — DIVALPROEX SODIUM 125 MG/1
125 TABLET, DELAYED RELEASE ORAL 2 TIMES DAILY
Qty: 60 TABLET | Refills: 3 | Status: SHIPPED | OUTPATIENT
Start: 2024-09-27

## 2024-09-27 RX ORDER — RIZATRIPTAN BENZOATE 10 MG/1
10 TABLET ORAL PRN
Qty: 9 TABLET | Refills: 3 | Status: SHIPPED | OUTPATIENT
Start: 2024-09-27

## 2024-10-02 RX ORDER — ESCITALOPRAM OXALATE 5 MG/1
TABLET ORAL
Qty: 90 TABLET | Refills: 1 | Status: SHIPPED | OUTPATIENT
Start: 2024-10-02

## 2024-10-02 NOTE — TELEPHONE ENCOUNTER
Last Appointment:  1/23/2024  Future Appointments   Date Time Provider Department Center   1/7/2025 10:00 AM Jefe Barr [facility] DEP   1/8/2025 11:10 AM Odalys Pham MD [facility]note 3[facility] NEURO Neurology -   2/17/2025  3:00 PM Julian Hicks MD [facility] JULIAN GARCIAL

## 2024-10-07 DIAGNOSIS — F33.1 MODERATE EPISODE OF RECURRENT MAJOR DEPRESSIVE DISORDER (HCC): ICD-10-CM

## 2024-10-07 RX ORDER — ESCITALOPRAM OXALATE 10 MG/1
10 TABLET ORAL DAILY
Qty: 90 TABLET | Refills: 1 | Status: SHIPPED | OUTPATIENT
Start: 2024-10-07 | End: 2025-04-05

## 2024-10-08 ENCOUNTER — PHARMACY VISIT (OUTPATIENT)
Dept: INTERNAL MEDICINE | Age: 49
End: 2024-10-08

## 2024-10-08 ENCOUNTER — TELEPHONE (OUTPATIENT)
Dept: INTERNAL MEDICINE | Age: 49
End: 2024-10-08

## 2024-10-08 DIAGNOSIS — G43.809 MIGRAINE VARIANT WITH HEADACHE: Primary | ICD-10-CM

## 2024-10-08 NOTE — TELEPHONE ENCOUNTER
Specialty Medication Service    Date: 10/8/2024  Patient's Name: Violeta Stevens YOB: 1975            _____________________________________________________________________________________________    Patient returned call to schedule PharmD follow up appointment for Specialty Medication Services. Patient scheduled 10/08/24 at 3 pm.  Most recent office notes from Ankit in patient chart.    Holly Soriano CPhT  Pharmacy   Specialty Medication Services   Phone: [phone] option 4    For Pharmacy Admin Tracking Only    Program: OSIRIS  CPA in place:  Yes  Recommendation Provided To: Patient/Caregiver: 1 via Telephone  Intervention Detail: Scheduled Appointment  Intervention Accepted By: Patient/Caregiver: 1  Gap Closed?:    Time Spent (min): 15

## 2024-10-08 NOTE — PROGRESS NOTES
6/13/2025 1 each 0     No current facility-administered medications for this visit.       Current Specialty Medication: Galcanezumab (Emgality)  Indication Specific Recommended Dose:   Migraine prophylaxis: SubQ: Initial: 240 mg as a single loading dose, followed by 120 mg once monthly.  Contraindications: hypersensitivity   Warnings/Precautions: cardiovascular disease (recent history (within 6 months) of stroke, MI, unstable angina, PCI, CABG, DVT or PE were excluded from clinical trials) and peripheral vascular disease (history of stroke, intracranial or carotid aneurysm, intracranial hemorrhage, vasospastic angina or Raynaud disease excluded from trials)  Recommended Monitoring:   No lab monitoring recommended at this time.   Storage: Store refrigerated at 36°F to 46°F (2°C to 8°C) in the original carton to protect from light. Do NOT freeze. Do NOT expose to heat. Do NOT shake.  Handling: May be stored out of refrigerator in the original carton at room temperature for up to 7 days. After storing out of the refrigerator, do NOT place back in the refrigerator. Do not store above 86°F. Do not use beyond printed expiration date on label.   Patient Reported Side Effects:   Injection site reaction: None  Specialty Medication Start Date: None  Appropriate Dose: Yes  Pregnant: Yes    Describe your medication adherence over the last 4 weeks: Excellent  How many doses have you missed in the last 4 weeks, if any? 0  How confident are you to follow the injection process and the treatment plan? (0-10) 10 Who injects? Self  Does the patient have a current infection of any kind? No  Last refill of abortive medication: Unknown  Number of refills on abortive medication in last 3 months: 0  Contraindications to therapy present? No    Drug Interactions:  No clinically significant interactions identified via iTagged Interaction Analysis as category D or

## 2024-11-05 ENCOUNTER — TELEMEDICINE (OUTPATIENT)
Dept: FAMILY MEDICINE CLINIC | Age: 49
End: 2024-11-05

## 2024-11-05 DIAGNOSIS — F33.1 MODERATE EPISODE OF RECURRENT MAJOR DEPRESSIVE DISORDER (HCC): Primary | Chronic | ICD-10-CM

## 2024-11-05 DIAGNOSIS — G43.809 MIGRAINE VARIANT WITH HEADACHE: Chronic | ICD-10-CM

## 2024-11-05 RX ORDER — ESCITALOPRAM OXALATE 5 MG/1
TABLET ORAL
Qty: 90 TABLET | Refills: 0 | Status: SHIPPED | OUTPATIENT
Start: 2024-11-05

## 2024-11-05 SDOH — ECONOMIC STABILITY: FOOD INSECURITY: WITHIN THE PAST 12 MONTHS, THE FOOD YOU BOUGHT JUST DIDN'T LAST AND YOU DIDN'T HAVE MONEY TO GET MORE.: NEVER TRUE

## 2024-11-05 SDOH — ECONOMIC STABILITY: INCOME INSECURITY: HOW HARD IS IT FOR YOU TO PAY FOR THE VERY BASICS LIKE FOOD, HOUSING, MEDICAL CARE, AND HEATING?: NOT HARD AT ALL

## 2024-11-05 SDOH — ECONOMIC STABILITY: FOOD INSECURITY: WITHIN THE PAST 12 MONTHS, YOU WORRIED THAT YOUR FOOD WOULD RUN OUT BEFORE YOU GOT MONEY TO BUY MORE.: NEVER TRUE

## 2024-11-05 ASSESSMENT — PATIENT HEALTH QUESTIONNAIRE - PHQ9
SUM OF ALL RESPONSES TO PHQ QUESTIONS 1-9: 16
8. MOVING OR SPEAKING SO SLOWLY THAT OTHER PEOPLE COULD HAVE NOTICED. OR THE OPPOSITE, BEING SO FIGETY OR RESTLESS THAT YOU HAVE BEEN MOVING AROUND A LOT MORE THAN USUAL: NOT AT ALL
SUM OF ALL RESPONSES TO PHQ9 QUESTIONS 1 & 2: 3
1. LITTLE INTEREST OR PLEASURE IN DOING THINGS: MORE THAN HALF THE DAYS
2. FEELING DOWN, DEPRESSED OR HOPELESS: SEVERAL DAYS
4. FEELING TIRED OR HAVING LITTLE ENERGY: NEARLY EVERY DAY
3. TROUBLE FALLING OR STAYING ASLEEP: NEARLY EVERY DAY
9. THOUGHTS THAT YOU WOULD BE BETTER OFF DEAD, OR OF HURTING YOURSELF: NOT AT ALL
SUM OF ALL RESPONSES TO PHQ QUESTIONS 1-9: 16
SUM OF ALL RESPONSES TO PHQ QUESTIONS 1-9: 16
7. TROUBLE CONCENTRATING ON THINGS, SUCH AS READING THE NEWSPAPER OR WATCHING TELEVISION: MORE THAN HALF THE DAYS
10. IF YOU CHECKED OFF ANY PROBLEMS, HOW DIFFICULT HAVE THESE PROBLEMS MADE IT FOR YOU TO DO YOUR WORK, TAKE CARE OF THINGS AT HOME, OR GET ALONG WITH OTHER PEOPLE: VERY DIFFICULT
6. FEELING BAD ABOUT YOURSELF - OR THAT YOU ARE A FAILURE OR HAVE LET YOURSELF OR YOUR FAMILY DOWN: MORE THAN HALF THE DAYS
SUM OF ALL RESPONSES TO PHQ QUESTIONS 1-9: 16
5. POOR APPETITE OR OVEREATING: NEARLY EVERY DAY

## 2025-01-03 NOTE — PROGRESS NOTES
Specialty Medication Service    Patient's Name: Violeta Stevens YOB: 1975      Reason for visit: Violeta Stevens is a 49 y.o. female presenting today for Specialty Medication Service visit follow up. Patient last seen by [facility] 10/8/2024. Patient continues on [facility] formulary medication, Emgality. Pharmacy completed Specialty Medication Service visit for medication monitoring and counseling. Medication list updated.    Specialty Medication: Emgality 120mg/mL Autoinjector  Frequency: Once monthly  Indication: Migraines  Initially Diagnosed: 1/2022  Additional Therapy:   Vitamin B2  Magnesium  COQ-10  Divalproex  Rizatriptan  Previous Therapy:   Cymbalta  Neurontin  Tylenol  Excedrin  Meclizine  Dramamine  Vestibular rehab  Aimovig (constipation)     Specialist:   Odalys Pham  [address] [place] [address]  [phone]  Specialist Progress Note Available: Yes, EPIC  Last Specialist Visit: 9/27/2024  Off tylenol/Excedrin. Headache frequency- 26 days of 30 days a month. Maxalt make her too tired, only helped for 2 hours. So stopped. Stop Aimovig due to causing severe constipation. Start Emgality.    Allergies   Allergen Reactions    Compazine [Prochlorperazine Maleate] Other (See Comments)     Patient states \"I go catatonic\"    Prochlorperazine      Other reaction(s): catatonic    Prochlorperazine Edisylate      Other reaction(s): catatonic    Prochlorperazine Maleate      Other reaction(s): catatonic    Codeine Nausea And Vomiting       Past Medical History:   Diagnosis Date    Cephalgia 05/18/2022    Chronic back pain 1999    Depression     Endometriosis     Hyperacusis of left ear 05/18/2022    Reported by pt.    Low blood pressure 05/18/2022    History of low BP    Migraine     Migraine variant with headache 05/18/2022    Childhood onset    Peripheral vestibulopathy 05/18/2022    Persistent postural-perceptual dizziness 05/18/2022    PONV (postoperative nausea and

## 2025-01-07 ENCOUNTER — PHARMACY VISIT (OUTPATIENT)
Dept: INTERNAL MEDICINE | Age: 50
End: 2025-01-07

## 2025-01-07 DIAGNOSIS — G43.809 MIGRAINE VARIANT WITH HEADACHE: ICD-10-CM

## 2025-01-07 ASSESSMENT — PROMIS GLOBAL HEALTH SCALE
IN GENERAL, HOW WOULD YOU RATE YOUR SATISFACTION WITH YOUR SOCIAL ACTIVITIES AND RELATIONSHIPS [ON A SCALE OF 1 (POOR) TO 5 (EXCELLENT)]?: FAIR
IN GENERAL, WOULD YOU SAY YOUR HEALTH IS...[ON A SCALE OF 1 (POOR) TO 5 (EXCELLENT)]: FAIR
IN GENERAL, WOULD YOU SAY YOUR QUALITY OF LIFE IS...[ON A SCALE OF 1 (POOR) TO 5 (EXCELLENT)]: FAIR
IN GENERAL, HOW WOULD YOU RATE YOUR MENTAL HEALTH, INCLUDING YOUR MOOD AND YOUR ABILITY TO THINK [ON A SCALE OF 1 (POOR) TO 5 (EXCELLENT)]?: POOR
SUM OF RESPONSES TO QUESTIONS 2, 4, 5, & 10: 8
SUM OF RESPONSES TO QUESTIONS 3, 6, 7, & 8: 10
TO WHAT EXTENT ARE YOU ABLE TO CARRY OUT YOUR EVERYDAY PHYSICAL ACTIVITIES SUCH AS WALKING, CLIMBING STAIRS, CARRYING GROCERIES, OR MOVING A CHAIR [ON A SCALE OF 1 (NOT AT ALL) TO 5 (COMPLETELY)]?: MODERATELY
IN THE PAST 7 DAYS, HOW WOULD YOU RATE YOUR PAIN ON AVERAGE [ON A SCALE FROM 0 (NO PAIN) TO 10 (WORST IMAGINABLE PAIN)]?: 2
IN THE PAST 7 DAYS, HOW OFTEN HAVE YOU BEEN BOTHERED BY EMOTIONAL PROBLEMS, SUCH AS FEELING ANXIOUS, DEPRESSED, OR IRRITABLE [ON A SCALE FROM 1 (NEVER) TO 5 (ALWAYS)]?: SOMETIMES
IN THE PAST 7 DAYS, HOW WOULD YOU RATE YOUR FATIGUE ON AVERAGE [ON A SCALE FROM 1 (NONE) TO 5 (VERY SEVERE)]?: MODERATE
IN GENERAL, PLEASE RATE HOW WELL YOU CARRY OUT YOUR USUAL SOCIAL ACTIVITIES (INCLUDES ACTIVITIES AT HOME, AT WORK, AND IN YOUR COMMUNITY, AND RESPONSIBILITIES AS A PARENT, CHILD, SPOUSE, EMPLOYEE, FRIEND, ETC) [ON A SCALE OF 1 (POOR) TO 5 (EXCELLENT)]?: FAIR
IN GENERAL, HOW WOULD YOU RATE YOUR PHYSICAL HEALTH [ON A SCALE OF 1 (POOR) TO 5 (EXCELLENT)]?: FAIR

## 2025-01-08 ENCOUNTER — TELEMEDICINE (OUTPATIENT)
Age: 50
End: 2025-01-08
Payer: COMMERCIAL

## 2025-01-08 DIAGNOSIS — R42 CHRONIC VERTIGO: Primary | ICD-10-CM

## 2025-01-08 DIAGNOSIS — G43.711 INTRACTABLE CHRONIC MIGRAINE WITHOUT AURA AND WITH STATUS MIGRAINOSUS: ICD-10-CM

## 2025-01-08 PROCEDURE — 99214 OFFICE O/P EST MOD 30 MIN: CPT | Performed by: PSYCHIATRY & NEUROLOGY

## 2025-01-08 NOTE — PATIENT INSTRUCTIONS
Update me in 2 months      1st month- weaning off depakote  Residual emgality effect      2nd month- without depakote and emgality

## 2025-01-08 NOTE — PROGRESS NOTES
Hernia, Negative for Ulcer, Negative for Hemorrhoids, Negative for Nausea/Vomiting, Negative for Constipation  G/U:  NegativeNegative for Pain/Burning, Negative for Urinary Frequency/Urgency, Negative for Blood in Urine, Negative for Slow/Small Stream, Negative for poor Bladder Emptying, Negative for up at night to urinate, Negativesexual Dysfunction  Endo:  Negative for Cold or Heat Intolerance, Negative for Hot Flashes/Flushing, Negative for Abnormal Thirst, Negative for Change in Body Hair  Skin:   Negative for Lumps or Nodules, Negative for Breast Lumps, Negative for Rashes or Sores   Psych:  Negative for Anxiety/Depression       PHYSICAL EXAM  Constitutional: She  is oriented to person, place and time and well- developed, well nourished and in no distress.  HENT:    Head: Normocephalic and atraumatic.    Nose: Nose normal.   Mouth/Throat: no oropharyngeal exudate.    Eyes: eom full  Neck:  Normal range of motion.  Musculoskeletal: Normal range of motion.   Neurological:   Mental Status: Level of alertness: Normal: Awake, appropriate, attentive  Orientation: Normal: Oriented to person, place and time  Affect: Normal  Speech and Language: Normal: Speech fluent with intact naming, repetition, comprehension, reading and writing.  Cranial Nerve: II - XII normal. Wears sunglasses   Muscle strength was antigravity in jose ue's and le's   There was no dysmetria seen on the jose found on finger-nose-finger testing and heel to shin testing.   The gait examination ataxia, weaving from side to side  Closes eyes to attempt walking   Psychiatric: Memory, affect and judgment normal.        ASSESSMENT AND PLAN   1. Chronic vertigo  She continues to have chronic vertigo and ataxia  She has been evaluated with ENT and has been ruled out of vestibular dysfunction  She has not had any demyelinating CNS plaques  She has failed meclizine, Dramamine and vestibular rehab  Failed scopolamine patches as well       2. Intractable chronic

## 2025-01-09 ENCOUNTER — OFFICE VISIT (OUTPATIENT)
Dept: FAMILY MEDICINE CLINIC | Age: 50
End: 2025-01-09
Payer: COMMERCIAL

## 2025-01-09 VITALS
SYSTOLIC BLOOD PRESSURE: 98 MMHG | HEIGHT: 65 IN | HEART RATE: 65 BPM | OXYGEN SATURATION: 99 % | TEMPERATURE: 97.5 F | RESPIRATION RATE: 18 BRPM | BODY MASS INDEX: 24.69 KG/M2 | WEIGHT: 148.2 LBS | DIASTOLIC BLOOD PRESSURE: 63 MMHG

## 2025-01-09 DIAGNOSIS — R73.01 IMPAIRED FASTING GLUCOSE: ICD-10-CM

## 2025-01-09 DIAGNOSIS — R68.89 COLD INTOLERANCE: ICD-10-CM

## 2025-01-09 DIAGNOSIS — H81.8X9 PERSISTENT POSTURAL-PERCEPTUAL DIZZINESS: Chronic | ICD-10-CM

## 2025-01-09 DIAGNOSIS — M79.672 CHRONIC FOOT PAIN, LEFT: Primary | Chronic | ICD-10-CM

## 2025-01-09 DIAGNOSIS — G89.29 CHRONIC FOOT PAIN, LEFT: Primary | Chronic | ICD-10-CM

## 2025-01-09 DIAGNOSIS — I95.0 IDIOPATHIC HYPOTENSION: Chronic | ICD-10-CM

## 2025-01-09 DIAGNOSIS — F33.1 MODERATE EPISODE OF RECURRENT MAJOR DEPRESSIVE DISORDER (HCC): Chronic | ICD-10-CM

## 2025-01-09 DIAGNOSIS — G43.809 MIGRAINE VARIANT WITH HEADACHE: Chronic | ICD-10-CM

## 2025-01-09 PROCEDURE — 99214 OFFICE O/P EST MOD 30 MIN: CPT | Performed by: STUDENT IN AN ORGANIZED HEALTH CARE EDUCATION/TRAINING PROGRAM

## 2025-01-09 SDOH — ECONOMIC STABILITY: FOOD INSECURITY: WITHIN THE PAST 12 MONTHS, YOU WORRIED THAT YOUR FOOD WOULD RUN OUT BEFORE YOU GOT MONEY TO BUY MORE.: NEVER TRUE

## 2025-01-09 SDOH — ECONOMIC STABILITY: FOOD INSECURITY: WITHIN THE PAST 12 MONTHS, THE FOOD YOU BOUGHT JUST DIDN'T LAST AND YOU DIDN'T HAVE MONEY TO GET MORE.: NEVER TRUE

## 2025-01-09 ASSESSMENT — PATIENT HEALTH QUESTIONNAIRE - PHQ9
2. FEELING DOWN, DEPRESSED OR HOPELESS: SEVERAL DAYS
SUM OF ALL RESPONSES TO PHQ QUESTIONS 1-9: 7
9. THOUGHTS THAT YOU WOULD BE BETTER OFF DEAD, OR OF HURTING YOURSELF: NOT AT ALL
3. TROUBLE FALLING OR STAYING ASLEEP: SEVERAL DAYS
SUM OF ALL RESPONSES TO PHQ9 QUESTIONS 1 & 2: 2
8. MOVING OR SPEAKING SO SLOWLY THAT OTHER PEOPLE COULD HAVE NOTICED. OR THE OPPOSITE, BEING SO FIGETY OR RESTLESS THAT YOU HAVE BEEN MOVING AROUND A LOT MORE THAN USUAL: NOT AT ALL
SUM OF ALL RESPONSES TO PHQ QUESTIONS 1-9: 7
5. POOR APPETITE OR OVEREATING: SEVERAL DAYS
1. LITTLE INTEREST OR PLEASURE IN DOING THINGS: SEVERAL DAYS
6. FEELING BAD ABOUT YOURSELF - OR THAT YOU ARE A FAILURE OR HAVE LET YOURSELF OR YOUR FAMILY DOWN: SEVERAL DAYS
SUM OF ALL RESPONSES TO PHQ QUESTIONS 1-9: 7
7. TROUBLE CONCENTRATING ON THINGS, SUCH AS READING THE NEWSPAPER OR WATCHING TELEVISION: SEVERAL DAYS
10. IF YOU CHECKED OFF ANY PROBLEMS, HOW DIFFICULT HAVE THESE PROBLEMS MADE IT FOR YOU TO DO YOUR WORK, TAKE CARE OF THINGS AT HOME, OR GET ALONG WITH OTHER PEOPLE: VERY DIFFICULT
4. FEELING TIRED OR HAVING LITTLE ENERGY: SEVERAL DAYS
SUM OF ALL RESPONSES TO PHQ QUESTIONS 1-9: 7

## 2025-01-09 NOTE — PROGRESS NOTES
Patient:  Violeta Oh RomCash 49 y.o. female     01/09/25      Chiefcomplaint:   Chief Complaint   Patient presents with    Dizziness    Foot Pain     Left foot pain-- from walking funny     Problems and Overview     Cold intolerance  Headache  Dizziness  Lacking control over legs while walking     Developed pain in lateral mid/forefoot that is persistent and worsened by pressure. No speific injury but walking abnormally on the foto for a while due to persistent dizziness issues.     Dizziness with postural instability and difficulty walking  CT scan and MRI wo contrast and mri neck w contrast done without definite evidence of central cause  Neuro:felt juli hallpike positive on left. Since then appears neg.  ENT - vng neg  Neuro outpatient: migraine possible +/- bppv. MRI findings migraine vs microvascular ischemia vs idiopathic  Swooshing in ears b/l - MRA neg  Echo unremarkable  EP evaluated - recommend compression   Completed vesitbular rehab - not helpful  Vestibular eval done at [facility] - no peripheral involvement - neuro fu. Consider migraine. Tried gabapentin but side effects.  Also tried cymbalta to replace lexapro. Had issues with sleep.      Consideration of functional neurologic disorder but insurance issues made it difficult to establish with them  Wondering about spinal disease causing instability  Wondering whether psychology alone would help dx fnd     Audiology [facility]  1) There were no indications of peripheral vestibular system pathway involvement noted. Normal investigation of superior and inferior vestibular nerve function via examination of the horizontal semicircular canals and otolith organs. There were no clinically significant signs of pathophysiologic nystagmus provoked during gaze stability testing with fixation removed, post-head shake testing, mastoid vibration testing, and positional testing. Low likelihood of active peripheral vestibular system involvement.  2) There were no subjective or

## 2025-01-14 ENCOUNTER — HOSPITAL ENCOUNTER (OUTPATIENT)
Age: 50
Discharge: HOME | End: 2025-01-14
Payer: COMMERCIAL

## 2025-01-14 ENCOUNTER — HOSPITAL ENCOUNTER (OUTPATIENT)
Age: 50
Discharge: HOME | End: 2025-01-16
Payer: COMMERCIAL

## 2025-01-14 ENCOUNTER — HOSPITAL ENCOUNTER (OUTPATIENT)
Dept: GENERAL RADIOLOGY | Age: 50
Discharge: HOME | End: 2025-01-16
Payer: COMMERCIAL

## 2025-01-14 DIAGNOSIS — R73.01 IMPAIRED FASTING GLUCOSE: ICD-10-CM

## 2025-01-14 DIAGNOSIS — M79.672 CHRONIC FOOT PAIN, LEFT: Chronic | ICD-10-CM

## 2025-01-14 DIAGNOSIS — H81.8X9 PERSISTENT POSTURAL-PERCEPTUAL DIZZINESS: Chronic | ICD-10-CM

## 2025-01-14 DIAGNOSIS — G89.29 CHRONIC FOOT PAIN, LEFT: Chronic | ICD-10-CM

## 2025-01-14 DIAGNOSIS — G43.809 MIGRAINE VARIANT WITH HEADACHE: Chronic | ICD-10-CM

## 2025-01-14 LAB
25(OH)D3 SERPL-MCNC: 35 NG/ML (ref 30–100)
ALBUMIN SERPL-MCNC: 4 G/DL (ref 3.5–5.2)
ALP SERPL-CCNC: 61 U/L (ref 35–104)
ALT SERPL-CCNC: 10 U/L (ref 0–32)
ANION GAP SERPL CALCULATED.3IONS-SCNC: 6 MMOL/L (ref 7–16)
AST SERPL-CCNC: 17 U/L (ref 0–31)
BASOPHILS # BLD: 0.04 K/UL (ref 0–0.2)
BASOPHILS NFR BLD: 1 % (ref 0–2)
BILIRUB SERPL-MCNC: 0.5 MG/DL (ref 0–1.2)
BUN SERPL-MCNC: 9 MG/DL (ref 6–20)
CALCIUM SERPL-MCNC: 9.6 MG/DL (ref 8.6–10.2)
CHLORIDE SERPL-SCNC: 105 MMOL/L (ref 98–107)
CO2 SERPL-SCNC: 32 MMOL/L (ref 22–29)
CREAT SERPL-MCNC: 0.9 MG/DL (ref 0.5–1)
EOSINOPHIL # BLD: 0.41 K/UL (ref 0.05–0.5)
EOSINOPHILS RELATIVE PERCENT: 6 % (ref 0–6)
ERYTHROCYTE [DISTWIDTH] IN BLOOD BY AUTOMATED COUNT: 11.7 % (ref 11.5–15)
FERRITIN SERPL-MCNC: 96 NG/ML
FOLATE SERPL-MCNC: 14.5 NG/ML (ref 4.8–24.2)
GFR, ESTIMATED: 77 ML/MIN/1.73M2
GLUCOSE SERPL-MCNC: 91 MG/DL (ref 74–99)
HBA1C MFR BLD: 4.8 % (ref 4–5.6)
HCT VFR BLD AUTO: 38.7 % (ref 34–48)
HGB BLD-MCNC: 13.8 G/DL (ref 11.5–15.5)
IMM GRANULOCYTES # BLD AUTO: <0.03 K/UL (ref 0–0.58)
IMM GRANULOCYTES NFR BLD: 0 % (ref 0–5)
LYMPHOCYTES NFR BLD: 2.04 K/UL (ref 1.5–4)
LYMPHOCYTES RELATIVE PERCENT: 32 % (ref 20–42)
MAGNESIUM SERPL-MCNC: 2.1 MG/DL (ref 1.6–2.6)
MCH RBC QN AUTO: 32.2 PG (ref 26–35)
MCHC RBC AUTO-ENTMCNC: 35.7 G/DL (ref 32–34.5)
MCV RBC AUTO: 90.4 FL (ref 80–99.9)
MONOCYTES NFR BLD: 0.53 K/UL (ref 0.1–0.95)
MONOCYTES NFR BLD: 8 % (ref 2–12)
NEUTROPHILS NFR BLD: 53 % (ref 43–80)
NEUTS SEG NFR BLD: 3.41 K/UL (ref 1.8–7.3)
PLATELET # BLD AUTO: 266 K/UL (ref 130–450)
PMV BLD AUTO: 10 FL (ref 7–12)
POTASSIUM SERPL-SCNC: 4.6 MMOL/L (ref 3.5–5)
PROT SERPL-MCNC: 6 G/DL (ref 6.4–8.3)
RBC # BLD AUTO: 4.28 M/UL (ref 3.5–5.5)
SODIUM SERPL-SCNC: 143 MMOL/L (ref 132–146)
T3 SERPL-MCNC: 85 NG/DL (ref 80–200)
T4 FREE SERPL-MCNC: 1 NG/DL (ref 0.9–1.7)
TSH SERPL DL<=0.05 MIU/L-ACNC: 3.1 UIU/ML (ref 0.27–4.2)
VIT B12 SERPL-MCNC: 572 PG/ML (ref 211–946)
WBC OTHER # BLD: 6.4 K/UL (ref 4.5–11.5)

## 2025-01-14 PROCEDURE — 82746 ASSAY OF FOLIC ACID SERUM: CPT

## 2025-01-14 PROCEDURE — 82306 VITAMIN D 25 HYDROXY: CPT

## 2025-01-14 PROCEDURE — 82728 ASSAY OF FERRITIN: CPT

## 2025-01-14 PROCEDURE — 82607 VITAMIN B-12: CPT

## 2025-01-14 PROCEDURE — 83735 ASSAY OF MAGNESIUM: CPT

## 2025-01-14 PROCEDURE — 82525 ASSAY OF COPPER: CPT

## 2025-01-14 PROCEDURE — 84630 ASSAY OF ZINC: CPT

## 2025-01-14 PROCEDURE — 83036 HEMOGLOBIN GLYCOSYLATED A1C: CPT

## 2025-01-14 PROCEDURE — 84480 ASSAY TRIIODOTHYRONINE (T3): CPT

## 2025-01-14 PROCEDURE — 84207 ASSAY OF VITAMIN B-6: CPT

## 2025-01-14 PROCEDURE — 84439 ASSAY OF FREE THYROXINE: CPT

## 2025-01-14 PROCEDURE — 73630 X-RAY EXAM OF FOOT: CPT

## 2025-01-14 PROCEDURE — 84443 ASSAY THYROID STIM HORMONE: CPT

## 2025-01-14 PROCEDURE — 36415 COLL VENOUS BLD VENIPUNCTURE: CPT

## 2025-01-14 PROCEDURE — 85025 COMPLETE CBC W/AUTO DIFF WBC: CPT

## 2025-01-14 PROCEDURE — 80053 COMPREHEN METABOLIC PANEL: CPT

## 2025-01-14 PROCEDURE — 84425 ASSAY OF VITAMIN B-1: CPT

## 2025-01-15 NOTE — RESULT ENCOUNTER NOTE
Arthritis and heel spur. No fractures. CASE MANAGEMENT INITIAL ASSESSMENT      Reviewed chart and completed assessment with: patients' daughter   Explained Case Management role/services    Health Care Decision Maker :   Primary Decision Maker: Baljit Paul - Child - [phone]    Secondary Decision Maker: Hafsa Tapia - Niece/Nephew - [phone]          Can this person be reached and be able to respond quickly, such as within a few minutes or hours? Yes    Admit date/status: 9/9/21 Inpatient   Diagnosis: elevated troponin   Is this a Readmission?:  Yes      Insurance: Medicare, [address] required for SNF: No       3 night stay required: No    Living arrangements, Adls, care needs, prior to admission: lives in a ranch style house with her son    Transportation: family      Durable Medical Equipment at home:  Walker_X_Cane__RTS__ BSC_X_Shower Chair_X_  02__ HHN__ CPAP__  BiPap__  Hospital Bed_X_ W/C_X__ Other__________    Services in the home and/or outpatient, prior to admission: active with [address] (if applicable)   · Name:  · Address:  · Dialysis Schedule:  · Phone:  · Fax:    PT/OT recs: none    Hospital Exemption Notification (HEN): not initiated     Barriers to discharge: none    Plan/comments: Patient normally lives at home with her son and has family that lives very close to her. She admitted from [facility] where she was getting rehab but daughter states the family does not want her to return. They would like a referral to be made to [facility]. Placed call to Kay and notified of referral.  She states they will review and call back with whether or not they will be able to accept. Patient will not require a precert. Rapid covid on day of discharge.       ECOC on chart for MD signature

## 2025-01-15 NOTE — RESULT ENCOUNTER NOTE
No specific abnormalities. Possibly volume contraction/dehydration. Low protein is of uncertain significance. Recommend increasing protein in diet up to at least 60g daily to help with protein levels and to stabilize weight.

## 2025-01-16 LAB
COPPER SERPL-MCNC: 89.5 UG/DL (ref 80–155)
ZINC SERPL-MCNC: 79.6 UG/DL (ref 60–120)

## 2025-01-18 LAB
PYRIDOXAL PHOS SERPL-SCNC: 118.2 NMOL/L (ref 20–125)
VIT B1 PYROPHOSHATE BLD-SCNC: 177 NMOL/L (ref 70–180)

## 2025-01-30 DIAGNOSIS — F33.1 MODERATE EPISODE OF RECURRENT MAJOR DEPRESSIVE DISORDER (HCC): Chronic | ICD-10-CM

## 2025-01-30 RX ORDER — ESCITALOPRAM OXALATE 5 MG/1
TABLET ORAL
Qty: 90 TABLET | Refills: 1 | Status: SHIPPED | OUTPATIENT
Start: 2025-01-30

## 2025-01-30 NOTE — TELEPHONE ENCOUNTER
Name of Medication(s) Requested:  Requested Prescriptions     Pending Prescriptions Disp Refills    escitalopram (LEXAPRO) 5 MG tablet [Pharmacy Med Name: ESCITALOPRAM OXALATE 5MG TABS] 90 tablet 1     Sig: TAKE ONE TABLET BY MOUTH ONCE A DAY WITH 10MG FOR TOTAL DOSE OF 15MG       Medication is on current medication list Yes    Dosage and directions were verified? Yes    Quantity verified: 90 day supply     Pharmacy Verified?  Yes    Last Appointment:  1/9/2025    Future appts:  Future Appointments   Date Time Provider Department Center   2/13/2025 10:30 AM Rolf Ruiz DO [facility] DEP   2/17/2025  3:00 PM Hernesto Hicks MD [facility]   4/9/2025 11:30 AM Odalys Pham MD [facility] NEURO Neurology -   7/1/2025 10:00 AM Jefe Barr, [facility] DEP        (If no appt send self scheduling link. .REFILLAPPT)  Scheduling request sent?     [] Yes  [x] No    Does patient need updated?  [] Yes  [x] No

## 2025-02-06 ENCOUNTER — OFFICE VISIT (OUTPATIENT)
Dept: FAMILY MEDICINE CLINIC | Age: 50
End: 2025-02-06
Payer: COMMERCIAL

## 2025-02-06 VITALS
WEIGHT: 148 LBS | DIASTOLIC BLOOD PRESSURE: 69 MMHG | HEART RATE: 73 BPM | BODY MASS INDEX: 24.63 KG/M2 | TEMPERATURE: 98.2 F | SYSTOLIC BLOOD PRESSURE: 106 MMHG

## 2025-02-06 DIAGNOSIS — H11.31 SUBCONJUNCTIVAL BLEED, RIGHT: Primary | ICD-10-CM

## 2025-02-06 PROCEDURE — 99212 OFFICE O/P EST SF 10 MIN: CPT | Performed by: STUDENT IN AN ORGANIZED HEALTH CARE EDUCATION/TRAINING PROGRAM

## 2025-02-06 NOTE — PROGRESS NOTES
Patient:  Violeta Rodriguezgo 49 y.o. female     02/06/25      Chiefcomplaint:   Chief Complaint   Patient presents with    Eye Problem     Pt says that she woke up with right eye red in the corner to close to her nose., on Tuesday. Pt says that her eye is not in pain but \"feels dryer\".      History of Present Illness   Right medal eye redness    Not painful  No discharge  Not itchy  No change to vision  No floaters or flashes     Health Maintenance Due   Topic Date Due    HIV screen  Never done    Hepatitis C screen  Never done    Hepatitis B vaccine (1 of 3 - 19+ 3-dose series) Never done    Flu vaccine (1) Never done    COVID-19 Vaccine (3 - 2024-25 season) 09/01/2024      History:  Prior to Visit Medications    Medication Sig Taking? Authorizing Provider   escitalopram (LEXAPRO) 5 MG tablet TAKE ONE TABLET BY MOUTH ONCE A DAY WITH 10MG FOR TOTAL DOSE OF 15MG Yes Rolf Ruiz DO   escitalopram (LEXAPRO) 10 MG tablet Take 1 tablet by mouth daily Alternate total daily dose of 10 and 15mg Yes Rolf Ruiz DO   vitamin B-2 (RIBOFLAVIN) 100 MG TABS tablet Take 1 tablet by mouth daily Yes Zuri Ulrich MD   Magnesium 400 MG TABS Take 400 mg by mouth daily Yes Zuri Ulrich MD   Coenzyme Q10 (COQ10) 100 MG CAPS Take 100 mg by mouth daily Yes Zuri Ulrich MD   Handicap Placard MISC by Does not apply route Patient cannot walk 200 ft without stopping to rest.    Expiration 6/13/2025 Yes Rolf Ruiz DO      Past Medical History:   Diagnosis Date    Cephalgia 05/18/2022    Chronic back pain 1999    Depression     Endometriosis     Hyperacusis of left ear 05/18/2022    Reported by pt.    Low blood pressure 05/18/2022    History of low BP    Migraine     Migraine variant with headache 05/18/2022    Childhood onset    Peripheral vestibulopathy 05/18/2022    Persistent postural-perceptual dizziness 05/18/2022    PONV (postoperative nausea and vomiting)      Physical Exam   Vitals: BP

## 2025-02-13 ENCOUNTER — OFFICE VISIT (OUTPATIENT)
Dept: FAMILY MEDICINE CLINIC | Age: 50
End: 2025-02-13

## 2025-02-13 VITALS
TEMPERATURE: 97.8 F | HEART RATE: 62 BPM | BODY MASS INDEX: 24.32 KG/M2 | RESPIRATION RATE: 18 BRPM | HEIGHT: 65 IN | DIASTOLIC BLOOD PRESSURE: 62 MMHG | OXYGEN SATURATION: 97 % | SYSTOLIC BLOOD PRESSURE: 101 MMHG | WEIGHT: 146 LBS

## 2025-02-13 DIAGNOSIS — I95.0 IDIOPATHIC HYPOTENSION: Chronic | ICD-10-CM

## 2025-02-13 DIAGNOSIS — Z82.0 FAMILY HISTORY OF PARKINSON DISEASE: ICD-10-CM

## 2025-02-13 DIAGNOSIS — H81.8X9 PERSISTENT POSTURAL-PERCEPTUAL DIZZINESS: Primary | Chronic | ICD-10-CM

## 2025-02-13 DIAGNOSIS — R68.89 COLD INTOLERANCE: ICD-10-CM

## 2025-02-13 DIAGNOSIS — G43.809 MIGRAINE VARIANT WITH HEADACHE: Chronic | ICD-10-CM

## 2025-02-13 DIAGNOSIS — L56.8 PHOTOSENSITIVITY: ICD-10-CM

## 2025-02-13 DIAGNOSIS — G25.9 FUNCTIONAL MOVEMENT DISORDER: Chronic | ICD-10-CM

## 2025-02-13 DIAGNOSIS — Z83.2 FAMILY HISTORY OF SARCOIDOSIS: ICD-10-CM

## 2025-02-13 DIAGNOSIS — H83.3X9 SOUND SENSITIVITY, UNSPECIFIED LATERALITY: ICD-10-CM

## 2025-02-13 NOTE — PROGRESS NOTES
Patient:  Violeta Oh RomSaeed 49 y.o. female     02/13/25      Chiefcomplaint:   Chief Complaint   Patient presents with    Follow-up     Test results     Problems and Overview     Cold intolerance  Headache  Dizziness  Lacking control over legs while walking  Light sensitivity  Noise sensitivity  Overall motion sensitivity, some improvement with intention  Some gi upset- burping    Question sarcoid, eeg, autonomic, ms, AI    Dizziness with postural instability and difficulty walking  CT scan and MRI wo contrast and mri neck w contrast done without definite evidence of central cause  Neuro:felt juli hallpike positive on left. Since then appears neg.  ENT - vng neg  Neuro outpatient: migraine possible +/- bppv. MRI findings migraine vs microvascular ischemia vs idiopathic  Swooshing in ears b/l - MRA neg  Echo unremarkable  EP evaluated - recommend compression   Completed vesitbular rehab - not helpful  Vestibular eval done at [facility] - no peripheral involvement - neuro fu. Consider migraine. Tried gabapentin but side effects.  Also tried cymbalta to replace lexapro. Had issues with sleep.      Consideration of functional neurologic disorder but insurance issues made it difficult to establish with them  Wondering about spinal disease causing instability  Wondering whether psychology alone would help dx fnd     Audiology [facility]  1) There were no indications of peripheral vestibular system pathway involvement noted. Normal investigation of superior and inferior vestibular nerve function via examination of the horizontal semicircular canals and otolith organs. There were no clinically significant signs of pathophysiologic nystagmus provoked during gaze stability testing with fixation removed, post-head shake testing, mastoid vibration testing, and positional testing. Low likelihood of active peripheral vestibular system involvement.  2) There were no subjective or objective indications of Benign Paroxysmal Positional

## 2025-02-18 ENCOUNTER — HOSPITAL ENCOUNTER (OUTPATIENT)
Age: 50
Discharge: HOME | End: 2025-02-18
Payer: COMMERCIAL

## 2025-02-18 DIAGNOSIS — R68.89 COLD INTOLERANCE: ICD-10-CM

## 2025-02-18 DIAGNOSIS — G43.809 MIGRAINE VARIANT WITH HEADACHE: Chronic | ICD-10-CM

## 2025-02-18 LAB
CRP SERPL HS-MCNC: <3 MG/L (ref 0–5)
ERYTHROCYTE [SEDIMENTATION RATE] IN BLOOD BY WESTERGREN METHOD: 7 MM/HR (ref 0–20)

## 2025-02-18 PROCEDURE — 36415 COLL VENOUS BLD VENIPUNCTURE: CPT

## 2025-02-18 PROCEDURE — 86039 ANTINUCLEAR ANTIBODIES (ANA): CPT

## 2025-02-18 PROCEDURE — 85652 RBC SED RATE AUTOMATED: CPT

## 2025-02-18 PROCEDURE — 86376 MICROSOMAL ANTIBODY EACH: CPT

## 2025-02-18 PROCEDURE — 86140 C-REACTIVE PROTEIN: CPT

## 2025-02-18 PROCEDURE — 86038 ANTINUCLEAR ANTIBODIES: CPT

## 2025-02-19 LAB
ANA SER QL IA: NEGATIVE
THYROPEROXIDASE AB SERPL IA-ACNC: <4 IU/ML (ref 0–25)

## 2025-03-03 DIAGNOSIS — F33.1 MODERATE EPISODE OF RECURRENT MAJOR DEPRESSIVE DISORDER (HCC): ICD-10-CM

## 2025-03-03 NOTE — TELEPHONE ENCOUNTER
Name of Medication(s) Requested:  Requested Prescriptions     Pending Prescriptions Disp Refills    escitalopram (LEXAPRO) 10 MG tablet [Pharmacy Med Name: ESCITALOPRAM OXALATE 10MG TABS] 90 tablet 1     Sig: TAKE ONE TABLET BY MOUTH ONCE A DAY ALTERNATE TOTAL DAILY DOSE OF 10 AND 15MG       Medication is on current medication list Yes    Dosage and directions were verified? Yes    Quantity verified: 90 day supply     Pharmacy Verified?  Yes    Last Appointment:  2/13/2025    Future appts:  Future Appointments   Date Time Provider Department Center   4/9/2025 11:30 AM Odalys Pham MD [facility] NEURO Neurology -   7/1/2025 10:00 AM Jefe Barr, [facility]   2/18/2026  3:00 PM [facility] Hicks MD [facility]        (If no appt send self scheduling link. .REFILLAPPT)  Scheduling request sent?     [] Yes  [x] No    Does patient need updated?  [] Yes  [x] No

## 2025-03-04 ENCOUNTER — TELEPHONE (OUTPATIENT)
Dept: INTERNAL MEDICINE | Age: 50
End: 2025-03-04

## 2025-03-04 NOTE — TELEPHONE ENCOUNTER
Specialty Medication Service    Date: 3/4/2025  Patient's Name: Violeta Stevens YOB: 1975            _____________________________________________________________________________________________    Patient no longer is taking SMS formulary medication (medication discontinued Emgality). Patient is no longer enrolled in SMS program. Spoke with patient to explain they will no longer be eligible for SMS services and that we will be discharging them from the program. Canceled patient's scheduled pharmD visit on 7/1/25.    Enid Jones PharmD, [facility]  Ambulatory Clinical Pharmacist   Specialty Medication Services   Phone: [phone] option 4  3/4/2025 3:43 PM    For Pharmacy Admin Tracking Only    Program: SMS  Time Spent (min): 15

## 2025-03-05 RX ORDER — ESCITALOPRAM OXALATE 10 MG/1
10 TABLET ORAL DAILY
Qty: 90 TABLET | Refills: 1 | Status: SHIPPED | OUTPATIENT
Start: 2025-03-05

## 2025-04-09 ENCOUNTER — TELEMEDICINE (OUTPATIENT)
Age: 50
End: 2025-04-09
Payer: COMMERCIAL

## 2025-04-09 DIAGNOSIS — G43.019 INTRACTABLE MIGRAINE WITHOUT AURA AND WITHOUT STATUS MIGRAINOSUS: ICD-10-CM

## 2025-04-09 DIAGNOSIS — Z83.2 FAMILY HISTORY OF SARCOIDOSIS: ICD-10-CM

## 2025-04-09 DIAGNOSIS — R42 CHRONIC VERTIGO: Primary | ICD-10-CM

## 2025-04-09 PROCEDURE — 92537 CALORIC VSTBLR TEST W/REC: CPT | Performed by: PSYCHIATRY & NEUROLOGY

## 2025-04-09 PROCEDURE — 99214 OFFICE O/P EST MOD 30 MIN: CPT | Performed by: PSYCHIATRY & NEUROLOGY

## 2025-04-09 NOTE — PROGRESS NOTES
chronic dizziness, especially with a family history of sarcoidosis  I advised the patient that I do not see a reason to put her through an LP  Will recommend repeating her vestibular testing, call after it  Also will put in a rheumatological evaluation after she does blood work for JYOTI antibodies, complement levels, ACE levels and Lyme titer  Advised her to call after vestibular testing  - Alban Castillo DO, Rheumatology, [place]  - CALORIC VESTIBULAR TEST    2. Intractable migraine without aura and without status migrainosus  Migraine headaches have improved in number after she stopped the Depakote and Emgality injections  She is only taking B2, magnesium and co-Q10 over-the-counter as preventatives  Is not interested in any rescue medications    3. Family history of sarcoidosis  Patient is interested in rheumatological opinion to rule out autoimmune causes for her dizziness  So referred to rheumatology  - Alban Castillo DO, [facility]umatology, [facility] was evaluated through a synchronous (real-time) audio-video encounter. The patient (and/or guardian if applicable) is aware that this is a billable service, which includes applicable co-pays. This virtual visit was conducted with patient's (and/or legal guardian's) consent. Patient identification was verified, and a caregiver was present when appropriate.  The patient was located at Home: [address] [id number]  The provider was located at Home (City/State): [place]  Yes, I confirm.   Consults     Total time spent on this encounter: Not billed by time    An electronic signature was used to authenticate this note.

## 2025-04-29 ENCOUNTER — HOSPITAL ENCOUNTER (OUTPATIENT)
Age: 50
Discharge: HOME | End: 2025-04-29
Payer: COMMERCIAL

## 2025-04-29 ENCOUNTER — TELEPHONE (OUTPATIENT)
Age: 50
End: 2025-04-29

## 2025-04-29 DIAGNOSIS — R42 CHRONIC VERTIGO: ICD-10-CM

## 2025-04-29 PROCEDURE — 36415 COLL VENOUS BLD VENIPUNCTURE: CPT

## 2025-04-29 PROCEDURE — 86160 COMPLEMENT ANTIGEN: CPT

## 2025-04-29 PROCEDURE — 82164 ANGIOTENSIN I ENZYME TEST: CPT

## 2025-04-29 PROCEDURE — 86618 LYME DISEASE ANTIBODY: CPT

## 2025-04-29 PROCEDURE — 86235 NUCLEAR ANTIGEN ANTIBODY: CPT

## 2025-04-30 LAB
ENA SM AB SER QL: NEGATIVE
JO-1 ANTIBODY: NEGATIVE
SCLERODERMA (SCL-70) AB: NEGATIVE
SJOGREN'S ANTIBODIES (SSA): NEGATIVE
SJOGREN'S ANTIBODIES (SSB): NEGATIVE
U1 SNRNP IGG SER-ACNC: NEGATIVE

## 2025-05-03 LAB
ACE SERPL-CCNC: 24 U/L (ref 16–85)
B BURGDOR AB SER IA-ACNC: 0.13 IV
C3 SERPL-MCNC: 114 MG/DL (ref 90–180)
C4 SERPL-MCNC: 35 MG/DL (ref 10–40)

## 2025-05-13 ENCOUNTER — OFFICE VISIT (OUTPATIENT)
Dept: FAMILY MEDICINE CLINIC | Age: 50
End: 2025-05-13
Payer: COMMERCIAL

## 2025-05-13 VITALS
OXYGEN SATURATION: 99 % | BODY MASS INDEX: 24.32 KG/M2 | TEMPERATURE: 98.1 F | HEIGHT: 65 IN | RESPIRATION RATE: 18 BRPM | DIASTOLIC BLOOD PRESSURE: 66 MMHG | SYSTOLIC BLOOD PRESSURE: 98 MMHG | HEART RATE: 67 BPM | WEIGHT: 146 LBS

## 2025-05-13 DIAGNOSIS — G43.809 MIGRAINE VARIANT WITH HEADACHE: Chronic | ICD-10-CM

## 2025-05-13 DIAGNOSIS — F33.1 MODERATE EPISODE OF RECURRENT MAJOR DEPRESSIVE DISORDER (HCC): ICD-10-CM

## 2025-05-13 DIAGNOSIS — Z76.0 MEDICATION REFILL: ICD-10-CM

## 2025-05-13 DIAGNOSIS — G25.9 FUNCTIONAL MOVEMENT DISORDER: Primary | Chronic | ICD-10-CM

## 2025-05-13 DIAGNOSIS — H81.8X9 PERSISTENT POSTURAL-PERCEPTUAL DIZZINESS: Chronic | ICD-10-CM

## 2025-05-13 DIAGNOSIS — R26.2 DIFFICULTY WALKING: Chronic | ICD-10-CM

## 2025-05-13 DIAGNOSIS — R29.3 POSTURAL INSTABILITY: Chronic | ICD-10-CM

## 2025-05-13 PROCEDURE — 99214 OFFICE O/P EST MOD 30 MIN: CPT | Performed by: STUDENT IN AN ORGANIZED HEALTH CARE EDUCATION/TRAINING PROGRAM

## 2025-05-13 PROCEDURE — G2211 COMPLEX E/M VISIT ADD ON: HCPCS | Performed by: STUDENT IN AN ORGANIZED HEALTH CARE EDUCATION/TRAINING PROGRAM

## 2025-05-13 RX ORDER — ESCITALOPRAM OXALATE 5 MG/1
TABLET ORAL
Qty: 90 TABLET | Refills: 1 | Status: SHIPPED | OUTPATIENT
Start: 2025-05-13

## 2025-05-13 NOTE — PROGRESS NOTES
treatment for vestibular migraine or migraine variant through neuro with plan for additional vestibular testing.     For depression, continue current dosing of lexapro, this is stable.   Functional movement disorder  -     MRI THORACIC SPINE WO CONTRAST; Future  -     MRI LUMBAR SPINE WO CONTRAST; Future  -     MRI CERVICAL SPINE WO CONTRAST; Future  Postural instability  -     MRI THORACIC SPINE WO CONTRAST; Future  -     MRI LUMBAR SPINE WO CONTRAST; Future  -     MRI CERVICAL SPINE WO CONTRAST; Future  Persistent postural-perceptual dizziness  -     MRI THORACIC SPINE WO CONTRAST; Future  -     MRI LUMBAR SPINE WO CONTRAST; Future  -     MRI CERVICAL SPINE WO CONTRAST; Future  Difficulty walking  -     MRI THORACIC SPINE WO CONTRAST; Future  -     MRI LUMBAR SPINE WO CONTRAST; Future  -     MRI CERVICAL SPINE WO CONTRAST; Future  Migraine variant with headache  Moderate episode of recurrent major depressive disorder (HCC)  Medication refill  -     escitalopram (LEXAPRO) 5 MG tablet; TAKE ONE TABLET BY MOUTH ONCE A DAY WITH 10MG FOR TOTAL DOSE OF 15MG, Disp-90 tablet, R-1Normal    On this date 2/13/2025 No LOS data to display    reviewing previous notes, test results and face to face with the patient discussing the diagnosis and importance of compliance with the treatment plan as well as documenting on the day of the visit.      No follow-ups on file.    Rolf Ruiz, DO     This document may have been prepared at least partially through the use of voice recognition software. Although effort is taken to assure the accuracy of this document, it is possible that grammatical, syntax,  or spelling errors may occur.

## 2025-05-16 ENCOUNTER — RESULTS FOLLOW-UP (OUTPATIENT)
Age: 50
End: 2025-05-16

## 2025-05-22 ENCOUNTER — HOSPITAL ENCOUNTER (OUTPATIENT)
Dept: GENERAL RADIOLOGY | Age: 50
Discharge: HOME | End: 2025-05-24
Payer: COMMERCIAL

## 2025-05-22 VITALS — HEIGHT: 65 IN | BODY MASS INDEX: 24.32 KG/M2 | WEIGHT: 146 LBS

## 2025-05-22 DIAGNOSIS — Z12.31 SCREENING MAMMOGRAM, ENCOUNTER FOR: ICD-10-CM

## 2025-05-22 PROCEDURE — 77067 SCR MAMMO BI INCL CAD: CPT

## 2025-06-19 ENCOUNTER — HOSPITAL ENCOUNTER (OUTPATIENT)
Dept: MRI IMAGING | Age: 50
Discharge: HOME | End: 2025-06-21
Payer: COMMERCIAL

## 2025-06-19 DIAGNOSIS — R26.2 DIFFICULTY WALKING: Chronic | ICD-10-CM

## 2025-06-19 DIAGNOSIS — R29.3 POSTURAL INSTABILITY: Chronic | ICD-10-CM

## 2025-06-19 DIAGNOSIS — H81.8X9 PERSISTENT POSTURAL-PERCEPTUAL DIZZINESS: Chronic | ICD-10-CM

## 2025-06-19 DIAGNOSIS — G25.9 FUNCTIONAL MOVEMENT DISORDER: Chronic | ICD-10-CM

## 2025-06-19 PROCEDURE — 72148 MRI LUMBAR SPINE W/O DYE: CPT

## 2025-06-19 PROCEDURE — 72146 MRI CHEST SPINE W/O DYE: CPT

## 2025-06-19 PROCEDURE — 72141 MRI NECK SPINE W/O DYE: CPT

## 2025-06-20 ENCOUNTER — RESULTS FOLLOW-UP (OUTPATIENT)
Dept: FAMILY MEDICINE CLINIC | Age: 50
End: 2025-06-20

## 2025-07-15 ENCOUNTER — TELEPHONE (OUTPATIENT)
Dept: AUDIOLOGY | Age: 50
End: 2025-07-15

## 2025-07-15 ENCOUNTER — OFFICE VISIT (OUTPATIENT)
Dept: FAMILY MEDICINE CLINIC | Age: 50
End: 2025-07-15
Payer: COMMERCIAL

## 2025-07-15 ENCOUNTER — NURSE TRIAGE (OUTPATIENT)
Dept: OTHER | Facility: CLINIC | Age: 50
End: 2025-07-15

## 2025-07-15 VITALS
OXYGEN SATURATION: 99 % | BODY MASS INDEX: 24.26 KG/M2 | WEIGHT: 145.6 LBS | SYSTOLIC BLOOD PRESSURE: 108 MMHG | DIASTOLIC BLOOD PRESSURE: 73 MMHG | TEMPERATURE: 97.4 F | RESPIRATION RATE: 16 BRPM | HEART RATE: 69 BPM | HEIGHT: 65 IN

## 2025-07-15 DIAGNOSIS — R26.2 DIFFICULTY WALKING: ICD-10-CM

## 2025-07-15 DIAGNOSIS — H81.8X9 PERSISTENT POSTURAL-PERCEPTUAL DIZZINESS: Primary | Chronic | ICD-10-CM

## 2025-07-15 DIAGNOSIS — K21.9 GASTROESOPHAGEAL REFLUX DISEASE, UNSPECIFIED WHETHER ESOPHAGITIS PRESENT: Chronic | ICD-10-CM

## 2025-07-15 PROCEDURE — 99214 OFFICE O/P EST MOD 30 MIN: CPT | Performed by: STUDENT IN AN ORGANIZED HEALTH CARE EDUCATION/TRAINING PROGRAM

## 2025-07-15 PROCEDURE — G2211 COMPLEX E/M VISIT ADD ON: HCPCS | Performed by: STUDENT IN AN ORGANIZED HEALTH CARE EDUCATION/TRAINING PROGRAM

## 2025-07-15 RX ORDER — FAMOTIDINE 20 MG/1
20 TABLET, FILM COATED ORAL NIGHTLY PRN
Qty: 30 TABLET | Refills: 0 | Status: SHIPPED | OUTPATIENT
Start: 2025-07-15

## 2025-07-15 NOTE — TELEPHONE ENCOUNTER
Location of patient: [place]    Subjective: Caller states \"I need to see about finding a Neurologist in the [facility], not located in [place], because no one in [place] wants to see me.\"     Writer provided direction to [employer] for benefit information.      Attention Provider:  Thank you for allowing me to participate in the care of your patient.  The patient was connected to triage in response to symptoms provided.   Please do not respond through this encounter as the response is not directed to a shared pool.    Reason for Disposition   General information question, no triage required and triager able to answer question    Protocols used: Information Only Call - [facility]

## 2025-07-15 NOTE — TELEPHONE ENCOUNTER
Patient called and left a voicemail message.   Returned patient message. Left a voicemail at the patient contact number. She wants to get a new VNG.    Electronically signed by Elida Wilburn on 7/15/2025 at 1:08 PM

## 2025-07-15 NOTE — PROGRESS NOTES
Patient:  Violeta Stevens 49 y.o. female     07/15/25      Chiefcomplaint:   Chief Complaint   Patient presents with    Follow-up     Problems and Overview     Patient Active Problem List    Diagnosis Date Noted    Difficulty walking 07/19/2022     Priority: Medium     Evaluated by ENT, Audiology with VNG (no abnormality noted), neurology ( migraine variant vs bppv), MRI CTA as noted below.   CTA  Impression   1. No acute intracranial abnormality.   2. Unremarkable CTA of the head.     MRI  Multifocal punctate foci of subtle increased T2/FLAIR signal within the   bifrontal cerebral white matter.  Differential diagnostic considerations   include association with minimal microvascular ischemic disease, migraine   headaches, posttraumatic sequela, vasculitis/inflammatory disease.           Family history of Parkinson disease 07/19/2022     Priority: Medium    Functional movement disorder 08/01/2023    Moderate episode of recurrent major depressive disorder (HCC) 06/27/2023    Migraine variant with headache 05/18/2022     Childhood onset      Low BP 03/24/2022     Persistent low normal bp      Persistent postural-perceptual dizziness 02/25/2022    Vitamin D deficiency 09/02/2021    Chronic bilateral low back pain without sciatica 10/21/2011    Endometriosis 05/03/2010    Depression 09/01/1999      Prevention:  Health Maintenance Due   Topic Date Due    HIV screen  Never done    Hepatitis C screen  Never done    Hepatitis B vaccine (1 of 3 - 19+ 3-dose series) Never done    COVID-19 Vaccine (3 - 2024-25 season) 09/01/2024      Meds prior:  Current Outpatient Medications   Medication Instructions    CoQ10 100 mg, DAILY    escitalopram (LEXAPRO) 5 MG tablet TAKE ONE TABLET BY MOUTH ONCE A DAY WITH 10MG FOR TOTAL DOSE OF 15MG    escitalopram (LEXAPRO) 10 mg, Oral, DAILY    famotidine (PEPCID) 20 mg, Oral, NIGHTLY PRN    Handicap Placard MISC Does not apply, Patient cannot walk 200 ft without stopping to rest.

## 2025-07-16 ENCOUNTER — FOLLOWUP TELEPHONE ENCOUNTER (OUTPATIENT)
Dept: AUDIOLOGY | Age: 50
End: 2025-07-16

## 2025-07-16 NOTE — TELEPHONE ENCOUNTER
Patient called and left a voicemail message.   Returned patient message. Left a voicemail at the patient contact number.    Electronically signed by Elida Wilburn on 7/16/2025 at 12:47 PM

## 2025-07-19 DIAGNOSIS — K21.9 GASTROESOPHAGEAL REFLUX DISEASE, UNSPECIFIED WHETHER ESOPHAGITIS PRESENT: Chronic | ICD-10-CM

## 2025-07-21 RX ORDER — FAMOTIDINE 20 MG/1
TABLET, FILM COATED ORAL
Qty: 30 TABLET | Refills: 0 | OUTPATIENT
Start: 2025-07-21

## 2025-07-23 ENCOUNTER — FOLLOWUP TELEPHONE ENCOUNTER (OUTPATIENT)
Dept: AUDIOLOGY | Age: 50
End: 2025-07-23

## 2025-07-23 DIAGNOSIS — H81.8X9 PERSISTENT POSTURAL-PERCEPTUAL DIZZINESS: Primary | ICD-10-CM

## 2025-07-23 NOTE — TELEPHONE ENCOUNTER
Patient called and left a voicemail message.   Returned patient message. Left a voicemail at the patient contact number.    Electronically signed by Elida Wilburn on 7/23/2025 at 10:27 AM

## 2025-07-29 ENCOUNTER — TELEPHONE (OUTPATIENT)
Dept: AUDIOLOGY | Age: 50
End: 2025-07-29

## 2025-07-29 DIAGNOSIS — R42 DIZZINESS: Primary | ICD-10-CM

## 2025-08-01 DIAGNOSIS — H81.8X9 PERSISTENT POSTURAL-PERCEPTUAL DIZZINESS: Primary | ICD-10-CM

## 2025-08-31 DIAGNOSIS — F33.1 MODERATE EPISODE OF RECURRENT MAJOR DEPRESSIVE DISORDER (HCC): ICD-10-CM

## 2025-09-02 RX ORDER — ESCITALOPRAM OXALATE 10 MG/1
10 TABLET ORAL DAILY
Qty: 90 TABLET | Refills: 1 | Status: SHIPPED | OUTPATIENT
Start: 2025-09-02

## (undated) DEVICE — COVER,LIGHT HANDLE,FLX,1/PK: Brand: MEDLINE INDUSTRIES, INC.

## (undated) DEVICE — SOLUTION IV IRRIG WATER 1000ML POUR BRL 2F7114

## (undated) DEVICE — MATERIAL CAST W3INXL125FT 8 15MMHG BLK SUPP SEAMLESS ULT SHR

## (undated) DEVICE — LUBRICANT SURG JELLY ST BACTER TUBE 4.25OZ

## (undated) DEVICE — GOWN,SIRUS,FABRNF,XL,20/CS: Brand: MEDLINE

## (undated) DEVICE — ELECTRODE PT RET AD L9FT HI MOIST COND ADH HYDRGEL CORDED

## (undated) DEVICE — DRAPE SURGICAL HAND PROX AURORA

## (undated) DEVICE — ELECTRODE ELECSURG NDL 2.8 INX7.2 CM COAT INSUL EDGE

## (undated) DEVICE — GOWN ISOLATN REG YEL M WT MULTIPLY SIDETIE LEV 2

## (undated) DEVICE — 1000 S-DRAPE TOWEL DRAPE 10/BX: Brand: STERI-DRAPE™

## (undated) DEVICE — TUBING, SUCTION, 1/4" X 10', STRAIGHT: Brand: MEDLINE

## (undated) DEVICE — GLOVE ORANGE PI 7   MSG9070

## (undated) DEVICE — CURETTE ORTHO

## (undated) DEVICE — INTENDED FOR TISSUE SEPARATION, AND OTHER PROCEDURES THAT REQUIRE A SHARP SURGICAL BLADE TO PUNCTURE OR CUT.: Brand: BARD-PARKER ® STAINLESS STEEL BLADES

## (undated) DEVICE — GAUZE,SPONGE,4"X4",8PLY,STRL,LF,10/TRAY: Brand: MEDLINE

## (undated) DEVICE — TOWEL,OR,DSP,ST,BLUE,STD,6/PK,12PK/CS: Brand: MEDLINE

## (undated) DEVICE — YANKAUER,BULB TIP,W/O VENT,RIGID,STERILE: Brand: MEDLINE

## (undated) DEVICE — PADDING,UNDERCAST,COTTON, 4"X4YD STERILE: Brand: MEDLINE

## (undated) DEVICE — CONTAINER SPEC COLL 960ML POLYPR TRIANG GRAD INTAKE/OUTPUT

## (undated) DEVICE — 6 X 9  1.75MIL 4-WALL LABGUARD: Brand: MINIGRIP COMMERCIAL LLC

## (undated) DEVICE — 4-PORT MANIFOLD: Brand: NEPTUNE 2

## (undated) DEVICE — DRESSING PETRO W3XL8IN OIL EMUL N ADH GZ KNIT IMPREG CELOS

## (undated) DEVICE — BANDAGE,GAUZE,BULKEE II,4.5"X4.1YD,STRL: Brand: MEDLINE

## (undated) DEVICE — GLOVE SURG SZ 8 L11.77IN FNGR THK9.8MIL STRW LTX POLYMER

## (undated) DEVICE — KIT BEDSIDE REVITAL OX 500ML

## (undated) DEVICE — FORCEPS BX L240CM JAW DIA2.8MM L CAP W/ NDL MIC MESH TOOTH

## (undated) DEVICE — SPONGE,LAP,4"X18",XR,ST,5/PK,40PK/CS: Brand: MEDLINE INDUSTRIES, INC.

## (undated) DEVICE — STRIP,CLOSURE,WOUND,MEDI-STRIP,1/4X3: Brand: MEDLINE

## (undated) DEVICE — SPONGE GZ 4IN 4IN 4 PLY N WVN AVANT

## (undated) DEVICE — MARKER,SKIN,WI/RULER AND LABELS: Brand: MEDLINE

## (undated) DEVICE — PACK PROCEDURE SURG GEN CUST

## (undated) DEVICE — BANDAGE,ELASTIC,ESMARK,STERILE,4"X9',LF: Brand: MEDLINE

## (undated) DEVICE — SOLUTION IV IRRIG POUR BRL 0.9% SODIUM CHL 2F7124

## (undated) DEVICE — SPLINT ORTH W5XL30IN PLSTR OF PARIS FAST IMMOB OF FRAC HI

## (undated) DEVICE — TUBING SUCT 12FR MAL ALUM SHFT FN CAP VENT UNIV CONN W/ OBT

## (undated) DEVICE — COVER HNDL LT DISP

## (undated) DEVICE — MASK,FACE,MAXFLUIDPROTECT,SHIELD/ERLPS: Brand: MEDLINE

## (undated) DEVICE — HAND SET

## (undated) DEVICE — ZIMMER® STERILE DISPOSABLE TOURNIQUET CUFF WITH PLC, DUAL PORT, SINGLE BLADDER, 18 IN. (46 CM)

## (undated) DEVICE — CONVERTORS STOCKINETTE: Brand: CONVERTORS

## (undated) DEVICE — KENDALL 450 SERIES MONITORING FOAM ELECTRODE - RECTANGULAR SHAPE ( 3/PK): Brand: KENDALL

## (undated) DEVICE — PADDING,UNDERCAST,COTTON, 3X4YD STERILE: Brand: MEDLINE

## (undated) DEVICE — Device: Brand: DEFENDO VALVE AND CONNECTOR KIT

## (undated) DEVICE — DOUBLE BASIN SET: Brand: MEDLINE INDUSTRIES, INC.

## (undated) DEVICE — CORD,CAUTERY,BIPOLAR,STERILE: Brand: MEDLINE

## (undated) DEVICE — READY WET SKIN SCRUB TRAY-LF: Brand: MEDLINE INDUSTRIES, INC.

## (undated) DEVICE — BLOCK BITE 60FR CAREGUARD

## (undated) DEVICE — BNDG,ELSTC,MATRIX,STRL,4"X5YD,LF,HOOK&LP: Brand: MEDLINE